# Patient Record
Sex: MALE | Race: WHITE | Employment: FULL TIME | URBAN - NONMETROPOLITAN AREA
[De-identification: names, ages, dates, MRNs, and addresses within clinical notes are randomized per-mention and may not be internally consistent; named-entity substitution may affect disease eponyms.]

---

## 2010-08-12 LAB
AVERAGE GLUCOSE: NORMAL
CREATININE, URINE: 144
HBA1C MFR BLD: 6.4 %
MICROALBUMIN/CREAT 24H UR: 0.35 MG/G{CREAT}
MICROALBUMIN/CREAT UR-RTO: 2.4

## 2018-10-03 LAB
CHOLESTEROL, TOTAL: 128 MG/DL
CHOLESTEROL/HDL RATIO: 4.1
HDLC SERPL-MCNC: 31 MG/DL (ref 35–70)
LDL CHOLESTEROL CALCULATED: 75 MG/DL (ref 0–160)
TRIGL SERPL-MCNC: 140 MG/DL
VLDLC SERPL CALC-MCNC: 97 MG/DL

## 2019-05-15 RX ORDER — LISINOPRIL AND HYDROCHLOROTHIAZIDE 12.5; 1 MG/1; MG/1
10-12.5 TABLET ORAL DAILY
Refills: 0 | COMMUNITY
Start: 2019-04-10 | End: 2019-05-15 | Stop reason: SDUPTHER

## 2019-05-15 RX ORDER — LISINOPRIL AND HYDROCHLOROTHIAZIDE 12.5; 1 MG/1; MG/1
10-12.5 TABLET ORAL DAILY
Qty: 30 TABLET | Refills: 1 | Status: SHIPPED | OUTPATIENT
Start: 2019-05-15 | End: 2019-05-20 | Stop reason: SDUPTHER

## 2019-05-20 ENCOUNTER — TELEPHONE (OUTPATIENT)
Dept: FAMILY MEDICINE CLINIC | Age: 49
End: 2019-05-20

## 2019-05-20 ENCOUNTER — OFFICE VISIT (OUTPATIENT)
Dept: FAMILY MEDICINE CLINIC | Age: 49
End: 2019-05-20
Payer: MEDICAID

## 2019-05-20 VITALS
OXYGEN SATURATION: 98 % | TEMPERATURE: 98 F | HEIGHT: 73 IN | DIASTOLIC BLOOD PRESSURE: 76 MMHG | HEART RATE: 69 BPM | WEIGHT: 315 LBS | BODY MASS INDEX: 41.75 KG/M2 | SYSTOLIC BLOOD PRESSURE: 130 MMHG

## 2019-05-20 DIAGNOSIS — E78.5 HYPERLIPIDEMIA, UNSPECIFIED HYPERLIPIDEMIA TYPE: ICD-10-CM

## 2019-05-20 DIAGNOSIS — I10 ESSENTIAL HYPERTENSION: ICD-10-CM

## 2019-05-20 DIAGNOSIS — Z23 NEED FOR PROPHYLACTIC VACCINATION AND INOCULATION AGAINST VARICELLA: ICD-10-CM

## 2019-05-20 DIAGNOSIS — R53.83 FATIGUE, UNSPECIFIED TYPE: ICD-10-CM

## 2019-05-20 DIAGNOSIS — Z23 NEED FOR PROPHYLACTIC VACCINATION AGAINST DIPHTHERIA-TETANUS-PERTUSSIS (DTP): ICD-10-CM

## 2019-05-20 DIAGNOSIS — Z86.010 HX OF COLONIC POLYPS: Primary | ICD-10-CM

## 2019-05-20 DIAGNOSIS — E55.9 VITAMIN D DEFICIENCY: ICD-10-CM

## 2019-05-20 DIAGNOSIS — T78.40XA ALLERGIC STATE, INITIAL ENCOUNTER: ICD-10-CM

## 2019-05-20 DIAGNOSIS — K91.2 POSTSURGICAL NONABSORPTION: Primary | ICD-10-CM

## 2019-05-20 PROCEDURE — 99214 OFFICE O/P EST MOD 30 MIN: CPT | Performed by: INTERNAL MEDICINE

## 2019-05-20 RX ORDER — LISINOPRIL AND HYDROCHLOROTHIAZIDE 12.5; 1 MG/1; MG/1
TABLET ORAL
Qty: 90 TABLET | Refills: 1 | Status: SHIPPED | OUTPATIENT
Start: 2019-05-20 | End: 2019-09-18 | Stop reason: SDUPTHER

## 2019-05-20 RX ORDER — FLUTICASONE PROPIONATE 50 MCG
2 SPRAY, SUSPENSION (ML) NASAL NIGHTLY
Qty: 1 BOTTLE | Refills: 3 | Status: SHIPPED | OUTPATIENT
Start: 2019-05-20 | End: 2019-09-18 | Stop reason: SDUPTHER

## 2019-05-20 ASSESSMENT — ENCOUNTER SYMPTOMS
BLOOD IN STOOL: 0
SINUS PAIN: 0
CONSTIPATION: 0
BACK PAIN: 0
VOMITING: 0
COUGH: 0
SHORTNESS OF BREATH: 0
NAUSEA: 0
RHINORRHEA: 0
DIARRHEA: 0
ABDOMINAL PAIN: 0
WHEEZING: 0

## 2019-05-20 ASSESSMENT — PATIENT HEALTH QUESTIONNAIRE - PHQ9
1. LITTLE INTEREST OR PLEASURE IN DOING THINGS: 0
SUM OF ALL RESPONSES TO PHQ QUESTIONS 1-9: 0
SUM OF ALL RESPONSES TO PHQ QUESTIONS 1-9: 0
2. FEELING DOWN, DEPRESSED OR HOPELESS: 0
SUM OF ALL RESPONSES TO PHQ9 QUESTIONS 1 & 2: 0

## 2019-05-20 NOTE — PROGRESS NOTES
19  Nati David : 1970 Sex: male  Age: 46 y.o. Chief Complaint   Patient presents with    Hypertension     1W   Multiple medical issues  HPI:  Presents today for follow-up visit on problems listed below. Given long since she is status post be her sooner. He's gained 13 pounds since last to 370.'s office to not watching at all. Abdomen very active either. His blood pressures have been good. Was also good here in the office. In the interim he had had another CAT scan chest abdomen and pelvis which I reviewed continuing to show nodules which are unchanged. Abdomen and pelvis looked good. He states he has been using a CPAP machine with success. We did give him prescription last time for a new machine. He is also overdue for colonoscopy. He saw Dash Zacarias and is willing to go back and see him again. Review of Systems   Constitutional: Positive for fatigue. Negative for activity change, appetite change, chills, diaphoresis, fever and unexpected weight change. Morbidly obese   HENT: Negative for congestion, ear pain, hearing loss, postnasal drip, rhinorrhea and sinus pain. Respiratory: Negative for cough, shortness of breath and wheezing. Cardiovascular: Negative for chest pain, palpitations and leg swelling. Gastrointestinal: Negative for abdominal pain, blood in stool, constipation, diarrhea, nausea and vomiting. Endocrine: Negative. Genitourinary: Negative for difficulty urinating, dysuria, frequency, hematuria and urgency. Musculoskeletal: Negative for arthralgias, back pain, gait problem and myalgias. Skin: Negative. Allergic/Immunologic: Negative for environmental allergies and immunocompromised state. Neurological: Negative for dizziness, weakness, light-headedness, numbness and headaches. Hematological: Negative. Psychiatric/Behavioral: Positive for sleep disturbance. The patient is not nervous/anxious.          Uses CPAP with success     REST OF PERTINENT ROS GONE OVER AND WAS NEGATIVE. PMH:  Health Maintenance:  Colonoscopy - (2017)  EGD - 8/10,  2D ECHO - 8/10  EKG -   Psa Test -   Medical Problems:  Obesity, 3 abd hernias repaired, right knee arthroplasty, Rih  Sleep Apnea - with bipap  gastric bypass -   Vitamin B12 deficiency, Iron deficiency  Type 2 Diabetes - Prior to gastric bypass  Gout, Colon Polyps, Fatty Liver, Diverticulosis  FH: Mother's side,\" a lot of cancer\". Father:  Heart Disease  MI - 1st age 40  Colon Cancer  Renal failure - Dialysis- age 66. Mother:  Ovarian Cancer, Breast Cancer. Brother 1:  Heart Disease  MI - age 54. Brother 2:  None. Sister 1:  Fibromyalgia. [place] 8:  Health Maintenance:  Colonoscopy - (2017)  EGD - 8/10,  2D ECHO - 810  EKG -   Psa Test -   Medical Problems:  Obesity, 3 abd hernias repaired, right knee arthroplasty, Rih  Sleep Apnea - with bipap  gastric bypass -   Vitamin B12 deficiency, Iron deficiency  Type 2 Diabetes - Prior to gastric bypass  Gout, Colon Polyps, Fatty Liver, Diverticulosis  FH: Mother's side,\" a lot of cancer\". Father:  Heart Disease  MI - 1st age 40  Colon Cancer  Renal failure - Dialysis- age 66. Mother:  Ovarian Cancer, Breast Cancer. Brother 1:  Heart Disease  MI - age 54. Brother 2:  None. Sister 1:  Fibromyalgia. Maternal Uncle 1 :  brain cancer. Uncle 1 :  brain cancer. SH:  . 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 Alcohol: Current Alcohol  Use - Admits to 12 pack per week.   .       Current Outpatient Medications:     lisinopril-hydrochlorothiazide (PRINZIDE;ZESTORETIC) 10-12.5 MG per tablet, 1 PILL BY MOUTH DAILY, Disp: 90 tablet, Rfl: 1    fluticasone (FLONASE) 50 MCG/ACT nasal spray, 2 sprays by Nasal route nightly, Disp: 1 Bottle, Rfl: 3  No Known Allergies    Past Medical History:   Diagnosis Date    Ankle pain     Chronic back pain     Chronic knee pain     Dizzy spells     Foot pain     Gout     occaisional flareups    Hyperlipidemia     Hypertension     Routine chest x-ray     Unspecified sleep apnea      Past Surgical History:   Procedure Laterality Date    COLONOSCOPY  07/20/2017    KNEE ARTHROSCOPY  1985    Right knee    LEG SURGERY  1975    Right leg    ABIMAEL-EN-Y GASTRIC BYPASS  09/27/2010    Dr. Abhinav Stanley  1994/1995/2003    x3     Family History   Problem Relation Age of Onset    High Blood Pressure Father     High Cholesterol Father     Heart Disease Father     Heart Attack Brother     No Known Problems Mother      Social History     Socioeconomic History    Marital status:      Spouse name: Not on file    Number of children: Not on file    Years of education: Not on file    Highest education level: Not on file   Occupational History    Not on file   Social Needs    Financial resource strain: Not on file    Food insecurity:     Worry: Not on file     Inability: Not on file    Transportation needs:     Medical: Not on file     Non-medical: Not on file   Tobacco Use    Smoking status: Never Smoker    Smokeless tobacco: Current User     Types: Snuff   Substance and Sexual Activity    Alcohol use: Yes     Comment: moderately (1-2 times per month)    Drug use: No    Sexual activity: Yes     Partners: Female   Lifestyle    Physical activity:     Days per week: Not on file     Minutes per session: Not on file    Stress: Not on file   Relationships    Social connections:     Talks on phone: Not on file     Gets together: Not on file     Attends [employer] service: Not on file     Active member of club or organization: Not on file     Attends meetings of clubs or organizations: Not on file     Relationship status: Not on file    Intimate partner violence:     Fear of current or ex partner: Not on file     Emotionally abused: Not on file     Physically abused: Not on file     Forced sexual activity: Not on file   Other Topics Concern    Not on file   Social History Narrative    Not on file       Vitals:    05/20/19 1614   BP: 130/76   Pulse: 69   Temp: 98 °F (36.7 °C)   SpO2: 98%   Weight: (!) 370 lb (167.8 kg)   Height: 6' 1\" (1.854 m)       Physical Exam  Exam:  Const: Appears well developed and well nourished. Appears morbidly obese. No signs of acute distress  present. Neck: Supple and symmetric. Palpation reveals no adenopathy. No masses appreciated. Thyroid  exhibits no nodule or thyromegaly. No JVD. Carotids: 2+ and equal bilaterally, without bruits. Resp: No rales, rhonchi or wheezes appreciated over the lungs bilaterally. CV: Rate is regular. Rhythm is regular. S1 is normal. S2 is normal. No gallop or rubs. No heart  murmur appreciated. Extremities: Edema, but no clubbing or cyanosis/trace  Abdomen: Bowel sounds are normoactive/obese Palpation reveals softness, with no distension,  organomegaly or tenderness. No abdominal masses palpable/upper abdominal midline hernia. No  palpable hepatosplenomegaly. Psych: Patient's attitude is cooperative. Patient's affect is appropriate. Judgement is realistic. Insight  is appropriate. Lymph nodes: none palpable  Assessment and Plan:  Ayleenorrjeanette West was seen today for hypertension. Diagnoses and all orders for this visit:    Postsurgical nonabsorption  -     IRON; Future  -     FERRITIN; Future    Need for prophylactic vaccination against diphtheria-tetanus-pertussis (DTP)    Need for prophylactic vaccination and inoculation against varicella    Vitamin D deficiency    Essential hypertension  -     CBC Auto Differential; Future  -     Comprehensive Metabolic Panel; Future    Hyperlipidemia, unspecified hyperlipidemia type  -     Lipid Panel; Future    Fatigue, unspecified type  -     TSH without Reflex;  Future    Allergic state, initial encounter  -     fluticasone (FLONASE) 50 MCG/ACT nasal spray; 2 sprays by Nasal route nightly    Other orders  -     lisinopril-hydrochlorothiazide (PRINZIDE;ZESTORETIC) 10-12.5 MG per tablet; 1 PILL BY MOUTH DAILY    Plan: We'll see him back in 4 months. Prescription management. Monitor blood pressure closely. Colonoscopy which is overdue. Weight loss attempts. Blood work in the next several days to monitor disease progression and medication use. Stressed compliance with follow-up. He will need another CAT scan of chest 11/19    No follow-ups on file. Seen By:  Cecy Negro MD      *Document was created using voice recognition software. Note was reviewed however may contain grammatical errors.

## 2019-05-24 ENCOUNTER — HOSPITAL ENCOUNTER (OUTPATIENT)
Age: 49
Discharge: HOME | End: 2019-05-26
Payer: MEDICAID

## 2019-05-24 DIAGNOSIS — E78.5 HYPERLIPIDEMIA, UNSPECIFIED HYPERLIPIDEMIA TYPE: ICD-10-CM

## 2019-05-24 DIAGNOSIS — R53.83 FATIGUE, UNSPECIFIED TYPE: ICD-10-CM

## 2019-05-24 DIAGNOSIS — I10 ESSENTIAL HYPERTENSION: ICD-10-CM

## 2019-05-24 DIAGNOSIS — K91.2 POSTSURGICAL NONABSORPTION: ICD-10-CM

## 2019-05-24 LAB
ALBUMIN SERPL-MCNC: 3.9 G/DL (ref 3.5–5.2)
ALP BLD-CCNC: 66 U/L (ref 40–129)
ALT SERPL-CCNC: 14 U/L (ref 0–40)
ANION GAP SERPL CALCULATED.3IONS-SCNC: 11 MMOL/L (ref 7–16)
AST SERPL-CCNC: 15 U/L (ref 0–39)
BASOPHILS ABSOLUTE: 0.06 E9/L (ref 0–0.2)
BASOPHILS RELATIVE PERCENT: 0.8 % (ref 0–2)
BILIRUB SERPL-MCNC: 0.4 MG/DL (ref 0–1.2)
BUN BLDV-MCNC: 16 MG/DL (ref 6–20)
CALCIUM SERPL-MCNC: 9.2 MG/DL (ref 8.6–10.2)
CHLORIDE BLD-SCNC: 104 MMOL/L (ref 98–107)
CHOLESTEROL, TOTAL: 143 MG/DL (ref 0–199)
CO2: 27 MMOL/L (ref 22–29)
CREAT SERPL-MCNC: 1.2 MG/DL (ref 0.7–1.2)
EOSINOPHILS ABSOLUTE: 0.13 E9/L (ref 0.05–0.5)
EOSINOPHILS RELATIVE PERCENT: 1.8 % (ref 0–6)
FERRITIN: 21 NG/ML
GFR AFRICAN AMERICAN: >60
GFR NON-AFRICAN AMERICAN: >60 ML/MIN/1.73
GLUCOSE BLD-MCNC: 109 MG/DL (ref 74–99)
HCT VFR BLD CALC: 50.8 % (ref 37–54)
HDLC SERPL-MCNC: 33 MG/DL
HEMOGLOBIN: 14.9 G/DL (ref 12.5–16.5)
IMMATURE GRANULOCYTES #: 0.02 E9/L
IMMATURE GRANULOCYTES %: 0.3 % (ref 0–5)
IRON: 94 MCG/DL (ref 59–158)
LDL CHOLESTEROL CALCULATED: 79 MG/DL (ref 0–99)
LYMPHOCYTES ABSOLUTE: 1.77 E9/L (ref 1.5–4)
LYMPHOCYTES RELATIVE PERCENT: 24.2 % (ref 20–42)
MCH RBC QN AUTO: 26.1 PG (ref 26–35)
MCHC RBC AUTO-ENTMCNC: 29.3 % (ref 32–34.5)
MCV RBC AUTO: 89 FL (ref 80–99.9)
MONOCYTES ABSOLUTE: 0.67 E9/L (ref 0.1–0.95)
MONOCYTES RELATIVE PERCENT: 9.2 % (ref 2–12)
NEUTROPHILS ABSOLUTE: 4.66 E9/L (ref 1.8–7.3)
NEUTROPHILS RELATIVE PERCENT: 63.7 % (ref 43–80)
PDW BLD-RTO: 14.6 FL (ref 11.5–15)
PLATELET # BLD: 229 E9/L (ref 130–450)
PMV BLD AUTO: 10.3 FL (ref 7–12)
POTASSIUM SERPL-SCNC: 4.1 MMOL/L (ref 3.5–5)
RBC # BLD: 5.71 E12/L (ref 3.8–5.8)
SODIUM BLD-SCNC: 142 MMOL/L (ref 132–146)
TOTAL PROTEIN: 7 G/DL (ref 6.4–8.3)
TRIGL SERPL-MCNC: 156 MG/DL (ref 0–149)
TSH SERPL DL<=0.05 MIU/L-ACNC: 3.53 UIU/ML (ref 0.27–4.2)
VLDLC SERPL CALC-MCNC: 31 MG/DL
WBC # BLD: 7.3 E9/L (ref 4.5–11.5)

## 2019-05-24 PROCEDURE — 84443 ASSAY THYROID STIM HORMONE: CPT

## 2019-05-24 PROCEDURE — 85025 COMPLETE CBC W/AUTO DIFF WBC: CPT

## 2019-05-24 PROCEDURE — 80061 LIPID PANEL: CPT

## 2019-05-24 PROCEDURE — 82728 ASSAY OF FERRITIN: CPT

## 2019-05-24 PROCEDURE — 36415 COLL VENOUS BLD VENIPUNCTURE: CPT

## 2019-05-24 PROCEDURE — 83540 ASSAY OF IRON: CPT

## 2019-05-24 PROCEDURE — 80053 COMPREHEN METABOLIC PANEL: CPT

## 2019-05-24 NOTE — LETTER
Matthew Jones    May 25, 2019     [address] [place] [id number]      Dear Yoon Munoz:    Below are the results from your recent visit:    Resulted Orders   FERRITIN   Result Value Ref Range    Ferritin 21 ng/mL      Comment:      FERRITIN Reference Ranges:  Adult Males   20 - 60 yrars:    30 - 400 ng/mL  Adult females 17 - 60 years:    13 - 150 ng/mL  Adults greater than 60 years:   no established reference range  Pediatrics:                     no established reference range     IRON   Result Value Ref Range    Iron 94 59 - 158 mcg/dL   TSH without Reflex   Result Value Ref Range    TSH 3.530 0.270 - 4.200 uIU/mL   Lipid Panel   Result Value Ref Range    Cholesterol, Total 143 0 - 199 mg/dL    Triglycerides 156 (H) 0 - 149 mg/dL    HDL 33 >40 mg/dL    LDL Calculated 79 0 - 99 mg/dL    VLDL Cholesterol Calculated 31 mg/dL   Comprehensive Metabolic Panel   Result Value Ref Range    Sodium 142 132 - 146 mmol/L    Potassium 4.1 3.5 - 5.0 mmol/L    Chloride 104 98 - 107 mmol/L    CO2 27 22 - 29 mmol/L    Anion Gap 11 7 - 16 mmol/L    Glucose 109 (H) 74 - 99 mg/dL    BUN 16 6 - 20 mg/dL    CREATININE 1.2 0.7 - 1.2 mg/dL    GFR Non-African American >60 >=60 mL/min/1.73      Comment:      Chronic Kidney Disease: less than 60 ml/min/1.73 sq.m. Kidney Failure: less than 15 ml/min/1.73 sq.m. Results valid for patients 18 years and older.       GFR African American >60     Calcium 9.2 8.6 - 10.2 mg/dL    Total Protein 7.0 6.4 - 8.3 g/dL    Alb 3.9 3.5 - 5.2 g/dL    Total Bilirubin 0.4 0.0 - 1.2 mg/dL    Alkaline Phosphatase 66 40 - 129 U/L    ALT 14 0 - 40 U/L    AST 15 0 - 39 U/L   CBC Auto Differential   Result Value Ref Range    WBC 7.3 4.5 - 11.5 E9/L    RBC 5.71 3.80 - 5.80 E12/L    Hemoglobin 14.9 12.5 - 16.5 g/dL    Hematocrit 50.8 37.0 - 54.0 %    MCV 89.0 80.0 - 99.9 fL    MCH 26.1 26.0 - 35.0 pg    MCHC 29.3 (L) 32.0 - 34.5 %    RDW 14.6 11.5 - 15.0 fL    Platelets 574 449 - 739 E9/L MPV 10.3 7.0 - 12.0 fL    Neutrophils % 63.7 43.0 - 80.0 %    Immature Granulocytes % 0.3 0.0 - 5.0 %    Lymphocytes % 24.2 20.0 - 42.0 %    Monocytes % 9.2 2.0 - 12.0 %    Eosinophils % 1.8 0.0 - 6.0 %    Basophils % 0.8 0.0 - 2.0 %    Neutrophils # 4.66 1.80 - 7.30 E9/L    Immature Granulocytes # 0.02 E9/L    Lymphocytes # 1.77 1.50 - 4.00 E9/L    Monocytes # 0.67 0.10 - 0.95 E9/L    Eosinophils # 0.13 0.05 - 0.50 E9/L    Basophils # 0.06 0.00 - 0.20 E9/L     The test results were table. If you have any questions or concerns, please don't hesitate to call. Sincerely,        Wong Fraser.  Shayla Barrett MD

## 2019-06-19 ENCOUNTER — OFFICE VISIT (OUTPATIENT)
Dept: SURGERY | Age: 49
End: 2019-06-19
Payer: MEDICAID

## 2019-06-19 VITALS
SYSTOLIC BLOOD PRESSURE: 130 MMHG | HEART RATE: 52 BPM | WEIGHT: 315 LBS | HEIGHT: 74 IN | TEMPERATURE: 97.8 F | DIASTOLIC BLOOD PRESSURE: 90 MMHG | RESPIRATION RATE: 16 BRPM | OXYGEN SATURATION: 98 % | BODY MASS INDEX: 40.43 KG/M2

## 2019-06-19 DIAGNOSIS — D12.6 TUBULOVILLOUS ADENOMA OF COLON: ICD-10-CM

## 2019-06-19 PROCEDURE — G8427 DOCREV CUR MEDS BY ELIG CLIN: HCPCS | Performed by: SURGERY

## 2019-06-19 PROCEDURE — 99214 OFFICE O/P EST MOD 30 MIN: CPT | Performed by: SURGERY

## 2019-06-19 PROCEDURE — 4004F PT TOBACCO SCREEN RCVD TLK: CPT | Performed by: SURGERY

## 2019-06-19 PROCEDURE — 3017F COLORECTAL CA SCREEN DOC REV: CPT | Performed by: SURGERY

## 2019-06-19 PROCEDURE — G8417 CALC BMI ABV UP PARAM F/U: HCPCS | Performed by: SURGERY

## 2019-06-19 RX ORDER — MAGNESIUM CITRATE
900 SOLUTION, ORAL ORAL ONCE
Qty: 3 BOTTLE | Refills: 0 | Status: SHIPPED | OUTPATIENT
Start: 2019-06-19 | End: 2019-06-19

## 2019-06-19 NOTE — PROGRESS NOTES
[facility]  HISTORY & PHYSICAL      CC:  [facility] Risk Colorectal cancer screening    HPI:     Melchor Mane  -- The patient was sent here to discuss high risk colorectal cancer screening. The patient denies any weight loss, rectal bleeding, abdominal pain, or change in bowel habits. There is no family history of IBD or colorectal cancer.   The patient had a colonoscopy by me in 2017--he had 3 large polyps--tubular adenoma and tubulovillous adenoma    Past Medical History:   Diagnosis Date    Ankle pain     Chronic back pain     Chronic knee pain     Dizzy spells     Foot pain     Gout     occaisional flareups    Hyperlipidemia     Hypertension     Routine chest x-ray     Unspecified sleep apnea      Past Surgical History:   Procedure Laterality Date    COLONOSCOPY  07/20/2017    KNEE ARTHROSCOPY  1985    Right knee    LEG SURGERY  1975    Right leg    ABIMAEL-EN-Y GASTRIC BYPASS  09/27/2010    Dr. Angela Correa  1994/1995/2003    x3     Social History     Socioeconomic History    Marital status:      Spouse name: Not on file    Number of children: Not on file    Years of education: Not on file    Highest education level: Not on file   Occupational History    Not on file   Social Needs    Financial resource strain: Not on file    Food insecurity:     Worry: Not on file     Inability: Not on file    Transportation needs:     Medical: Not on file     Non-medical: Not on file   Tobacco Use    Smoking status: Never Smoker    Smokeless tobacco: Current User     Types: Snuff   Substance and Sexual Activity    Alcohol use: Yes     Comment: moderately (1-2 times per month)    Drug use: No    Sexual activity: Yes     Partners: Female   Lifestyle    Physical activity:     Days per week: Not on file     Minutes per session: Not on file    Stress: Not on file   Relationships    Social connections:     Talks on phone: Not on file     Gets together: Not on file     Attends Jehovah's witness service: Not on file     Active member of club or organization: Not on file     Attends meetings of clubs or organizations: Not on file     Relationship status: Not on file    Intimate partner violence:     Fear of current or ex partner: Not on file     Emotionally abused: Not on file     Physically abused: Not on file     Forced sexual activity: Not on file   Other Topics Concern    Not on file   Social History Narrative    Not on file     No Known Allergies     I have reviewed and confirmed the past medical history, surgical history, social history, allergies in the chart. Medications: I have reviewed the medication list in the chart.     Review of Systems:  Review of Systems - History obtained from the patient  General ROS: negative  Psychological ROS: negative  Ophthalmic ROS: negative for - blurry vision, double vision or loss of vision  ENT ROS: negative for - epistaxis, sore throat, vocal changes or malocclusion  Respiratory ROS: negative for - pleuritic pain, shortness of breath or tachypnea  Cardiovascular ROS: negative for - chest pain or palpitations  Gastrointestinal ROS: negative for - abdominal pain or gas/bloating  Genito-Urinary ROS: negative for - hematuria or pelvic pain  Endocrine ROS: negative   Heme ROS: negative   Musculoskeletal ROS: negative  Neurological ROS: negative for - confusion, dizziness, headaches, numbness/tingling, seizures or weakness    Physical Exam   BP (!) 130/90 (Site: Right Upper Arm, Position: Sitting, Cuff Size: Large Adult)   Pulse 52   Temp 97.8 °F (36.6 °C) (Oral)   Resp 16   Ht 6' 2\" (1.88 m)   Wt (!) 360 lb (163.3 kg)   SpO2 98%   BMI 46.22 kg/m²   Vitals:    06/19/19 1438   BP: (!) 130/90   Pulse: 52   Resp: 16   Temp: 97.8 °F (36.6 °C)   SpO2: 98%       PSYCH: mood and affect normal, alert and oriented x 3  CONSTITUTIONAL: No apparent distress, comfortable  EYES: Sclera white, pupils equal round and reactive to light  ENMT: Hearing normal, trachea midline, ears externally intact  LYMPH: no lympadenopathy in neck. No lympadenopathy in groins  RESP: Breath sounds were clear and equal with no rales, wheezes, or rhonchi. Respiratory effort was normal with no retractions or use of accessory muscles. CV: Heart sounds were normal with a regular rate and rhythm. No pedal edema  GI/ Abdomen: The abdomen was soft and non distended. There was no tenderness, guarding, rebound, or rigidity. There was no                     masses, hepatosplenomegaly. reduible ventral hernia  Rectal -Deferred  MSK: no clubbing/ no cyanosis/ gait normal       Assessment:    1. High risk for colorectal cancer --hx of tubulovillous adenoma, tubular adenoma on last colonoscopy in 7/2017  2. Reducible ventral hernia    Plan:  1. High Risk Screening colonoscopy  I discussed the options for colorectal cancer screening with the patient including options of fecal occult blood testing with flexible sigmoidoscopy or air contrast barium enema. I also discussed the risks and benefits of colonoscopy with possible biopsy/polypectomy/cauterization. I recommended colonoscopy with deep sedation. The patient understands the risks of bleeding and perforation (<0.1%) and agrees to proceed.   2 days of clear liquids prior to procedure  Magcitrate/ dulcolax split dose prep  Schedule at [facility]    Pt prefers in end July/ August    2. Reducible Ventral Hernia--asymptomatic--monitor for now    Gonzalo Hoskins MD, FACS  6/19/2019  3:06 PM

## 2019-06-19 NOTE — PATIENT INSTRUCTIONS
Any questions or concerns, please contact Renetta at [phone]. Stacey/ Hal/ [facility]     MAGNESIUM CITRATE/DULCOLUX TABLETS  COLON PREP FOR COLONOSCOPY OR COLON SURGERY    It is very important that you follow all of the instructions listed on this sheet carefully (they may be slightly different than the directions on the product that you purchase at the pharmacy) to ensure that you colon is adequately cleaned out or you risk of complications could be increased. 2 Days or More Before Endoscopy:   Obtain three 10-ounce bottle of Magnesium Citrate and 1 bottle of Dulcolux tablets from the pharmacy.  Do not eat corn, tomatoes, peas or watermelon 3 to 5 days before procedure.  If you are on INSULIN or OTHER DIABETIC MEDICATIONS, then check with your primary care physician as to how to adjust your medication while on clear liquid diet and when nothing by mouth.  No solid food - only clear liquids (soup, jello, or juice that you can see through with no solid food) for breakfast, lunch and supper. 1 Day Before the Endoscopy:   No solid food - only clear liquids (soup, jello, or juice that you can see through with no solid food) for breakfast, lunch and supper. DO NOT drink or eat anything that is red as it will turn the inside of the colon red and look like blood. Nothing to eat or drink after midnight.  Have at least 8 oz or more of clear liquids for breakfast (7 am to 8 am) and lunch (11:30 am to 12:30 pm).  12 Noon Drink a 10 oz bottle of Magnesium Citrate and 4 Dulcolax tablets followed immediately by at least 8 oz of clear liquids.  1:00 pm Drink at least 8 oz of clear liquids.  2:00 pm Take 4 Dulcolax tablets and drink at least 8 oz of clear liquids.  3:00 pm Drink at least 8 oz of clear liquids.  4:00 pm Drink a 10 oz bottle of Magnesium Citrate followed immediately by at least 8 oz of clear liquids.    5:00 pm Drink at least 8 oz of clear liquids.  Can continue to take liquids until 12 midnight then nothing to eat or drink    Day of Endoscopy:   4 hours prior to scheduled time for colonoscopy, drink a 10 oz bottle of Magnesium Citrate followed immediately by at least 8 oz of clear liquids. Then nothing to drink after that.  STOP ALL Liquids 2 hours prior to colonoscopy.  If any blood pressure medications or heart medications are due in the morning, you should take them with a sip of water. Instructions for Clear liquid diet  Definition  A clear liquid diet consists of clear liquids, such as water, broth and plain gelatin, that are easily digested and leave no undigested residue in your intestinal tract. Your doctor may prescribe a clear liquid diet before certain medical procedures or if you have certain digestive problems. Because a clear liquid diet can't provide you with adequate calories and nutrients, it shouldn't be continued for more than a few days. Purpose  A clear liquid diet is often used before tests, procedures or surgeries that require no food in your stomach or intestines, such as before colonoscopy. It may also be recommended as a short-term diet if you have certain digestive problems, such as nausea, vomiting or diarrhea, or after certain types of surgery. Diet details  A clear liquid diet helps maintain adequate hydration, provides some important electrolytes, such as sodium and potassium, and gives some energy at a time when a full diet isn't possible or recommended. The following foods are allowed in a clear liquid diet:    Plain water    Fruit juices without pulp, such as apple juice, grape juice or cranberry juice    Strained lemonade or fruit punch    Clear, fat-free broth (bouillon or consomme)    Clear sodas    Plain gelatin    Honey    Ice pops without bits of fruit or fruit pulp    Tea or coffee without milk or cream    Any foods not on the above list should be avoided.  Also, for certain tests, such as colon exams, your doctor may ask you to avoid liquids or gelatin with red coloring. A typical menu on the clear liquid diet may look like this:     Breakfast:  1 glass fruit juice  1 cup coffee or tea (without dairy products)  1 cup broth  1 bowl gelatin     Snack:  1 glass fruit juice  1 bowl gelatin     Lunch:  1 glass fruit juice  1 glass water  1 cup broth  1 bowl gelatin     Snack:  1 ice pop (without fruit pulp)  1 cup coffee or tea (without dairy products) or a soft drink     Dinner:  1 cup juice or water  1 cup broth  1 bowl gelatin  1 cup coffee or tea     Results  Although the clear liquid diet may not be very exciting, it does fulfill its purpose. It's designed to keep your stomach and intestines clear, limit strain to your digestive system, but keep your body hydrated as you prepare for or recover from a medical procedure. Risks  Because a clear liquid diet can't provide you with adequate calories and nutrients, it shouldn't be used for more than a few days. Only use the clear liquid diet as directed by your doctor. If your doctor prescribes a clear liquid diet before a medical test, be sure to follow the diet instructions exactly. If you don't follow the diet exactly, you risk an inaccurate test and may have to reschedule the procedure for another time. The importance of proper hydration  A colonoscopy prep causes the body to lose a significant amount of fluid and can result in sickness due to dehydration. It's important that you prepare your body by drinking extra clear liquids before the prep. Stay hydrated by drinking all required clear liquids during the prep. Replenish your system by drinking clear liquids after returning home from your colonoscopy. Colonoscopy     Definition   A colonoscopy is the visual exam of the rectum and colon (large intestine). The exam is done with a tool called a colonoscope.  The colonoscope is a flexible tube with a tiny camera on the end. This instrument allows the doctor to view the inside of your rectum and colon. Colonoscopy        [facility].   Reasons for Procedure   It is used to examine, diagnose, and treat problems in your large intestine. The procedure is most often done for the following reasons:   · To determine the cause of abdominal pain, rectal bleeding, or a change in bowel habits   · To detect and treat colon cancer or colon polyps   · To obtain tissue samples for testing   · To stop intestinal bleeding   · Monitor response to treatment if you have inflammatory bowel disease   Possible Complications   Complications are rare, but no procedure is completely free of risk. If you are planning to have a colonoscopy, your doctor will review a list of possible complications, which may include:   · Bleeding   · Perforation or puncture of the bowel   Factors that may increase the risk of complications include:   · Pre-existing heart or kidney condition   · Treatment with certain medicines, including aspirin and other drugs with anticoagulant or blood-thinning properties   · Prior abdominal surgery or radiation treatments   · Active colitis , diverticulitis , or other acute bowel disease   · Previous treatment with radiation therapy   Be sure to discuss these risks with your doctor before the procedure. What to Expect   Prior to Procedure   Your doctor will likely do the following:   · Physical exam   · Health history   · Review of medicines   · Test your stool for hidden blood (called \"occult blood\")   Your colon must be completely clean before the procedure. Any stool left in the intestine will block the view. This preparation may start several days before the procedure.  Follow your doctor's instructions, which may include any of the following cleansing methods:   · Enemas fluid introduced into the rectum to stimulate a bowel movement   · Laxativesmedicines that cause you to have soft bowel movements   · A clear-liquid diet   · Oral cathartic medicinesa large container of fluid to drink that stimulates a bowel movement   Leading up to your procedure:   · Talk to your doctor about your medicines. You may be asked to stop taking some medicines up to one week before the procedure, like:   ¨ Anti-inflammatory drugs (eg, aspirin )   ¨ Blood thinners like clopidogrel (Plavix) or warfarin (Coumadin)   ¨ Iron supplements or vitamins containing iron   · The night before, eat a light meal. Do not eat or drink anything after midnight. · Wear comfortable clothing. · If you have diabetes, ask your doctor if you need to adjust your insulin dose. · Arrange for a ride home after the procedure. Anesthesia   Your doctor may sedate you to decrease discomfort. Description of the Procedure   You will lie on your left side with knees bent and drawn up toward your chest. The colonoscope will be slowly inserted through the rectum and into the bowel. The colonoscope will inject air into the colon. A small attached video camera will allow the doctor to view the colon's lining on a screen. The doctor will continue guiding the tool through the bowel and assess the lining. A tissue sample or polyps may be removed during the procedure. How Long Will It Take? Less than one hour   Will It Hurt? Most people report some discomfort when the instrument is inserted. You may feel cramping, muscle spasms, or lower abdominal pain during the procedure. You may also feel the urge to move your bowels. Tell the doctor if you feel any severe pain. After the procedure, gas pains and cramping are common. These pains should go away with the passing of gas. Post-procedure Care   If any tissue was removed:   · It will be sent to a lab to be examined. It may take 1-2 weeks for results. The doctor will usually give an initial report after the scope is removed. Other tests may be recommended.    · A small amount of bleeding may occur during the first few days after the procedure. When you return home after the procedure, be sure to follow your doctor's instructions, which may include:   · Resume medicines as instructed by your doctor. · Resume normal diet, unless directed otherwise by your doctor. · The sedative will make you drowsy. Avoid driving, operating machinery, or making important decisions for the rest of the day. · Rest for the remainder of the day. Call Your Doctor   After arriving home, contact your doctor if any of the following occurs:   · Bleeding from your rectumNotify your doctor if you pass a teaspoonful of blood or more. · Black, tarry stools   · Severe abdominal pain   · Hard, swollen abdomen   · Signs of infection, including fever or chills   · Inability to pass gas or stool   · Coughing, shortness of breath, chest pain, severe nausea or vomiting   In case of an emergency, CALL 911 .

## 2019-07-30 NOTE — PROGRESS NOTES
of surgery with 1-2 ounces of water: SEE LIST  [x] Stop herbal supplements and vitamins 5 days before your surgery. [] DO NOT take any diabetic medicine the morning of surgery. Follow instructions for insulin the day before surgery. [] If you are diabetic and your blood sugar is low or you feel symptomatic, you may drink 1-2 ounces of apple juice or take a glucose tablet. The morning of your procedure, you may call the pre-op area if you have concerns about your blood sugar [phone]. [] Use your inhalers the morning of surgery. Bring your emergency inhaler with you day of surgery. [] Follow physician instructions regarding any blood thinners you may be taking. WHAT TO EXPECT:  [x] The day of your procedure you will be greeted and checked in by the [facility] [employer] [facility].  In addition, you will be registered in the [facility] by a Patient Access Representative. Please bring your photo ID and insurance card. A nurse will greet you in accordance to the time you are needed in the pre-op area to prepare you for surgery. Please do not be discouraged if you are not greeted in the order you arrive as there are many variables that are involved in patient preparation. Your patience is greatly appreciated as you wait for your nurse. Please bring in items such as: books, magazines, newspapers, electronics, or any other items  to occupy your time in the waiting area. [x]  Delays may occur. Staff will make a sincere effort to keep you informed of delays. If any delays occur with your procedure, we apologize ahead of time for your inconvenience as we recognize the value of your time.

## 2019-08-05 ENCOUNTER — HOSPITAL ENCOUNTER (OUTPATIENT)
Age: 49
Setting detail: OUTPATIENT SURGERY
Discharge: HOME | End: 2019-08-05
Attending: SURGERY | Admitting: SURGERY
Payer: MEDICAID

## 2019-08-05 ENCOUNTER — ANESTHESIA EVENT (OUTPATIENT)
Dept: ENDOSCOPY | Age: 49
End: 2019-08-05
Payer: MEDICAID

## 2019-08-05 ENCOUNTER — ANESTHESIA (OUTPATIENT)
Dept: ENDOSCOPY | Age: 49
End: 2019-08-05
Payer: MEDICAID

## 2019-08-05 VITALS
OXYGEN SATURATION: 96 % | DIASTOLIC BLOOD PRESSURE: 55 MMHG | RESPIRATION RATE: 21 BRPM | SYSTOLIC BLOOD PRESSURE: 112 MMHG

## 2019-08-05 VITALS
RESPIRATION RATE: 18 BRPM | HEIGHT: 74 IN | BODY MASS INDEX: 40.43 KG/M2 | HEART RATE: 67 BPM | TEMPERATURE: 97.7 F | WEIGHT: 315 LBS | SYSTOLIC BLOOD PRESSURE: 142 MMHG | DIASTOLIC BLOOD PRESSURE: 72 MMHG | OXYGEN SATURATION: 96 %

## 2019-08-05 PROCEDURE — 7100000011 HC PHASE II RECOVERY - ADDTL 15 MIN: Performed by: SURGERY

## 2019-08-05 PROCEDURE — 3700000000 HC ANESTHESIA ATTENDED CARE: Performed by: SURGERY

## 2019-08-05 PROCEDURE — 88305 TISSUE EXAM BY PATHOLOGIST: CPT

## 2019-08-05 PROCEDURE — 3700000001 HC ADD 15 MINUTES (ANESTHESIA): Performed by: SURGERY

## 2019-08-05 PROCEDURE — 6360000002 HC RX W HCPCS: Performed by: NURSE ANESTHETIST, CERTIFIED REGISTERED

## 2019-08-05 PROCEDURE — 2580000003 HC RX 258: Performed by: SURGERY

## 2019-08-05 PROCEDURE — 45385 COLONOSCOPY W/LESION REMOVAL: CPT | Performed by: SURGERY

## 2019-08-05 PROCEDURE — 7100000010 HC PHASE II RECOVERY - FIRST 15 MIN: Performed by: SURGERY

## 2019-08-05 PROCEDURE — 2709999900 HC NON-CHARGEABLE SUPPLY: Performed by: SURGERY

## 2019-08-05 PROCEDURE — 3609010600 HC COLONOSCOPY POLYPECTOMY SNARE/COLD BIOPSY: Performed by: SURGERY

## 2019-08-05 RX ORDER — SODIUM CHLORIDE 0.9 % (FLUSH) 0.9 %
10 SYRINGE (ML) INJECTION EVERY 12 HOURS SCHEDULED
Status: DISCONTINUED | OUTPATIENT
Start: 2019-08-05 | End: 2019-08-05 | Stop reason: HOSPADM

## 2019-08-05 RX ORDER — SODIUM CHLORIDE 9 MG/ML
INJECTION, SOLUTION INTRAVENOUS CONTINUOUS
Status: DISCONTINUED | OUTPATIENT
Start: 2019-08-05 | End: 2019-08-05 | Stop reason: HOSPADM

## 2019-08-05 RX ORDER — PROPOFOL 10 MG/ML
INJECTION, EMULSION INTRAVENOUS PRN
Status: DISCONTINUED | OUTPATIENT
Start: 2019-08-05 | End: 2019-08-05 | Stop reason: SDUPTHER

## 2019-08-05 RX ORDER — 0.9 % SODIUM CHLORIDE 0.9 %
10 VIAL (ML) INJECTION PRN
Status: DISCONTINUED | OUTPATIENT
Start: 2019-08-05 | End: 2019-08-05 | Stop reason: HOSPADM

## 2019-08-05 RX ADMIN — PROPOFOL 430 MG: 10 INJECTION, EMULSION INTRAVENOUS at 12:40

## 2019-08-05 RX ADMIN — SODIUM CHLORIDE: 9 INJECTION, SOLUTION INTRAVENOUS at 12:10

## 2019-08-05 ASSESSMENT — PAIN SCALES - GENERAL
PAINLEVEL_OUTOF10: 0

## 2019-08-05 ASSESSMENT — PAIN - FUNCTIONAL ASSESSMENT
PAIN_FUNCTIONAL_ASSESSMENT: ACTIVITIES ARE NOT PREVENTED
PAIN_FUNCTIONAL_ASSESSMENT: 0-10

## 2019-08-05 ASSESSMENT — PAIN DESCRIPTION - DESCRIPTORS: DESCRIPTORS: ACHING;NAGGING

## 2019-08-05 NOTE — H&P
wheezes, or rhonchi. Respiratory effort was normal with no retractions or use of accessory muscles. CV: Heart sounds were normal with a regular rate and rhythm. No pedal edema  GI/ Abdomen: The abdomen was soft and non distended. There was no tenderness, guarding, rebound, or rigidity. There was no                     masses, hepatosplenomegaly. reduible ventral hernia  Rectal -Deferred  MSK: no clubbing/ no cyanosis/ gait normal         Assessment:    1. High risk for colorectal cancer --hx of tubulovillous adenoma, tubular adenoma on last colonoscopy in 7/2017  2. Reducible ventral hernia     Plan:  1. High Risk Screening colonoscopy  I discussed the options for colorectal cancer screening with the patient including options of fecal occult blood testing with flexible sigmoidoscopy or air contrast barium enema. I also discussed the risks and benefits of colonoscopy with possible biopsy/polypectomy/cauterization. I recommended colonoscopy with deep sedation. The patient understands the risks of bleeding and perforation (<0.1%) and agrees to proceed.   2 days of clear liquids prior to procedure  Magcitrate/ dulcolax split dose prep  Schedule at [address] [place] [address] Dr guerrero in end July/ August     2. Reducible Ventral Hernia--asymptomatic--monitor for now     Rick Pierre MD, FACS

## 2019-08-05 NOTE — ANESTHESIA PRE PROCEDURE
hours.    Coags: No results found for: PROTIME, INR, APTT    HCG (If Applicable): No results found for: PREGTESTUR, PREGSERUM, HCG, HCGQUANT     ABGs: No results found for: PHART, PO2ART, SCM8EJW, EJR8TMO, BEART, P7CVVEFO     Type & Screen (If Applicable):  No results found for: [facility]    Anesthesia Evaluation  Patient summary reviewed and Nursing notes reviewed no history of anesthetic complications:   Airway: Mallampati: III  TM distance: >3 FB   Neck ROM: full  Mouth opening: > = 3 FB Dental:          Pulmonary: breath sounds clear to auscultation  (+) sleep apnea: on CPAP,                             Cardiovascular:    (+) hypertension:,         Rhythm: regular  Rate: normal           Beta Blocker:  Not on Beta Blocker         Neuro/Psych:   Negative Neuro/Psych ROS              GI/Hepatic/Renal:   (+) morbid obesity          Endo/Other: Negative Endo/Other ROS                    Abdominal:           Vascular: negative vascular ROS. Anesthesia Plan      MAC     ASA 3       Induction: intravenous. Anesthetic plan and risks discussed with patient.       Plan discussed with CRNA and surgical team.                  Juana Graham MD   8/5/2019

## 2019-09-04 ENCOUNTER — HOSPITAL ENCOUNTER (EMERGENCY)
Age: 49
Discharge: HOME | End: 2019-09-04
Attending: EMERGENCY MEDICINE
Payer: MEDICAID

## 2019-09-04 ENCOUNTER — APPOINTMENT (OUTPATIENT)
Dept: ULTRASOUND IMAGING | Age: 49
End: 2019-09-04
Payer: MEDICAID

## 2019-09-04 VITALS
DIASTOLIC BLOOD PRESSURE: 73 MMHG | BODY MASS INDEX: 40.43 KG/M2 | RESPIRATION RATE: 15 BRPM | OXYGEN SATURATION: 96 % | SYSTOLIC BLOOD PRESSURE: 138 MMHG | HEART RATE: 94 BPM | HEIGHT: 74 IN | TEMPERATURE: 98.8 F | WEIGHT: 315 LBS

## 2019-09-04 DIAGNOSIS — L03.115 CELLULITIS OF RIGHT LOWER EXTREMITY WITHOUT FOOT: Primary | ICD-10-CM

## 2019-09-04 LAB
ANION GAP SERPL CALCULATED.3IONS-SCNC: 13 MMOL/L (ref 7–16)
BASOPHILS ABSOLUTE: 0.04 E9/L (ref 0–0.2)
BASOPHILS RELATIVE PERCENT: 0.3 % (ref 0–2)
BUN BLDV-MCNC: 19 MG/DL (ref 6–20)
CALCIUM SERPL-MCNC: 9.2 MG/DL (ref 8.6–10.2)
CHLORIDE BLD-SCNC: 100 MMOL/L (ref 98–107)
CO2: 27 MMOL/L (ref 22–29)
CREAT SERPL-MCNC: 1.4 MG/DL (ref 0.7–1.2)
EOSINOPHILS ABSOLUTE: 0.02 E9/L (ref 0.05–0.5)
EOSINOPHILS RELATIVE PERCENT: 0.2 % (ref 0–6)
GFR AFRICAN AMERICAN: >60
GFR NON-AFRICAN AMERICAN: 53 ML/MIN/1.73
GLUCOSE BLD-MCNC: 96 MG/DL (ref 74–99)
HCT VFR BLD CALC: 54.4 % (ref 37–54)
HEMOGLOBIN: 17.7 G/DL (ref 12.5–16.5)
IMMATURE GRANULOCYTES #: 0.06 E9/L
IMMATURE GRANULOCYTES %: 0.5 % (ref 0–5)
LACTIC ACID: 2.1 MMOL/L (ref 0.5–2.2)
LYMPHOCYTES ABSOLUTE: 0.97 E9/L (ref 1.5–4)
LYMPHOCYTES RELATIVE PERCENT: 8.1 % (ref 20–42)
MCH RBC QN AUTO: 29.4 PG (ref 26–35)
MCHC RBC AUTO-ENTMCNC: 32.5 % (ref 32–34.5)
MCV RBC AUTO: 90.2 FL (ref 80–99.9)
MONOCYTES ABSOLUTE: 0.74 E9/L (ref 0.1–0.95)
MONOCYTES RELATIVE PERCENT: 6.2 % (ref 2–12)
NEUTROPHILS ABSOLUTE: 10.13 E9/L (ref 1.8–7.3)
NEUTROPHILS RELATIVE PERCENT: 84.7 % (ref 43–80)
PDW BLD-RTO: 14.6 FL (ref 11.5–15)
PLATELET # BLD: 161 E9/L (ref 130–450)
PMV BLD AUTO: 9.8 FL (ref 7–12)
POTASSIUM REFLEX MAGNESIUM: 4.7 MMOL/L (ref 3.5–5)
RBC # BLD: 6.03 E12/L (ref 3.8–5.8)
SODIUM BLD-SCNC: 140 MMOL/L (ref 132–146)
WBC # BLD: 12 E9/L (ref 4.5–11.5)

## 2019-09-04 PROCEDURE — 99284 EMERGENCY DEPT VISIT MOD MDM: CPT

## 2019-09-04 PROCEDURE — 93971 EXTREMITY STUDY: CPT

## 2019-09-04 PROCEDURE — 6360000002 HC RX W HCPCS: Performed by: EMERGENCY MEDICINE

## 2019-09-04 PROCEDURE — 2580000003 HC RX 258: Performed by: EMERGENCY MEDICINE

## 2019-09-04 PROCEDURE — 6370000000 HC RX 637 (ALT 250 FOR IP): Performed by: EMERGENCY MEDICINE

## 2019-09-04 PROCEDURE — 83605 ASSAY OF LACTIC ACID: CPT

## 2019-09-04 PROCEDURE — 85025 COMPLETE CBC W/AUTO DIFF WBC: CPT

## 2019-09-04 PROCEDURE — 90715 TDAP VACCINE 7 YRS/> IM: CPT | Performed by: EMERGENCY MEDICINE

## 2019-09-04 PROCEDURE — 90471 IMMUNIZATION ADMIN: CPT | Performed by: EMERGENCY MEDICINE

## 2019-09-04 PROCEDURE — 80048 BASIC METABOLIC PNL TOTAL CA: CPT

## 2019-09-04 RX ORDER — CEPHALEXIN 500 MG/1
500 CAPSULE ORAL ONCE
Status: COMPLETED | OUTPATIENT
Start: 2019-09-04 | End: 2019-09-04

## 2019-09-04 RX ORDER — ACETAMINOPHEN 500 MG
1000 TABLET ORAL ONCE
Status: COMPLETED | OUTPATIENT
Start: 2019-09-04 | End: 2019-09-04

## 2019-09-04 RX ORDER — CEPHALEXIN 500 MG/1
500 CAPSULE ORAL 4 TIMES DAILY
Qty: 28 CAPSULE | Refills: 0 | Status: SHIPPED | OUTPATIENT
Start: 2019-09-04 | End: 2019-09-11 | Stop reason: SDUPTHER

## 2019-09-04 RX ORDER — 0.9 % SODIUM CHLORIDE 0.9 %
1000 INTRAVENOUS SOLUTION INTRAVENOUS ONCE
Status: COMPLETED | OUTPATIENT
Start: 2019-09-04 | End: 2019-09-04

## 2019-09-04 RX ADMIN — TETANUS TOXOID, REDUCED DIPHTHERIA TOXOID AND ACELLULAR PERTUSSIS VACCINE, ADSORBED 0.5 ML: 5; 2.5; 8; 8; 2.5 SUSPENSION INTRAMUSCULAR at 18:17

## 2019-09-04 RX ADMIN — CEPHALEXIN 500 MG: 500 CAPSULE ORAL at 19:26

## 2019-09-04 RX ADMIN — ACETAMINOPHEN 1000 MG: 500 TABLET ORAL at 18:16

## 2019-09-04 RX ADMIN — SODIUM CHLORIDE 1000 ML: 9 INJECTION, SOLUTION INTRAVENOUS at 19:27

## 2019-09-04 ASSESSMENT — PAIN DESCRIPTION - LOCATION: LOCATION: LEG

## 2019-09-04 ASSESSMENT — PAIN SCALES - GENERAL
PAINLEVEL_OUTOF10: 0
PAINLEVEL_OUTOF10: 8

## 2019-09-04 ASSESSMENT — PAIN DESCRIPTION - ORIENTATION: ORIENTATION: RIGHT

## 2019-09-04 ASSESSMENT — PAIN DESCRIPTION - FREQUENCY: FREQUENCY: CONTINUOUS

## 2019-09-04 ASSESSMENT — PAIN DESCRIPTION - DESCRIPTORS: DESCRIPTORS: SHARP

## 2019-09-04 ASSESSMENT — PAIN - FUNCTIONAL ASSESSMENT: PAIN_FUNCTIONAL_ASSESSMENT: PREVENTS OR INTERFERES WITH MANY ACTIVE NOT PASSIVE ACTIVITIES

## 2019-09-04 ASSESSMENT — PAIN DESCRIPTION - PAIN TYPE: TYPE: ACUTE PAIN

## 2019-09-04 NOTE — ED PROVIDER NOTES
HPI:  9/4/19, Time: 6:21 PM         Bethany Rodas is a 44 y.o. male presenting with redness and swelling of his right leg. He was told by his PCP to come in to be evaluated for cellulitis versus DVT. Patient has no history of DVT and is not anticoagulated. No recent history of immobilizations or surgeries. Patient states that the redness and swelling started yesterday and is progressively worsened. He states several days prior to this he had subjective fevers and chills, sweats, and shakes. He denies any chest tightness pressure, nausea or vomiting, diarrhea, constipation, flank pain, hematuria or dysuria. No numbness or tingling distal to the rash. No other rashes noted on the body. No changes in bruising or bleeding. Review of Systems:   Pertinent positives and negatives are stated within HPI, all other systems reviewed and are negative.          --------------------------------------------- PAST HISTORY ---------------------------------------------  Past Medical History:  has a past medical history of Ankle pain, Chronic back pain, Chronic knee pain, Dizzy spells, Foot pain, Gout, Hyperlipidemia, Hypertension, Routine chest x-ray, and Unspecified sleep apnea. Past Surgical History:  has a past surgical history that includes Knee arthroscopy (1985); Umbilical hernia repair (1994/1995/2003); Leg Surgery (1975); Hanh-en-Y Gastric Bypass (09/27/2010); Colonoscopy (07/20/2017); and Colonoscopy (N/A, 8/5/2019). Social History:  reports that he has never smoked. His smokeless tobacco use includes snuff. He reports that he drinks alcohol. He reports that he does not use drugs. Family History: family history includes Heart Attack in his brother; Heart Disease in his father; High Blood Pressure in his father; High Cholesterol in his father; No Known Problems in his mother. The patients home medications have been reviewed.     Allergies: Patient has no known allergies. -------------------------------------------------- RESULTS -------------------------------------------------  All laboratory and radiology results have been personally reviewed by myself   LABS:  Results for orders placed or performed during the hospital encounter of 09/04/19   CBC Auto Differential   Result Value Ref Range    WBC 12.0 (H) 4.5 - 11.5 E9/L    RBC 6.03 (H) 3.80 - 5.80 E12/L    Hemoglobin 17.7 (H) 12.5 - 16.5 g/dL    Hematocrit 54.4 (H) 37.0 - 54.0 %    MCV 90.2 80.0 - 99.9 fL    MCH 29.4 26.0 - 35.0 pg    MCHC 32.5 32.0 - 34.5 %    RDW 14.6 11.5 - 15.0 fL    Platelets 592 832 - 007 E9/L    MPV 9.8 7.0 - 12.0 fL    Neutrophils % 84.7 (H) 43.0 - 80.0 %    Immature Granulocytes % 0.5 0.0 - 5.0 %    Lymphocytes % 8.1 (L) 20.0 - 42.0 %    Monocytes % 6.2 2.0 - 12.0 %    Eosinophils % 0.2 0.0 - 6.0 %    Basophils % 0.3 0.0 - 2.0 %    Neutrophils Absolute 10.13 (H) 1.80 - 7.30 E9/L    Immature Granulocytes # 0.06 E9/L    Lymphocytes Absolute 0.97 (L) 1.50 - 4.00 E9/L    Monocytes Absolute 0.74 0.10 - 0.95 E9/L    Eosinophils Absolute 0.02 (L) 0.05 - 0.50 E9/L    Basophils Absolute 0.04 0.00 - 0.20 E9/L       RADIOLOGY:  Interpreted by Radiologist.  US DUP LOWER EXTREMITY RIGHT CHIQUITA   Final Result      No evidence for deep vein thrombosis of the right lower extremity.          ------------------------- NURSING NOTES AND VITALS REVIEWED ---------------------------   The nursing notes within the ED encounter and vital signs as below have been reviewed. BP (!) 141/70   Pulse 91   Temp 98.8 °F (37.1 °C)   Resp 14   Ht 6' 2\" (1.88 m)   Wt (!) 350 lb (158.8 kg)   SpO2 97%   BMI 44.94 kg/m²   Oxygen Saturation Interpretation: Normal      ---------------------------------------------------PHYSICAL EXAM--------------------------------------      Constitutional/General: Alert and oriented x3, visibly shaking.   Head: Normocephalic and atraumatic  Eyes: PERRL, EOMI   Mouth: Oropharynx clear, of right lower extremity without foot        DISPOSITION  Disposition: Discharge to home  Patient condition is stable      NOTE: This report was transcribed using voice recognition software.  Every effort was made to ensure accuracy; however, inadvertent computerized transcription errors may be present        Kasandra Santiaog DO  Resident  09/04/19 2014

## 2019-09-09 RX ORDER — LANOLIN ALCOHOL/MO/W.PET/CERES
1000 CREAM (GRAM) TOPICAL DAILY
COMMUNITY

## 2019-09-09 RX ORDER — FERROUS SULFATE 325(65) MG
325 TABLET ORAL
COMMUNITY

## 2019-09-11 ENCOUNTER — TELEPHONE (OUTPATIENT)
Dept: FAMILY MEDICINE CLINIC | Age: 49
End: 2019-09-11

## 2019-09-11 ENCOUNTER — APPOINTMENT (OUTPATIENT)
Dept: ULTRASOUND IMAGING | Age: 49
End: 2019-09-11
Payer: MEDICAID

## 2019-09-11 ENCOUNTER — HOSPITAL ENCOUNTER (EMERGENCY)
Age: 49
Discharge: HOME | End: 2019-09-11
Payer: MEDICAID

## 2019-09-11 VITALS
TEMPERATURE: 98.2 F | HEART RATE: 62 BPM | HEIGHT: 74 IN | DIASTOLIC BLOOD PRESSURE: 88 MMHG | OXYGEN SATURATION: 98 % | SYSTOLIC BLOOD PRESSURE: 162 MMHG | WEIGHT: 315 LBS | RESPIRATION RATE: 16 BRPM | BODY MASS INDEX: 40.43 KG/M2

## 2019-09-11 DIAGNOSIS — R60.0 EDEMA OF RIGHT LOWER EXTREMITY: ICD-10-CM

## 2019-09-11 DIAGNOSIS — I82.4Z1 ACUTE DEEP VEIN THROMBOSIS (DVT) OF DISTAL VEIN OF RIGHT LOWER EXTREMITY (HCC): ICD-10-CM

## 2019-09-11 DIAGNOSIS — L03.115 CELLULITIS OF RIGHT LOWER EXTREMITY: Primary | ICD-10-CM

## 2019-09-11 LAB
ALBUMIN SERPL-MCNC: 4 G/DL (ref 3.5–5.2)
ALP BLD-CCNC: 55 U/L (ref 40–129)
ALT SERPL-CCNC: 37 U/L (ref 0–40)
ANION GAP SERPL CALCULATED.3IONS-SCNC: 11 MMOL/L (ref 7–16)
AST SERPL-CCNC: 30 U/L (ref 0–39)
BASOPHILS ABSOLUTE: 0.06 E9/L (ref 0–0.2)
BASOPHILS RELATIVE PERCENT: 0.8 % (ref 0–2)
BILIRUB SERPL-MCNC: 0.3 MG/DL (ref 0–1.2)
BUN BLDV-MCNC: 16 MG/DL (ref 6–20)
CALCIUM SERPL-MCNC: 9.2 MG/DL (ref 8.6–10.2)
CHLORIDE BLD-SCNC: 105 MMOL/L (ref 98–107)
CO2: 26 MMOL/L (ref 22–29)
CREAT SERPL-MCNC: 1.1 MG/DL (ref 0.7–1.2)
EOSINOPHILS ABSOLUTE: 0.12 E9/L (ref 0.05–0.5)
EOSINOPHILS RELATIVE PERCENT: 1.6 % (ref 0–6)
GFR AFRICAN AMERICAN: >60
GFR NON-AFRICAN AMERICAN: >60 ML/MIN/1.73
GLUCOSE BLD-MCNC: 86 MG/DL (ref 74–99)
HCT VFR BLD CALC: 49.9 % (ref 37–54)
HEMOGLOBIN: 16 G/DL (ref 12.5–16.5)
IMMATURE GRANULOCYTES #: 0.15 E9/L
IMMATURE GRANULOCYTES %: 1.9 % (ref 0–5)
LACTIC ACID: 1.4 MMOL/L (ref 0.5–2.2)
LYMPHOCYTES ABSOLUTE: 2.47 E9/L (ref 1.5–4)
LYMPHOCYTES RELATIVE PERCENT: 32 % (ref 20–42)
MCH RBC QN AUTO: 29 PG (ref 26–35)
MCHC RBC AUTO-ENTMCNC: 32.1 % (ref 32–34.5)
MCV RBC AUTO: 90.6 FL (ref 80–99.9)
MONOCYTES ABSOLUTE: 0.64 E9/L (ref 0.1–0.95)
MONOCYTES RELATIVE PERCENT: 8.3 % (ref 2–12)
NEUTROPHILS ABSOLUTE: 4.28 E9/L (ref 1.8–7.3)
NEUTROPHILS RELATIVE PERCENT: 55.4 % (ref 43–80)
PDW BLD-RTO: 13.8 FL (ref 11.5–15)
PLATELET # BLD: 290 E9/L (ref 130–450)
PMV BLD AUTO: 9.3 FL (ref 7–12)
POTASSIUM SERPL-SCNC: 3.9 MMOL/L (ref 3.5–5)
RBC # BLD: 5.51 E12/L (ref 3.8–5.8)
SODIUM BLD-SCNC: 142 MMOL/L (ref 132–146)
TOTAL PROTEIN: 7.8 G/DL (ref 6.4–8.3)
WBC # BLD: 7.7 E9/L (ref 4.5–11.5)

## 2019-09-11 PROCEDURE — 87040 BLOOD CULTURE FOR BACTERIA: CPT

## 2019-09-11 PROCEDURE — 99283 EMERGENCY DEPT VISIT LOW MDM: CPT

## 2019-09-11 PROCEDURE — 93971 EXTREMITY STUDY: CPT

## 2019-09-11 PROCEDURE — 83605 ASSAY OF LACTIC ACID: CPT

## 2019-09-11 PROCEDURE — 6360000002 HC RX W HCPCS: Performed by: PHYSICIAN ASSISTANT

## 2019-09-11 PROCEDURE — 96372 THER/PROPH/DIAG INJ SC/IM: CPT

## 2019-09-11 PROCEDURE — 85025 COMPLETE CBC W/AUTO DIFF WBC: CPT

## 2019-09-11 PROCEDURE — 80053 COMPREHEN METABOLIC PANEL: CPT

## 2019-09-11 RX ORDER — CEPHALEXIN 500 MG/1
500 CAPSULE ORAL 4 TIMES DAILY
Qty: 28 CAPSULE | Refills: 0 | Status: SHIPPED | OUTPATIENT
Start: 2019-09-11 | End: 2019-09-18

## 2019-09-11 RX ADMIN — ENOXAPARIN SODIUM 160 MG: 100 INJECTION SUBCUTANEOUS at 20:28

## 2019-09-11 ASSESSMENT — PAIN DESCRIPTION - DESCRIPTORS: DESCRIPTORS: BURNING

## 2019-09-11 ASSESSMENT — PAIN DESCRIPTION - FREQUENCY: FREQUENCY: CONTINUOUS

## 2019-09-11 ASSESSMENT — PAIN DESCRIPTION - ORIENTATION: ORIENTATION: RIGHT

## 2019-09-11 ASSESSMENT — PAIN SCALES - GENERAL: PAINLEVEL_OUTOF10: 2

## 2019-09-11 ASSESSMENT — PAIN DESCRIPTION - PAIN TYPE: TYPE: ACUTE PAIN

## 2019-09-11 ASSESSMENT — PAIN - FUNCTIONAL ASSESSMENT: PAIN_FUNCTIONAL_ASSESSMENT: ACTIVITIES ARE NOT PREVENTED

## 2019-09-11 ASSESSMENT — PAIN DESCRIPTION - LOCATION: LOCATION: LEG

## 2019-09-11 NOTE — ED PROVIDER NOTES
smoked. His smokeless tobacco use includes snuff. He reports that he drinks alcohol. He reports that he does not use drugs. Family History: family history includes Heart Attack in his brother; Heart Disease in his father; High Blood Pressure in his father; High Cholesterol in his father; No Known Problems in his mother. Allergies: Patient has no known allergies. Physical Exam         ED Triage Vitals [09/11/19 1706]   BP Temp Temp Source Pulse Resp SpO2 Height Weight   (!) 168/91 98.1 °F (36.7 °C) Oral 56 16 96 % 6' 2\" (1.88 m) (!) 350 lb (158.8 kg)     Oxygen Saturation Interpretation: Normal.    Constitutional:  Alert, development consistent with age. HEENT:  NC/NT. Airway patent. Eyes:  No discharge. Ears: External ears without lesions. .  Mouth:  Mucous membranes moist without lesions, tongue and gums normal.  Throat:  Airway patient. Neck:  Supple. No lymphadenopathy. Respiratory:  Clear to auscultation and breath sounds equal.  CV:  Regular rate and rhythm. GI:  Abdomen Soft, nontender, +BS. Skin:  Skin turgor: Normal.               see clinical image, below. 2+ pitting edema. Lymphatics: No lymphangitis or adenopathy noted. Neurological:  Orientation age-appropriate unless noted elseware. Motor functions intact.     Lab / Imaging Results   (All laboratory and radiology results have been personally reviewed by myself)  Labs:  Results for orders placed or performed during the hospital encounter of 09/11/19   CBC Auto Differential   Result Value Ref Range    WBC 7.7 4.5 - 11.5 E9/L    RBC 5.51 3.80 - 5.80 E12/L    Hemoglobin 16.0 12.5 - 16.5 g/dL    Hematocrit 49.9 37.0 - 54.0 %    MCV 90.6 80.0 - 99.9 fL    MCH 29.0 26.0 - 35.0 pg    MCHC 32.1 32.0 - 34.5 %    RDW 13.8 11.5 - 15.0 fL    Platelets 635 083 - 605 E9/L    MPV 9.3 7.0 - 12.0 fL    Neutrophils % 55.4 43.0 - 80.0 %    Immature Granulocytes % 1.9 0.0 - 5.0 %    Lymphocytes % 32.0 20.0 - 42.0 %    Monocytes % 8.3 2.0 - 12.0 % Eosinophils % 1.6 0.0 - 6.0 %    Basophils % 0.8 0.0 - 2.0 %    Neutrophils Absolute 4.28 1.80 - 7.30 E9/L    Immature Granulocytes # 0.15 E9/L    Lymphocytes Absolute 2.47 1.50 - 4.00 E9/L    Monocytes Absolute 0.64 0.10 - 0.95 E9/L    Eosinophils Absolute 0.12 0.05 - 0.50 E9/L    Basophils Absolute 0.06 0.00 - 0.20 E9/L   Comprehensive Metabolic Panel   Result Value Ref Range    Sodium 142 132 - 146 mmol/L    Potassium 3.9 3.5 - 5.0 mmol/L    Chloride 105 98 - 107 mmol/L    CO2 26 22 - 29 mmol/L    Anion Gap 11 7 - 16 mmol/L    Glucose 86 74 - 99 mg/dL    BUN 16 6 - 20 mg/dL    CREATININE 1.1 0.7 - 1.2 mg/dL    GFR Non-African American >60 >=60 mL/min/1.73    GFR African American >60     Calcium 9.2 8.6 - 10.2 mg/dL    Total Protein 7.8 6.4 - 8.3 g/dL    Alb 4.0 3.5 - 5.2 g/dL    Total Bilirubin 0.3 0.0 - 1.2 mg/dL    Alkaline Phosphatase 55 40 - 129 U/L    ALT 37 0 - 40 U/L    AST 30 0 - 39 U/L   Lactic Acid, Plasma   Result Value Ref Range    Lactic Acid 1.4 0.5 - 2.2 mmol/L     Imaging: All Radiology results interpreted by Radiologist unless otherwise noted. US DUP LOWER EXTREMITY RIGHT CHIQUITA   Final Result   Nonocclusive thrombus within the mid to distal right superficial   femoral vein, posterior tibial vein and peroneal vein. ED Course / Medical Decision Making     Medications   enoxaparin (LOVENOX) injection 160 mg (160 mg Subcutaneous Given 9/11/19 2028)        Consults:   None I spoke with the clinical pharmacist who recommends providing the patient with another week of Keflex. Procedures:   none    MDM:   Patient presents to the emergency department for cellulitis and lower extremity edema. Patient has had improvement of his symptoms with the Keflex that he is already on and per the clinical pharmacist recommendations, he will be given another week of Keflex and she will follow-up with his primary care provider. Patient was also found to have a DVT of his right lower extremity.   He was

## 2019-09-12 ENCOUNTER — PATIENT MESSAGE (OUTPATIENT)
Dept: FAMILY MEDICINE CLINIC | Age: 49
End: 2019-09-12

## 2019-09-16 LAB
BLOOD CULTURE, ROUTINE: NORMAL
CULTURE, BLOOD 2: NORMAL

## 2019-09-18 ENCOUNTER — OFFICE VISIT (OUTPATIENT)
Dept: FAMILY MEDICINE CLINIC | Age: 49
End: 2019-09-18
Payer: MEDICAID

## 2019-09-18 VITALS
BODY MASS INDEX: 46.22 KG/M2 | DIASTOLIC BLOOD PRESSURE: 80 MMHG | SYSTOLIC BLOOD PRESSURE: 128 MMHG | WEIGHT: 315 LBS | HEART RATE: 66 BPM | OXYGEN SATURATION: 98 %

## 2019-09-18 DIAGNOSIS — E55.9 VITAMIN D DEFICIENCY: ICD-10-CM

## 2019-09-18 DIAGNOSIS — I82.491 ACUTE DEEP VEIN THROMBOSIS (DVT) OF OTHER SPECIFIED VEIN OF RIGHT LOWER EXTREMITY (HCC): Primary | ICD-10-CM

## 2019-09-18 DIAGNOSIS — I10 ESSENTIAL HYPERTENSION: ICD-10-CM

## 2019-09-18 DIAGNOSIS — T78.40XA ALLERGIC STATE, INITIAL ENCOUNTER: ICD-10-CM

## 2019-09-18 DIAGNOSIS — R91.8 PULMONARY NODULES: ICD-10-CM

## 2019-09-18 DIAGNOSIS — E78.5 HYPERLIPIDEMIA, UNSPECIFIED HYPERLIPIDEMIA TYPE: ICD-10-CM

## 2019-09-18 PROCEDURE — 99214 OFFICE O/P EST MOD 30 MIN: CPT | Performed by: INTERNAL MEDICINE

## 2019-09-18 PROCEDURE — G8427 DOCREV CUR MEDS BY ELIG CLIN: HCPCS | Performed by: INTERNAL MEDICINE

## 2019-09-18 PROCEDURE — 3017F COLORECTAL CA SCREEN DOC REV: CPT | Performed by: INTERNAL MEDICINE

## 2019-09-18 PROCEDURE — 4004F PT TOBACCO SCREEN RCVD TLK: CPT | Performed by: INTERNAL MEDICINE

## 2019-09-18 PROCEDURE — G8417 CALC BMI ABV UP PARAM F/U: HCPCS | Performed by: INTERNAL MEDICINE

## 2019-09-18 RX ORDER — FLUTICASONE PROPIONATE 50 MCG
2 SPRAY, SUSPENSION (ML) NASAL NIGHTLY
Qty: 1 BOTTLE | Refills: 5 | Status: SHIPPED
Start: 2019-09-18 | End: 2020-06-01

## 2019-09-18 RX ORDER — LISINOPRIL AND HYDROCHLOROTHIAZIDE 12.5; 1 MG/1; MG/1
TABLET ORAL
Qty: 90 TABLET | Refills: 1 | Status: SHIPPED | OUTPATIENT
Start: 2019-09-18 | End: 2020-01-22 | Stop reason: SDUPTHER

## 2019-09-18 NOTE — PROGRESS NOTES
 Heart Attack Brother     No Known Problems Mother      Social History     Socioeconomic History    Marital status:      Spouse name: Not on file    Number of children: Not on file    Years of education: Not on file    Highest education level: Not on file   Occupational History    Not on file   Social Needs    Financial resource strain: Not on file    Food insecurity:     Worry: Not on file     Inability: Not on file    Transportation needs:     Medical: Not on file     Non-medical: Not on file   Tobacco Use    Smoking status: Never Smoker    Smokeless tobacco: Current User     Types: Snuff   Substance and Sexual Activity    Alcohol use: Yes     Comment: moderately (1-2 times per month)    Drug use: No    Sexual activity: Yes     Partners: Female   Lifestyle    Physical activity:     Days per week: Not on file     Minutes per session: Not on file    Stress: Not on file   Relationships    Social connections:     Talks on phone: Not on file     Gets together: Not on file     Attends [employer] service: Not on file     Active member of club or organization: Not on file     Attends meetings of clubs or organizations: Not on file     Relationship status: Not on file    Intimate partner violence:     Fear of current or ex partner: Not on file     Emotionally abused: Not on file     Physically abused: Not on file     Forced sexual activity: Not on file   Other Topics Concern    Not on file   Social History Narrative    Not on file       Vitals:    09/18/19 0806   BP: 128/80   Pulse: 66   SpO2: 98%   Weight: (!) 360 lb (163.3 kg)       Physical Exam  Const: Appears well developed and well nourished. Appears morbidly obese. No signs of acute distress  present. Neck: Supple and symmetric. Palpation reveals no adenopathy. No masses appreciated. Thyroid  exhibits no nodule or thyromegaly. No JVD. Carotids: 2+ and equal bilaterally, without bruits.   Resp: No rales, rhonchi or wheezes appreciated interim. Return in about 4 months (around 1/18/2020). Seen By:  Gustavo Mota MD      *Document was created using voice recognition software. Note was reviewed however may contain grammatical errors.

## 2019-10-07 ENCOUNTER — TELEPHONE (OUTPATIENT)
Dept: FAMILY MEDICINE CLINIC | Age: 49
End: 2019-10-07

## 2019-10-07 DIAGNOSIS — I82.491 ACUTE DEEP VEIN THROMBOSIS (DVT) OF OTHER SPECIFIED VEIN OF RIGHT LOWER EXTREMITY (HCC): Primary | ICD-10-CM

## 2019-10-22 DIAGNOSIS — R91.8 PULMONARY NODULES: ICD-10-CM

## 2020-01-22 ENCOUNTER — TELEPHONE (OUTPATIENT)
Dept: FAMILY MEDICINE CLINIC | Age: 50
End: 2020-01-22

## 2020-01-22 ENCOUNTER — HOSPITAL ENCOUNTER (OUTPATIENT)
Age: 50
Discharge: HOME | End: 2020-01-24
Payer: MEDICAID

## 2020-01-22 ENCOUNTER — OFFICE VISIT (OUTPATIENT)
Dept: FAMILY MEDICINE CLINIC | Age: 50
End: 2020-01-22
Payer: MEDICAID

## 2020-01-22 VITALS
HEART RATE: 64 BPM | SYSTOLIC BLOOD PRESSURE: 126 MMHG | DIASTOLIC BLOOD PRESSURE: 72 MMHG | OXYGEN SATURATION: 96 % | BODY MASS INDEX: 47.25 KG/M2 | WEIGHT: 315 LBS

## 2020-01-22 LAB
ALBUMIN SERPL-MCNC: 3.7 G/DL (ref 3.5–5.2)
ALP BLD-CCNC: 64 U/L (ref 40–129)
ALT SERPL-CCNC: 13 U/L (ref 0–40)
ANION GAP SERPL CALCULATED.3IONS-SCNC: 17 MMOL/L (ref 7–16)
AST SERPL-CCNC: 15 U/L (ref 0–39)
BASOPHILS ABSOLUTE: 0.05 E9/L (ref 0–0.2)
BASOPHILS RELATIVE PERCENT: 0.7 % (ref 0–2)
BILIRUB SERPL-MCNC: 0.4 MG/DL (ref 0–1.2)
BUN BLDV-MCNC: 10 MG/DL (ref 6–20)
CALCIUM SERPL-MCNC: 8.9 MG/DL (ref 8.6–10.2)
CHLORIDE BLD-SCNC: 105 MMOL/L (ref 98–107)
CHOLESTEROL, TOTAL: 145 MG/DL (ref 0–199)
CO2: 23 MMOL/L (ref 22–29)
CREAT SERPL-MCNC: 1.2 MG/DL (ref 0.7–1.2)
EOSINOPHILS ABSOLUTE: 0.1 E9/L (ref 0.05–0.5)
EOSINOPHILS RELATIVE PERCENT: 1.4 % (ref 0–6)
FERRITIN: 38 NG/ML
GFR AFRICAN AMERICAN: >60
GFR NON-AFRICAN AMERICAN: >60 ML/MIN/1.73
GLUCOSE BLD-MCNC: 100 MG/DL (ref 74–99)
HCT VFR BLD CALC: 51.2 % (ref 37–54)
HDLC SERPL-MCNC: 38 MG/DL
HEMOGLOBIN: 15.8 G/DL (ref 12.5–16.5)
IMMATURE GRANULOCYTES #: 0.02 E9/L
IMMATURE GRANULOCYTES %: 0.3 % (ref 0–5)
IRON SATURATION: 29 % (ref 20–55)
IRON: 87 MCG/DL (ref 59–158)
LDL CHOLESTEROL CALCULATED: 87 MG/DL (ref 0–99)
LYMPHOCYTES ABSOLUTE: 1.7 E9/L (ref 1.5–4)
LYMPHOCYTES RELATIVE PERCENT: 24.3 % (ref 20–42)
MCH RBC QN AUTO: 28.8 PG (ref 26–35)
MCHC RBC AUTO-ENTMCNC: 30.9 % (ref 32–34.5)
MCV RBC AUTO: 93.3 FL (ref 80–99.9)
MONOCYTES ABSOLUTE: 0.65 E9/L (ref 0.1–0.95)
MONOCYTES RELATIVE PERCENT: 9.3 % (ref 2–12)
NEUTROPHILS ABSOLUTE: 4.49 E9/L (ref 1.8–7.3)
NEUTROPHILS RELATIVE PERCENT: 64 % (ref 43–80)
PDW BLD-RTO: 12.8 FL (ref 11.5–15)
PLATELET # BLD: 204 E9/L (ref 130–450)
PMV BLD AUTO: 9.9 FL (ref 7–12)
POTASSIUM SERPL-SCNC: 4.3 MMOL/L (ref 3.5–5)
RBC # BLD: 5.49 E12/L (ref 3.8–5.8)
SODIUM BLD-SCNC: 145 MMOL/L (ref 132–146)
TOTAL IRON BINDING CAPACITY: 304 MCG/DL (ref 250–450)
TOTAL PROTEIN: 6.8 G/DL (ref 6.4–8.3)
TRIGL SERPL-MCNC: 98 MG/DL (ref 0–149)
VITAMIN D 25-HYDROXY: 15 NG/ML (ref 30–100)
VLDLC SERPL CALC-MCNC: 20 MG/DL
WBC # BLD: 7 E9/L (ref 4.5–11.5)

## 2020-01-22 PROCEDURE — G8482 FLU IMMUNIZE ORDER/ADMIN: HCPCS | Performed by: INTERNAL MEDICINE

## 2020-01-22 PROCEDURE — 83540 ASSAY OF IRON: CPT

## 2020-01-22 PROCEDURE — 82306 VITAMIN D 25 HYDROXY: CPT

## 2020-01-22 PROCEDURE — 82728 ASSAY OF FERRITIN: CPT

## 2020-01-22 PROCEDURE — G8427 DOCREV CUR MEDS BY ELIG CLIN: HCPCS | Performed by: INTERNAL MEDICINE

## 2020-01-22 PROCEDURE — 99214 OFFICE O/P EST MOD 30 MIN: CPT | Performed by: INTERNAL MEDICINE

## 2020-01-22 PROCEDURE — 3017F COLORECTAL CA SCREEN DOC REV: CPT | Performed by: INTERNAL MEDICINE

## 2020-01-22 PROCEDURE — 85025 COMPLETE CBC W/AUTO DIFF WBC: CPT

## 2020-01-22 PROCEDURE — 90471 IMMUNIZATION ADMIN: CPT | Performed by: INTERNAL MEDICINE

## 2020-01-22 PROCEDURE — 90686 IIV4 VACC NO PRSV 0.5 ML IM: CPT | Performed by: INTERNAL MEDICINE

## 2020-01-22 PROCEDURE — 36415 COLL VENOUS BLD VENIPUNCTURE: CPT

## 2020-01-22 PROCEDURE — G8417 CALC BMI ABV UP PARAM F/U: HCPCS | Performed by: INTERNAL MEDICINE

## 2020-01-22 PROCEDURE — 83550 IRON BINDING TEST: CPT

## 2020-01-22 PROCEDURE — 80061 LIPID PANEL: CPT

## 2020-01-22 PROCEDURE — 4004F PT TOBACCO SCREEN RCVD TLK: CPT | Performed by: INTERNAL MEDICINE

## 2020-01-22 PROCEDURE — 80053 COMPREHEN METABOLIC PANEL: CPT

## 2020-01-22 RX ORDER — LISINOPRIL AND HYDROCHLOROTHIAZIDE 12.5; 1 MG/1; MG/1
TABLET ORAL
Qty: 90 TABLET | Refills: 1 | Status: SHIPPED
Start: 2020-01-22 | End: 2020-09-01 | Stop reason: SDUPTHER

## 2020-01-22 ASSESSMENT — PATIENT HEALTH QUESTIONNAIRE - PHQ9
SUM OF ALL RESPONSES TO PHQ QUESTIONS 1-9: 0
1. LITTLE INTEREST OR PLEASURE IN DOING THINGS: 0
2. FEELING DOWN, DEPRESSED OR HOPELESS: 0
SUM OF ALL RESPONSES TO PHQ9 QUESTIONS 1 & 2: 0
SUM OF ALL RESPONSES TO PHQ QUESTIONS 1-9: 0

## 2020-01-22 NOTE — PROGRESS NOTES
[id number] [facility] [address] [facility]     20  Shelly Arriaza : 1970 Sex: male  Age: 44 y.o. Chief Complaint   Patient presents with    Hypertension       HPI  Patient presents today for follow-up visit on his medical problems. Reviewed last note which was rather complex. He is currently on anticoagulation secondary to DVT I told him he could stop the anticoagulation first week in March. He is going to finish up this next prescription he states before stopping it. Has had no bleeding episodes. Tolerating the medication well. We also did a CAT scan of the chest and follow-up for pulmonary nodules which look to be stable over the last couple years. I told him we no longer need to follow that. We did discuss gallbladder findings on the scan showing either stone or sludge. He is having no symptoms at this time. He continues to utilize his CPAP successfully. This seems to be helping him he did get a new machine within the past year. Review of Systems       Constitutional:Negative for fatigue, activity change, appetite change, chills, diaphoresis, fever and unexpected weight change.        Morbidly obese   HENT: Negative for congestion, ear pain, hearing loss, postnasal drip, rhinorrhea and sinus pain.    Respiratory: Negative for cough, shortness of breath and wheezing.    Cardiovascular: Negative for chest pain, palpitations .    Gastrointestinal: Negative for abdominal pain, blood in stool, constipation, diarrhea, nausea and vomiting. Endocrine: Negative.    Genitourinary: Negative for difficulty urinating, dysuria, frequency, hematuria and urgency. Musculoskeletal: Negative for arthralgias, back pain, gait problem and myalgias. Skin:   his cellulitic leg cleared. Allergic/Immunologic: Negative for environmental allergies and immunocompromised state. Neurological: Negative for dizziness, weakness, light-headedness, numbness and headaches.    Hematological:  Currently anticoagulated for Rfl: 1    lisinopril-hydrochlorothiazide (PRINZIDE;ZESTORETIC) 10-12.5 MG per tablet, 1 PILL BY MOUTH DAILY, Disp: 90 tablet, Rfl: 1    fluticasone (FLONASE) 50 MCG/ACT nasal spray, 2 sprays by Nasal route nightly, Disp: 1 Bottle, Rfl: 5    vitamin B-12 (CYANOCOBALAMIN) 1000 MCG tablet, Take 1,000 mcg by mouth daily, Disp: , Rfl:     ferrous sulfate 325 (65 Fe) MG tablet, Take 325 mg by mouth daily (with breakfast), Disp: , Rfl:   No Known Allergies    Past Medical History:   Diagnosis Date    Ankle pain     Chronic back pain     Chronic knee pain     Colon polyps     Diverticulitis     Dizzy spells     Essential hypertension 9/18/2019    Fatty liver     Foot pain     Gout     occaisional flareups    Hyperlipidemia     Hypertension     Iron deficiency     Obesity     Routine chest x-ray     Unspecified sleep apnea     Vitamin B12 deficiency     Vitamin D deficiency 11/10/2010     Past Surgical History:   Procedure Laterality Date    COLONOSCOPY  07/20/2017    COLONOSCOPY N/A 8/5/2019    COLONOSCOPY POLYPECTOMY SNARE/COLD BIOPSY performed by Ehsan Wyatt MD at [address] [place] [address]    Right knee    LEG SURGERY  1975    Right leg    ABIMAEL-EN-Y GASTRIC BYPASS  09/27/2010    Dr. Clyde Lema  1994/1995/2003    x3     Family History   Problem Relation Age of Onset    High Blood Pressure Father     High Cholesterol Father     Heart Disease Father     Heart Attack Father     Colon Cancer Father     Other Father         Renal failure    Heart Attack Brother     Heart Disease Brother     Cancer Mother         Ovarian    Breast Cancer Mother     Other Sister         Fibromyalgia    Cancer Maternal Uncle         Brain     Social History     Socioeconomic History    Marital status:      Spouse name: Not on file    Number of children: Not on file    Years of education: Not on file    Highest education level: Not on file   Occupational History    Not on file   Social Needs    Financial resource strain: Not on file    Food insecurity:     Worry: Not on file     Inability: Not on file    Transportation needs:     Medical: Not on file     Non-medical: Not on file   Tobacco Use    Smoking status: Never Smoker    Smokeless tobacco: Current User     Types: Snuff   Substance and Sexual Activity    Alcohol use: Yes     Comment: moderately (1-2 times per month)    Drug use: No    Sexual activity: Yes     Partners: Female   Lifestyle    Physical activity:     Days per week: Not on file     Minutes per session: Not on file    Stress: Not on file   Relationships    Social connections:     Talks on phone: Not on file     Gets together: Not on file     Attends Zoroastrianism service: Not on file     Active member of club or organization: Not on file     Attends meetings of clubs or organizations: Not on file     Relationship status: Not on file    Intimate partner violence:     Fear of current or ex partner: Not on file     Emotionally abused: Not on file     Physically abused: Not on file     Forced sexual activity: Not on file   Other Topics Concern    Not on file   Social History Narrative    Not on file       Vitals:    01/22/20 0733   BP: 126/72   Pulse: 64   SpO2: 96%   Weight: (!) 368 lb (166.9 kg)       Physical Exam      Const: Appears well developed and well nourished. Appears morbidly obese. No signs of acute distress  present. Neck: Supple and symmetric. Palpation reveals no adenopathy. No masses appreciated. Thyroid  exhibits no nodule or thyromegaly. No JVD. Carotids: 2+ and equal bilaterally, without bruits. Resp: No rales, rhonchi or wheezes appreciated over the lungs bilaterally. CV: Rate is regular. Rhythm is regular. S1 is normal. S2 is normal. No gallop or rubs. No heart  murmur appreciated. Extremities: Edema, but no clubbing or cyanosis  Abdomen:  Bowel sounds are normoactive/obese Palpation reveals softness, with no distension,  organomegaly or tenderness. No abdominal masses palpable/upper abdominal midline hernia. No  palpable hepatosplenomegaly. Musculoskeletal: Trace bilateral lower extremity edema with right greater than left. Minimal erythema with some leg skin. Recent cellulitis. Neurovascularly intact. Mild tenderness to palpation right lower leg  Psych: Patient's attitude is cooperative. Patient's affect is appropriate. Judgement is realistic. Insight  is appropriate. Lymph nodes: none palpable    Assessment and Plan:  Chemo Gabriel was seen today for hypertension. Diagnoses and all orders for this visit:    Pulmonary nodules    Acute deep vein thrombosis (DVT) of other specified vein of right lower extremity (HCC)  -     apixaban (ELIQUIS) 5 MG TABS tablet; Take 1 tablet by mouth 2 times daily    Essential hypertension  -     lisinopril-hydrochlorothiazide (PRINZIDE;ZESTORETIC) 10-12.5 MG per tablet; 1 PILL BY MOUTH DAILY  -     CBC Auto Differential; Future  -     Comprehensive Metabolic Panel; Future    Hyperlipidemia, unspecified hyperlipidemia type  -     Lipid Panel; Future    Immunization due  -     INFLUENZA, QUADV, 3 YRS AND OLDER, IM PF, PREFILL SYR OR SDV, 0.5ML (AFLURIA QUADV, PF)    Vitamin D deficiency  -     Vitamin D 25 Hydroxy; Future    Class 3 severe obesity due to excess calories without serious comorbidity with body mass index (BMI) of 45.0 to 49.9 in adult [facility])    Gastric bypass status for obesity  -     IRON AND TIBC; Future  -     FERRITIN; Future    Plan: I will see him back in 4 months and as needed. Flu vaccine given. Blood work today to monitor disease progression medication use. Unfortunately patient is up 8 pounds from last visit and I did encourage weight loss in this gentleman. Continue to monitor blood pressure closely. It has been in a good range on current medical regimen. We will be stopping his anticoagulation upcoming. See above body report.   Notify us with problems in the interim. Return in about 4 months (around 5/22/2020). Seen By:  Jett Flowers MD      *Document was created using voice recognition software. Note was reviewed however may contain grammatical errors.

## 2020-06-01 RX ORDER — FLUTICASONE PROPIONATE 50 MCG
SPRAY, SUSPENSION (ML) NASAL
Qty: 16 G | Refills: 1 | Status: SHIPPED
Start: 2020-06-01 | End: 2020-09-01 | Stop reason: SDUPTHER

## 2020-09-01 NOTE — TELEPHONE ENCOUNTER
Last Appointment:  1/22/2020  No future appointments.   Madison Berman asking for refill on lisinpril and flonase

## 2020-09-02 RX ORDER — LISINOPRIL AND HYDROCHLOROTHIAZIDE 12.5; 1 MG/1; MG/1
TABLET ORAL
Qty: 30 TABLET | Refills: 0 | Status: SHIPPED
Start: 2020-09-02 | End: 2020-10-19 | Stop reason: SDUPTHER

## 2020-09-02 RX ORDER — FLUTICASONE PROPIONATE 50 MCG
1 SPRAY, SUSPENSION (ML) NASAL DAILY
Qty: 16 G | Refills: 0 | Status: SHIPPED
Start: 2020-09-02 | End: 2020-10-19 | Stop reason: SDUPTHER

## 2020-10-19 RX ORDER — LISINOPRIL AND HYDROCHLOROTHIAZIDE 12.5; 1 MG/1; MG/1
TABLET ORAL
Qty: 30 TABLET | Refills: 0 | Status: SHIPPED
Start: 2020-10-19 | End: 2020-11-30 | Stop reason: SDUPTHER

## 2020-10-19 RX ORDER — FLUTICASONE PROPIONATE 50 MCG
1 SPRAY, SUSPENSION (ML) NASAL DAILY
Qty: 16 G | Refills: 0 | Status: SHIPPED
Start: 2020-10-19 | End: 2020-11-30 | Stop reason: SDUPTHER

## 2020-10-19 NOTE — TELEPHONE ENCOUNTER
Jose Spence left message he made an appt 11/30.  Please refill his lisinopril and flonase to [facility]

## 2020-10-19 NOTE — TELEPHONE ENCOUNTER
Last Appointment:  1/22/2020  No future appointments. Wilson Dhruv left message on machine asking for refill on lisinopril and flonase. Per encounter last month dr gave him a 30 day supply and stated he needed an appt.  I left message to assist with scheduling patient and discuss refills more if needed

## 2020-11-30 ENCOUNTER — OFFICE VISIT (OUTPATIENT)
Dept: FAMILY MEDICINE CLINIC | Age: 50
End: 2020-11-30
Payer: MEDICAID

## 2020-11-30 VITALS
WEIGHT: 315 LBS | BODY MASS INDEX: 40.43 KG/M2 | OXYGEN SATURATION: 96 % | HEART RATE: 69 BPM | TEMPERATURE: 97.5 F | DIASTOLIC BLOOD PRESSURE: 94 MMHG | SYSTOLIC BLOOD PRESSURE: 160 MMHG | HEIGHT: 74 IN

## 2020-11-30 DIAGNOSIS — E53.8 VITAMIN B12 DEFICIENCY: ICD-10-CM

## 2020-11-30 DIAGNOSIS — E78.5 HYPERLIPIDEMIA, UNSPECIFIED HYPERLIPIDEMIA TYPE: ICD-10-CM

## 2020-11-30 DIAGNOSIS — E51.9 THIAMIN DEFICIENCY: ICD-10-CM

## 2020-11-30 DIAGNOSIS — Z12.5 SCREENING FOR MALIGNANT NEOPLASM OF PROSTATE: ICD-10-CM

## 2020-11-30 DIAGNOSIS — I10 ESSENTIAL HYPERTENSION: ICD-10-CM

## 2020-11-30 DIAGNOSIS — Z98.84 GASTRIC BYPASS STATUS FOR OBESITY: ICD-10-CM

## 2020-11-30 DIAGNOSIS — E55.9 VITAMIN D DEFICIENCY: ICD-10-CM

## 2020-11-30 LAB
BASOPHILS ABSOLUTE: 0.04 E9/L (ref 0–0.2)
BASOPHILS RELATIVE PERCENT: 0.5 % (ref 0–2)
EOSINOPHILS ABSOLUTE: 0.1 E9/L (ref 0.05–0.5)
EOSINOPHILS RELATIVE PERCENT: 1.2 % (ref 0–6)
HCT VFR BLD CALC: 49.8 % (ref 37–54)
HEMOGLOBIN: 16.7 G/DL (ref 12.5–16.5)
IMMATURE GRANULOCYTES #: 0.05 E9/L
IMMATURE GRANULOCYTES %: 0.6 % (ref 0–5)
LYMPHOCYTES ABSOLUTE: 1.88 E9/L (ref 1.5–4)
LYMPHOCYTES RELATIVE PERCENT: 23.1 % (ref 20–42)
MCH RBC QN AUTO: 29.7 PG (ref 26–35)
MCHC RBC AUTO-ENTMCNC: 33.5 % (ref 32–34.5)
MCV RBC AUTO: 88.6 FL (ref 80–99.9)
MONOCYTES ABSOLUTE: 0.64 E9/L (ref 0.1–0.95)
MONOCYTES RELATIVE PERCENT: 7.9 % (ref 2–12)
NEUTROPHILS ABSOLUTE: 5.43 E9/L (ref 1.8–7.3)
NEUTROPHILS RELATIVE PERCENT: 66.7 % (ref 43–80)
PDW BLD-RTO: 12.5 FL (ref 11.5–15)
PLATELET # BLD: 203 E9/L (ref 130–450)
PMV BLD AUTO: 10.2 FL (ref 7–12)
RBC # BLD: 5.62 E12/L (ref 3.8–5.8)
WBC # BLD: 8.1 E9/L (ref 4.5–11.5)

## 2020-11-30 PROCEDURE — 99214 OFFICE O/P EST MOD 30 MIN: CPT | Performed by: INTERNAL MEDICINE

## 2020-11-30 PROCEDURE — 4004F PT TOBACCO SCREEN RCVD TLK: CPT | Performed by: INTERNAL MEDICINE

## 2020-11-30 PROCEDURE — G8427 DOCREV CUR MEDS BY ELIG CLIN: HCPCS | Performed by: INTERNAL MEDICINE

## 2020-11-30 PROCEDURE — G8482 FLU IMMUNIZE ORDER/ADMIN: HCPCS | Performed by: INTERNAL MEDICINE

## 2020-11-30 PROCEDURE — 90471 IMMUNIZATION ADMIN: CPT | Performed by: INTERNAL MEDICINE

## 2020-11-30 PROCEDURE — 3017F COLORECTAL CA SCREEN DOC REV: CPT | Performed by: INTERNAL MEDICINE

## 2020-11-30 PROCEDURE — G8417 CALC BMI ABV UP PARAM F/U: HCPCS | Performed by: INTERNAL MEDICINE

## 2020-11-30 PROCEDURE — 90686 IIV4 VACC NO PRSV 0.5 ML IM: CPT | Performed by: INTERNAL MEDICINE

## 2020-11-30 RX ORDER — LISINOPRIL AND HYDROCHLOROTHIAZIDE 12.5; 1 MG/1; MG/1
TABLET ORAL
Qty: 90 TABLET | Refills: 1 | Status: SHIPPED
Start: 2020-11-30 | End: 2021-03-31 | Stop reason: SDUPTHER

## 2020-11-30 RX ORDER — FLUTICASONE PROPIONATE 50 MCG
1 SPRAY, SUSPENSION (ML) NASAL DAILY
Qty: 1 BOTTLE | Refills: 5 | Status: SHIPPED
Start: 2020-11-30 | End: 2021-03-31 | Stop reason: SDUPTHER

## 2020-11-30 ASSESSMENT — PATIENT HEALTH QUESTIONNAIRE - PHQ9
2. FEELING DOWN, DEPRESSED OR HOPELESS: 0
1. LITTLE INTEREST OR PLEASURE IN DOING THINGS: 0
SUM OF ALL RESPONSES TO PHQ QUESTIONS 1-9: 0
SUM OF ALL RESPONSES TO PHQ9 QUESTIONS 1 & 2: 0

## 2020-11-30 NOTE — PROGRESS NOTES
[address] [facility] [address] [facility]     20  Shayne Hood : 1970 Sex: male  Age: 44 y.o. Chief Complaint   Patient presents with    Hyperlipidemia    Hypertension       HPI     Patient presents today for long overdue follow-up visit on his medical problems. He ran out of his blood pressure medication couple days ago and his pressure is high today. States normally it is under much better control when he is taking his medicine. His weight is up about 15 pounds since I saw him last it has been continuing to rise. He is up to 383. He has remotely had gastric bypass surgery and does take vitamins and iron related to the above malabsorption. He continues to use CPAP for his sleep apnea which she states has been successful. He has had no gouty episodes. He does have fatty liver which I told him is all the more reason he needs to lose weight. Review of Systems     Constitutional:Negative for fatigue, activity change, appetite change, chills, diaphoresis, fever and unexpected weight change.        Morbidly obese   HENT: Negative for congestion, ear pain, hearing loss, postnasal drip, rhinorrhea and sinus pain.    Respiratory: Negative for cough, shortness of breath and wheezing.    Cardiovascular: Negative for chest pain, palpitations .    Gastrointestinal: Negative for abdominal pain, blood in stool, constipation, diarrhea, nausea and vomiting. Endocrine: Negative.    Genitourinary: Negative for difficulty urinating, dysuria, frequency, hematuria and urgency. Musculoskeletal: Negative for arthralgias, back pain, gait problem and myalgias. Skin:    Denies rashes pruritus or skin lesions. Allergic/Immunologic: Negative for environmental allergies and immunocompromised state. Neurological: Negative for dizziness, weakness, light-headedness, numbness and headaches.    Hematological: Formally anticoagulated for DVT is now finished  Psychiatric/Behavioral: The patient is not nervous/anxious.          Uses CPAP with success          REST OF PERTINENT ROS GONE OVER AND WAS NEGATIVE. PMH:  Health Maintenance:  Colonoscopy - (2017),   EGD - 8/10,  2D ECHO - 8/10  EKG -   Psa Test -   Medical Problems:  Obesity, 3 abd hernias repaired, right knee arthroplasty, Rih  Sleep Apnea - with bipap  gastric bypass -   Vitamin B12 deficiency, Iron deficiency  Type 2 Diabetes - Prior to gastric bypass  Gout, Colon Polyps, Fatty Liver, Diverticulosis  Pulmonary nodules following the CT-completed /stable  DVT treated with anticoagulation. FH:  Mother's side,\" a lot of cancer\". Father:  Heart Disease  MI - 1st age 40  Colon Cancer  Renal failure - Dialysis- age 66. Mother:  Ovarian Cancer, Breast Cancer. Brother 1:  Heart Disease  MI - age 54. Brother 2:  None. Sister 1:  Fibromyalgia. [place] 8:  Health Maintenance:  Colonoscopy - (2017),   EGD - 8/10,  2D ECHO - 10  EKG -   Psa Test -   Medical Problems:  Obesity, 3 abd hernias repaired, right knee arthroplasty, Rih  Sleep Apnea - with bipap  gastric bypass -   Vitamin B12 deficiency, Iron deficiency  Type 2 Diabetes - Prior to gastric bypass  Gout, Colon Polyps, Fatty Liver, Diverticulosi  Pulmonary nodules-followed and stable  FH: Mother's side,\" a lot of cancer\". Father:  Heart Disease  MI - 1st age 40  Colon Cancer  Renal failure - Dialysis- age 66. Mother:  Ovarian Cancer, Breast Cancer. Brother 1:  Heart Disease  MI - age 54. Brother 2:  None. Sister 1:  Fibromyalgia. Maternal Uncle 1 :  brain cancer.   Uncle 1 :  brain cancer. SH:  . 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 Alcohol: Current Alcohol  Use - Admits to 12 pack per week.             Current Outpatient Medications:     fluticasone (FLONASE) 50 MCG/ACT nasal spray, 1 spray by Nasal route daily, Disp: 1 Bottle, Rfl: 5    lisinopril-hydroCHLOROthiazide (PRINZIDE;ZESTORETIC) 10-12.5 MG per tablet, 1 PILL BY MOUTH DAILY, Disp: 90 tablet, Rfl: 1    vitamin B-12 (CYANOCOBALAMIN) 1000 MCG tablet, Take 1,000 mcg by mouth daily, Disp: , Rfl:     ferrous sulfate 325 (65 Fe) MG tablet, Take 325 mg by mouth daily (with breakfast), Disp: , Rfl:   No Known Allergies    Past Medical History:   Diagnosis Date    Ankle pain     Chronic back pain     Chronic knee pain     Colon polyps     Diverticulitis     Dizzy spells     Essential hypertension 9/18/2019    Fatty liver     Foot pain     Gout     occaisional flareups    Hyperlipidemia     Hypertension     Iron deficiency     Obesity     Routine chest x-ray     Unspecified sleep apnea     Vitamin B12 deficiency     Vitamin D deficiency 11/10/2010     Past Surgical History:   Procedure Laterality Date    COLONOSCOPY  07/20/2017    COLONOSCOPY N/A 8/5/2019    COLONOSCOPY POLYPECTOMY SNARE/COLD BIOPSY performed by Sergio Cline MD at [address] [place] [address]    Right knee    LEG SURGERY  1975    Right leg    ABIMAEL-EN-Y GASTRIC BYPASS  09/27/2010    Dr. Anu Sandra  1994/1995/2003    x3     Family History   Problem Relation Age of Onset    High Blood Pressure Father     High Cholesterol Father     Heart Disease Father     Heart Attack Father     Colon Cancer Father     Other Father         Renal failure    Heart Attack Brother     Heart Disease Brother     Cancer Mother         Ovarian    Breast Cancer Mother     Other Sister         Fibromyalgia    Cancer Maternal Uncle         Brain     Social History     Socioeconomic History    Marital status:      Spouse name: Not on file    Number of children: Not on file    Years of education: Not on file    Highest education level: Not on file   Occupational History    Not on file   Social Needs    Financial resource strain: Not on file   Cony-José insecurity     Worry: Not on file     Inability: Not on file    Transportation needs     Medical: Not on file     Non-medical: Not on file   Tobacco Use    Smoking status: Never Smoker    Smokeless tobacco: Current User     Types: Snuff   Substance and Sexual Activity    Alcohol use: Yes     Comment: moderately (1-2 times per month)    Drug use: No    Sexual activity: Yes     Partners: Female   Lifestyle    Physical activity     Days per week: Not on file     Minutes per session: Not on file    Stress: Not on file   Relationships    Social connections     Talks on phone: Not on file     Gets together: Not on file     Attends Religion service: Not on file     Active member of club or organization: Not on file     Attends meetings of clubs or organizations: Not on file     Relationship status: Not on file    Intimate partner violence     Fear of current or ex partner: Not on file     Emotionally abused: Not on file     Physically abused: Not on file     Forced sexual activity: Not on file   Other Topics Concern    Not on file   Social History Narrative    Not on file       Vitals:    11/30/20 1521   BP: (!) 160/94   Pulse: 69   Temp: 97.5 °F (36.4 °C)   TempSrc: Temporal   SpO2: 96%   Weight: (!) 383 lb 3.2 oz (173.8 kg)   Height: 6' 2\" (1.88 m)       Physical Exam    Const: Appears well developed and well nourished. Appears morbidly obese. No signs of acute distress  present. Neck: Supple and symmetric. Palpation reveals no adenopathy. No masses appreciated. Thyroid  exhibits no nodule or thyromegaly. No JVD. Carotids: 2+ and equal bilaterally, without bruits. Resp: No rales, rhonchi or wheezes appreciated over the lungs bilaterally. CV: Rate is regular. Rhythm is regular. S1 is normal. S2 is normal. No gallop or rubs. No heart  murmur appreciated. Extremities: Edema, but no clubbing or cyanosis  Abdomen:  Bowel sounds are normoactive/obese Palpation reveals softness, with no distension,  organomegaly or tenderness. No abdominal masses palpable/upper abdominal midline hernia. No  palpable hepatosplenomegaly. Musculoskeletal: Trace bilateral lower extremity edema with right greater than left.    Psych: Patient's attitude is cooperative. Patient's affect is appropriate. Judgement is realistic. Insight  is appropriate  . Lymph nodes: none palpable         Assessment and Plan:  Sharene Gitelman was seen today for hyperlipidemia and hypertension. Diagnoses and all orders for this visit:    Class 3 severe obesity due to excess calories without serious comorbidity with body mass index (BMI) of 45.0 to 49.9 in adult (HCC)  Weight continues to rise-must lose weight    Essential hypertension  -     lisinopril-hydroCHLOROthiazide (PRINZIDE;ZESTORETIC) 10-12.5 MG per tablet; 1 PILL BY MOUTH DAILY  -     CBC Auto Differential; Future  -     Comprehensive Metabolic Panel; Future  Stable on medication. Vitamin D deficiency  -     Vitamin D 25 Hydroxy; Future    Gastric bypass status for obesity  -     IRON AND TIBC; Future  -     FERRITIN; Future  Remote    Hyperlipidemia, unspecified hyperlipidemia type  -     Lipid Panel; Future  Stable    Thiamin deficiency  -     VITAMIN B1; Future    Vitamin B12 deficiency  -     VITAMIN B12 & FOLATE; Future    Iron deficiency    Screening for malignant neoplasm of prostate  -     Psa screening; Future    Obstructive sleep apnea  Stable on CPAP    Other orders  -     fluticasone (FLONASE) 50 MCG/ACT nasal spray; 1 spray by Nasal route daily  -     INFLUENZA, QUADV, 3 YRS AND OLDER, IM PF, PREFILL SYR OR SDV, 0.5ML (AFLURIA QUADV, PF)    Plan: I will see him back in 4 months and as needed. I told him he must lose weight. Prescription management performed. Blood work to monitor disease progression and medication use. Monitor blood pressure closely. Continue his CPAP. Return in about 4 months (around 3/30/2021).     Seen By:  Noa Montoya MD      *Document was created using voice recognition software. Note was reviewed however may contain grammatical errors.

## 2020-12-01 ENCOUNTER — TELEPHONE (OUTPATIENT)
Dept: FAMILY MEDICINE CLINIC | Age: 50
End: 2020-12-01

## 2020-12-01 LAB
ALBUMIN SERPL-MCNC: 3.8 G/DL (ref 3.5–5.2)
ALP BLD-CCNC: 70 U/L (ref 40–129)
ALT SERPL-CCNC: 14 U/L (ref 0–40)
ANION GAP SERPL CALCULATED.3IONS-SCNC: 12 MMOL/L (ref 7–16)
AST SERPL-CCNC: 16 U/L (ref 0–39)
BILIRUB SERPL-MCNC: 0.2 MG/DL (ref 0–1.2)
BUN BLDV-MCNC: 11 MG/DL (ref 6–20)
CALCIUM SERPL-MCNC: 9.1 MG/DL (ref 8.6–10.2)
CHLORIDE BLD-SCNC: 105 MMOL/L (ref 98–107)
CHOLESTEROL, TOTAL: 153 MG/DL (ref 0–199)
CO2: 24 MMOL/L (ref 22–29)
CREAT SERPL-MCNC: 1 MG/DL (ref 0.7–1.2)
FERRITIN: 50 NG/ML
FOLATE: 15.5 NG/ML (ref 4.8–24.2)
GFR AFRICAN AMERICAN: >60
GFR NON-AFRICAN AMERICAN: >60 ML/MIN/1.73
GLUCOSE BLD-MCNC: 104 MG/DL (ref 74–99)
HDLC SERPL-MCNC: 35 MG/DL
IRON SATURATION: 17 % (ref 20–55)
IRON: 59 MCG/DL (ref 59–158)
LDL CHOLESTEROL CALCULATED: 74 MG/DL (ref 0–99)
POTASSIUM SERPL-SCNC: 4.1 MMOL/L (ref 3.5–5)
PROSTATE SPECIFIC ANTIGEN: 0.5 NG/ML (ref 0–4)
SODIUM BLD-SCNC: 141 MMOL/L (ref 132–146)
TOTAL IRON BINDING CAPACITY: 338 MCG/DL (ref 250–450)
TOTAL PROTEIN: 6.7 G/DL (ref 6.4–8.3)
TRIGL SERPL-MCNC: 220 MG/DL (ref 0–149)
VITAMIN B-12: 263 PG/ML (ref 211–946)
VITAMIN D 25-HYDROXY: 26 NG/ML (ref 30–100)
VLDLC SERPL CALC-MCNC: 44 MG/DL

## 2020-12-01 NOTE — TELEPHONE ENCOUNTER
Vitamin D level has come up from 15-26. This is still less than normal range. See how much she is taking and make sure taking daily. Also vitamin B12 level is on the low end. Again make sure he is taking this daily. Please put in note field to recheck vitamin levels at next visit. He may require IM injections of his B12. Rest of labs looked okay.

## 2020-12-03 NOTE — TELEPHONE ENCOUNTER
Valeriano Common calling in and notified. He just started vitamin [id number] daily. He does not take b12. He has a b complex at home that he does not take. States it has b12 15mcg in it.

## 2020-12-03 NOTE — TELEPHONE ENCOUNTER
If he just started the vitamin D3, go ahead and continue that dose. He needs to  vitamin B12 1000 mcg daily, and start that.

## 2020-12-05 ENCOUNTER — TELEPHONE (OUTPATIENT)
Dept: FAMILY MEDICINE CLINIC | Age: 50
End: 2020-12-05

## 2020-12-05 LAB — VITAMIN B1 WHOLE BLOOD: 152 NMOL/L (ref 70–180)

## 2021-03-31 ENCOUNTER — OFFICE VISIT (OUTPATIENT)
Dept: FAMILY MEDICINE CLINIC | Age: 51
End: 2021-03-31
Payer: MEDICAID

## 2021-03-31 VITALS
DIASTOLIC BLOOD PRESSURE: 70 MMHG | HEIGHT: 74 IN | HEART RATE: 72 BPM | SYSTOLIC BLOOD PRESSURE: 124 MMHG | TEMPERATURE: 96.7 F | WEIGHT: 315 LBS | BODY MASS INDEX: 40.43 KG/M2 | OXYGEN SATURATION: 98 %

## 2021-03-31 DIAGNOSIS — E55.9 VITAMIN D DEFICIENCY: ICD-10-CM

## 2021-03-31 DIAGNOSIS — I10 ESSENTIAL HYPERTENSION: ICD-10-CM

## 2021-03-31 DIAGNOSIS — E61.1 IRON DEFICIENCY: Primary | ICD-10-CM

## 2021-03-31 DIAGNOSIS — D51.1 VIT B12 DEFIC ANEMIA D/T SLCTV VIT B12 MALABSORP W PROTEIN: ICD-10-CM

## 2021-03-31 DIAGNOSIS — E61.1 IRON DEFICIENCY: ICD-10-CM

## 2021-03-31 LAB
ALBUMIN SERPL-MCNC: 4 G/DL (ref 3.5–5.2)
ALP BLD-CCNC: 69 U/L (ref 40–129)
ALT SERPL-CCNC: 15 U/L (ref 0–40)
ANION GAP SERPL CALCULATED.3IONS-SCNC: 14 MMOL/L (ref 7–16)
AST SERPL-CCNC: 19 U/L (ref 0–39)
BASOPHILS ABSOLUTE: 0.04 E9/L (ref 0–0.2)
BASOPHILS RELATIVE PERCENT: 0.4 % (ref 0–2)
BILIRUB SERPL-MCNC: 0.4 MG/DL (ref 0–1.2)
BUN BLDV-MCNC: 14 MG/DL (ref 6–20)
CALCIUM SERPL-MCNC: 9 MG/DL (ref 8.6–10.2)
CHLORIDE BLD-SCNC: 108 MMOL/L (ref 98–107)
CO2: 22 MMOL/L (ref 22–29)
CREAT SERPL-MCNC: 1.1 MG/DL (ref 0.7–1.2)
EOSINOPHILS ABSOLUTE: 0.1 E9/L (ref 0.05–0.5)
EOSINOPHILS RELATIVE PERCENT: 1.1 % (ref 0–6)
FERRITIN: 230 NG/ML
GFR AFRICAN AMERICAN: >60
GFR NON-AFRICAN AMERICAN: >60 ML/MIN/1.73
GLUCOSE BLD-MCNC: 76 MG/DL (ref 74–99)
HCT VFR BLD CALC: 53.2 % (ref 37–54)
HEMOGLOBIN: 17.5 G/DL (ref 12.5–16.5)
IMMATURE GRANULOCYTES #: 0.05 E9/L
IMMATURE GRANULOCYTES %: 0.5 % (ref 0–5)
IRON SATURATION: 31 % (ref 20–55)
IRON: 88 MCG/DL (ref 59–158)
LYMPHOCYTES ABSOLUTE: 2.14 E9/L (ref 1.5–4)
LYMPHOCYTES RELATIVE PERCENT: 22.7 % (ref 20–42)
MCH RBC QN AUTO: 30.8 PG (ref 26–35)
MCHC RBC AUTO-ENTMCNC: 32.9 % (ref 32–34.5)
MCV RBC AUTO: 93.7 FL (ref 80–99.9)
MONOCYTES ABSOLUTE: 0.98 E9/L (ref 0.1–0.95)
MONOCYTES RELATIVE PERCENT: 10.4 % (ref 2–12)
NEUTROPHILS ABSOLUTE: 6.1 E9/L (ref 1.8–7.3)
NEUTROPHILS RELATIVE PERCENT: 64.9 % (ref 43–80)
PDW BLD-RTO: 13.3 FL (ref 11.5–15)
PLATELET # BLD: 223 E9/L (ref 130–450)
PMV BLD AUTO: 10.1 FL (ref 7–12)
POTASSIUM SERPL-SCNC: 4.2 MMOL/L (ref 3.5–5)
RBC # BLD: 5.68 E12/L (ref 3.8–5.8)
SODIUM BLD-SCNC: 144 MMOL/L (ref 132–146)
TOTAL IRON BINDING CAPACITY: 286 MCG/DL (ref 250–450)
TOTAL PROTEIN: 7 G/DL (ref 6.4–8.3)
VITAMIN B-12: 332 PG/ML (ref 211–946)
VITAMIN D 25-HYDROXY: 38 NG/ML (ref 30–100)
WBC # BLD: 9.4 E9/L (ref 4.5–11.5)

## 2021-03-31 PROCEDURE — 4004F PT TOBACCO SCREEN RCVD TLK: CPT | Performed by: INTERNAL MEDICINE

## 2021-03-31 PROCEDURE — G8427 DOCREV CUR MEDS BY ELIG CLIN: HCPCS | Performed by: INTERNAL MEDICINE

## 2021-03-31 PROCEDURE — 99213 OFFICE O/P EST LOW 20 MIN: CPT | Performed by: INTERNAL MEDICINE

## 2021-03-31 PROCEDURE — G8417 CALC BMI ABV UP PARAM F/U: HCPCS | Performed by: INTERNAL MEDICINE

## 2021-03-31 PROCEDURE — 3017F COLORECTAL CA SCREEN DOC REV: CPT | Performed by: INTERNAL MEDICINE

## 2021-03-31 PROCEDURE — G8482 FLU IMMUNIZE ORDER/ADMIN: HCPCS | Performed by: INTERNAL MEDICINE

## 2021-03-31 RX ORDER — FLUTICASONE PROPIONATE 50 MCG
1 SPRAY, SUSPENSION (ML) NASAL DAILY
Qty: 1 BOTTLE | Refills: 5 | Status: SHIPPED
Start: 2021-03-31 | End: 2021-07-26 | Stop reason: SDUPTHER

## 2021-03-31 RX ORDER — LISINOPRIL AND HYDROCHLOROTHIAZIDE 12.5; 1 MG/1; MG/1
TABLET ORAL
Qty: 90 TABLET | Refills: 1 | Status: SHIPPED
Start: 2021-03-31 | End: 2021-07-26 | Stop reason: SDUPTHER

## 2021-03-31 ASSESSMENT — PATIENT HEALTH QUESTIONNAIRE - PHQ9
1. LITTLE INTEREST OR PLEASURE IN DOING THINGS: 0
SUM OF ALL RESPONSES TO PHQ QUESTIONS 1-9: 0

## 2021-03-31 NOTE — PROGRESS NOTES
headaches. Hematological: Formally was anticoagulated for DVT is now finished  Psychiatric/Behavioral: The patient is not nervous/anxious.          Uses CPAP with success         REST OF PERTINENT ROS GONE OVER AND WAS NEGATIVE. PMH:  Health Maintenance:  Colonoscopy - (2017),   EGD - 8/10,-  2D ECHO - 8/10  EKG -   Psa Test - , 3/21  Medical Problems:  Obesity, 3 abd hernias repaired, right knee arthroplasty, Rih  Sleep Apnea - with bipap  gastric bypass -   Vitamin B12 deficiency, Iron deficiency  Type 2 Diabetes - Prior to gastric bypass  Gout, Colon Polyps, Fatty Liver, Diverticulosis  Pulmonary nodules following the CT-completed 2019/stable  DVT treated with anticoagulation. FH:  Mother's side,\" a lot of cancer\". Father:  Heart Disease  MI - 1st age 40  Colon Cancer  Renal failure - Dialysis- age 66. Mother:  Ovarian Cancer, Breast Cancer. Brother 1:  Heart Disease  MI - age 54. Brother 2:  None. Sister 1:  Fibromyalgia. [place]giss[place] 8:  Health Maintenance:  Colonoscopy - (2017),   EGD - 8/10,  2D ECHO - 810  EKG -   Psa Test -   Medical Problems:  Obesity, 3 abd hernias repaired, right knee arthroplasty, Rih  Sleep Apnea - with bipap  gastric bypass -   Vitamin B12 deficiency, Iron deficiency  Type 2 Diabetes - Prior to gastric bypass  Gout, Colon Polyps, Fatty Liver, Diverticulosi  Pulmonary nodules-followed and stable  FH: Mother's side,\" a lot of cancer\". Father:  Heart Disease  MI - 1st age 40  Colon Cancer  Renal failure - Dialysis- age 66. Mother:  Ovarian Cancer, Breast Cancer. Brother 1:  Heart Disease  MI - age 54. Brother 2:  None. Sister 1:  Fibromyalgia. Maternal Uncle 1 :  brain cancer.   Uncle 1 :  brain cancer. SH:  . 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 Alcohol: Current Alcohol  Use - Admits to 12 pack per week.                 Current Outpatient Medications:     lisinopril-hydroCHLOROthiazide (PRINZIDE;ZESTORETIC) 10-12.5 MG per tablet, 1 PILL BY MOUTH DAILY, Disp: 90 tablet, Rfl: 1    VITAMIN D PO, Take by mouth, Disp: , Rfl:     fluticasone (FLONASE) 50 MCG/ACT nasal spray, 1 spray by Nasal route daily, Disp: 1 Bottle, Rfl: 5    vitamin B-12 (CYANOCOBALAMIN) 1000 MCG tablet, Take 1,000 mcg by mouth daily, Disp: , Rfl:     ferrous sulfate 325 (65 Fe) MG tablet, Take 325 mg by mouth daily (with breakfast), Disp: , Rfl:   No Known Allergies    Past Medical History:   Diagnosis Date    Ankle pain     Chronic back pain     Chronic knee pain     Colon polyps     Diverticulitis     Dizzy spells     Essential hypertension 9/18/2019    Fatty liver     Foot pain     Gout     occaisional flareups    Hyperlipidemia     Hypertension     Iron deficiency     Obesity     Routine chest x-ray     Unspecified sleep apnea     Vitamin B12 deficiency     Vitamin D deficiency 11/10/2010     Past Surgical History:   Procedure Laterality Date    COLONOSCOPY  07/20/2017    COLONOSCOPY N/A 8/5/2019    COLONOSCOPY POLYPECTOMY SNARE/COLD BIOPSY performed by Susy Nissen, MD at [address] [place] [address]    Right knee    LEG SURGERY  1975    Right leg    ABIMAEL-EN-Y GASTRIC BYPASS  09/27/2010    Dr. Jose Alejandro Steel  1994/1995/2003    x3     Family History   Problem Relation Age of Onset    High Blood Pressure Father     High Cholesterol Father     Heart Disease Father     Heart Attack Father     Colon Cancer Father     Other Father         Renal failure    Heart Attack Brother     Heart Disease Brother     Cancer Mother         Ovarian    Breast Cancer Mother     Other Sister         Fibromyalgia    Cancer Maternal Uncle         Brain     Social History     Socioeconomic History    Marital status:      Spouse name: Not on file    Number of children: Not on file    Years of education: Not on file    Highest education level: Not on file   Occupational History    Not on file   Social Needs    Financial resource strain: Not on file    Food insecurity     Worry: Not on file     Inability: Not on file    Transportation needs     Medical: Not on file     Non-medical: Not on file   Tobacco Use    Smoking status: Never Smoker    Smokeless tobacco: Current User     Types: Snuff   Substance and Sexual Activity    Alcohol use: Yes     Comment: moderately (1-2 times per month)    Drug use: No    Sexual activity: Yes     Partners: Female   Lifestyle    Physical activity     Days per week: Not on file     Minutes per session: Not on file    Stress: Not on file   Relationships    Social connections     Talks on phone: Not on file     Gets together: Not on file     Attends Pentecostal service: Not on file     Active member of club or organization: Not on file     Attends meetings of clubs or organizations: Not on file     Relationship status: Not on file    Intimate partner violence     Fear of current or ex partner: Not on file     Emotionally abused: Not on file     Physically abused: Not on file     Forced sexual activity: Not on file   Other Topics Concern    Not on file   Social History Narrative    Not on file       Vitals:    03/31/21 1527   BP: 124/70   Pulse: 72   Temp: 96.7 °F (35.9 °C)   TempSrc: Temporal   SpO2: 98%   Weight: (!) 375 lb 6.4 oz (170.3 kg)   Height: 6' 2\" (1.88 m)       Physical Exam    Const: Appears well developed and well nourished. Appears morbidly obese. No signs of acute distress  present. Neck: Supple and symmetric. Palpation reveals no adenopathy. No masses appreciated. Thyroid  exhibits no nodule or thyromegaly. No JVD. Carotids: 2+ and equal bilaterally, without bruits. Resp: No rales, rhonchi or wheezes appreciated over the lungs bilaterally. CV: Rate is regular. Rhythm is regular. S1 is normal. S2 is normal. No gallop or rubs.  No heart murmur appreciated. Extremities: Edema, but no clubbing or cyanosis  Abdomen: Bowel sounds are normoactive/obese Palpation reveals softness, with no distension,  organomegaly or tenderness. No abdominal masses palpable/upper abdominal midline hernia. No  palpable hepatosplenomegaly. Musculoskeletal: Trace bilateral lower extremity edema with right greater than left.    Psych: Patient's attitude is cooperative. Patient's affect is appropriate. Judgement is realistic. Insight  is appropriate  . Lymph nodes: none palpable             Assessment and Plan:  Jose G Sims was seen today for hypertension. Diagnoses and all orders for this visit:    Iron deficiency  -     IRON AND TIBC; Future  -     FERRITIN; Future    Essential hypertension  -     lisinopril-hydroCHLOROthiazide (PRINZIDE;ZESTORETIC) 10-12.5 MG per tablet; 1 PILL BY MOUTH DAILY  -     CBC Auto Differential; Future  -     Comprehensive Metabolic Panel; Future    Vitamin D deficiency  -     Vitamin D 25 Hydroxy; Future    Vit B12 defic anemia d/t slctv vit B12 malabsorp w protein  -     VITAMIN B12; Future    Other orders  -     fluticasone (FLONASE) 50 MCG/ACT nasal spray; 1 spray by Nasal route daily    Plan: Blood work today to monitor disease progression medication use. Prescription management performed after decision to continue medication that is successfully treating patient's chronic medical condition. Continue weight loss attempts. Encouraged him to start checking blood pressure. See back in 4 months and as needed. Notify us of problems in the interim. Return in about 4 months (around 7/31/2021). Seen By:  Kasia Farris MD      *Document was created using voice recognition software. Note was reviewed however may contain grammatical errors.

## 2021-04-01 ENCOUNTER — TELEPHONE (OUTPATIENT)
Dept: FAMILY MEDICINE CLINIC | Age: 51
End: 2021-04-01

## 2021-04-01 DIAGNOSIS — D75.1 POLYCYTHEMIA: Primary | ICD-10-CM

## 2021-04-01 NOTE — TELEPHONE ENCOUNTER
Vitamin B12 and vitamin D3 have come up. Iron level was good. Only abnormality is hemoglobin up at 17.5. Make sure he is using his CPAP nightly. Recheck H&H in about a month.

## 2021-07-26 ENCOUNTER — OFFICE VISIT (OUTPATIENT)
Dept: FAMILY MEDICINE CLINIC | Age: 51
End: 2021-07-26
Payer: MEDICAID

## 2021-07-26 VITALS
BODY MASS INDEX: 40.43 KG/M2 | SYSTOLIC BLOOD PRESSURE: 136 MMHG | TEMPERATURE: 97.4 F | OXYGEN SATURATION: 97 % | DIASTOLIC BLOOD PRESSURE: 86 MMHG | HEIGHT: 74 IN | WEIGHT: 315 LBS | HEART RATE: 70 BPM

## 2021-07-26 DIAGNOSIS — G47.33 OBSTRUCTIVE SLEEP APNEA: ICD-10-CM

## 2021-07-26 DIAGNOSIS — D75.1 POLYCYTHEMIA: ICD-10-CM

## 2021-07-26 DIAGNOSIS — D51.1 VIT B12 DEFIC ANEMIA D/T SLCTV VIT B12 MALABSORP W PROTEIN: ICD-10-CM

## 2021-07-26 DIAGNOSIS — I10 ESSENTIAL HYPERTENSION: ICD-10-CM

## 2021-07-26 DIAGNOSIS — E55.9 VITAMIN D DEFICIENCY: ICD-10-CM

## 2021-07-26 DIAGNOSIS — E66.01 CLASS 3 SEVERE OBESITY DUE TO EXCESS CALORIES WITHOUT SERIOUS COMORBIDITY WITH BODY MASS INDEX (BMI) OF 45.0 TO 49.9 IN ADULT (HCC): ICD-10-CM

## 2021-07-26 DIAGNOSIS — Z98.84 GASTRIC BYPASS STATUS FOR OBESITY: ICD-10-CM

## 2021-07-26 LAB
ALBUMIN SERPL-MCNC: 3.8 G/DL (ref 3.5–5.2)
ALP BLD-CCNC: 63 U/L (ref 40–129)
ALT SERPL-CCNC: 18 U/L (ref 0–40)
ANION GAP SERPL CALCULATED.3IONS-SCNC: 15 MMOL/L (ref 7–16)
AST SERPL-CCNC: 19 U/L (ref 0–39)
BASOPHILS ABSOLUTE: 0.06 E9/L (ref 0–0.2)
BASOPHILS RELATIVE PERCENT: 0.7 % (ref 0–2)
BILIRUB SERPL-MCNC: 0.3 MG/DL (ref 0–1.2)
BUN BLDV-MCNC: 13 MG/DL (ref 6–20)
CALCIUM SERPL-MCNC: 9.1 MG/DL (ref 8.6–10.2)
CHLORIDE BLD-SCNC: 106 MMOL/L (ref 98–107)
CO2: 23 MMOL/L (ref 22–29)
CREAT SERPL-MCNC: 1.1 MG/DL (ref 0.7–1.2)
EOSINOPHILS ABSOLUTE: 0.1 E9/L (ref 0.05–0.5)
EOSINOPHILS RELATIVE PERCENT: 1.2 % (ref 0–6)
GFR AFRICAN AMERICAN: >60
GFR NON-AFRICAN AMERICAN: >60 ML/MIN/1.73
GLUCOSE BLD-MCNC: 90 MG/DL (ref 74–99)
HCT VFR BLD CALC: 52.3 % (ref 37–54)
HEMOGLOBIN: 16.6 G/DL (ref 12.5–16.5)
IMMATURE GRANULOCYTES #: 0.05 E9/L
IMMATURE GRANULOCYTES %: 0.6 % (ref 0–5)
LYMPHOCYTES ABSOLUTE: 2.06 E9/L (ref 1.5–4)
LYMPHOCYTES RELATIVE PERCENT: 24.2 % (ref 20–42)
MCH RBC QN AUTO: 30 PG (ref 26–35)
MCHC RBC AUTO-ENTMCNC: 31.7 % (ref 32–34.5)
MCV RBC AUTO: 94.4 FL (ref 80–99.9)
MONOCYTES ABSOLUTE: 0.86 E9/L (ref 0.1–0.95)
MONOCYTES RELATIVE PERCENT: 10.1 % (ref 2–12)
NEUTROPHILS ABSOLUTE: 5.39 E9/L (ref 1.8–7.3)
NEUTROPHILS RELATIVE PERCENT: 63.2 % (ref 43–80)
PDW BLD-RTO: 13.1 FL (ref 11.5–15)
PLATELET # BLD: 214 E9/L (ref 130–450)
PMV BLD AUTO: 10 FL (ref 7–12)
POTASSIUM SERPL-SCNC: 4.1 MMOL/L (ref 3.5–5)
RBC # BLD: 5.54 E12/L (ref 3.8–5.8)
SODIUM BLD-SCNC: 144 MMOL/L (ref 132–146)
TOTAL PROTEIN: 6.8 G/DL (ref 6.4–8.3)
VITAMIN B-12: 307 PG/ML (ref 211–946)
VITAMIN D 25-HYDROXY: 39 NG/ML (ref 30–100)
WBC # BLD: 8.5 E9/L (ref 4.5–11.5)

## 2021-07-26 PROCEDURE — 99214 OFFICE O/P EST MOD 30 MIN: CPT | Performed by: INTERNAL MEDICINE

## 2021-07-26 PROCEDURE — G8427 DOCREV CUR MEDS BY ELIG CLIN: HCPCS | Performed by: INTERNAL MEDICINE

## 2021-07-26 PROCEDURE — 4004F PT TOBACCO SCREEN RCVD TLK: CPT | Performed by: INTERNAL MEDICINE

## 2021-07-26 PROCEDURE — 3017F COLORECTAL CA SCREEN DOC REV: CPT | Performed by: INTERNAL MEDICINE

## 2021-07-26 PROCEDURE — G8417 CALC BMI ABV UP PARAM F/U: HCPCS | Performed by: INTERNAL MEDICINE

## 2021-07-26 RX ORDER — LISINOPRIL AND HYDROCHLOROTHIAZIDE 12.5; 1 MG/1; MG/1
TABLET ORAL
Qty: 90 TABLET | Refills: 1 | Status: SHIPPED
Start: 2021-07-26 | End: 2021-11-24 | Stop reason: SDUPTHER

## 2021-07-26 RX ORDER — FLUTICASONE PROPIONATE 50 MCG
1 SPRAY, SUSPENSION (ML) NASAL DAILY
Qty: 1 BOTTLE | Refills: 5 | Status: SHIPPED
Start: 2021-07-26 | End: 2021-11-24 | Stop reason: SDUPTHER

## 2021-07-26 SDOH — ECONOMIC STABILITY: FOOD INSECURITY: WITHIN THE PAST 12 MONTHS, THE FOOD YOU BOUGHT JUST DIDN'T LAST AND YOU DIDN'T HAVE MONEY TO GET MORE.: NEVER TRUE

## 2021-07-26 SDOH — ECONOMIC STABILITY: FOOD INSECURITY: WITHIN THE PAST 12 MONTHS, YOU WORRIED THAT YOUR FOOD WOULD RUN OUT BEFORE YOU GOT MONEY TO BUY MORE.: NEVER TRUE

## 2021-07-26 ASSESSMENT — SOCIAL DETERMINANTS OF HEALTH (SDOH): HOW HARD IS IT FOR YOU TO PAY FOR THE VERY BASICS LIKE FOOD, HOUSING, MEDICAL CARE, AND HEATING?: NOT HARD AT ALL

## 2021-07-27 ENCOUNTER — TELEPHONE (OUTPATIENT)
Dept: FAMILY MEDICINE CLINIC | Age: 51
End: 2021-07-27

## 2021-07-27 NOTE — PROGRESS NOTES
[id number] [facility]     21  Cristina Galvan : 1970 Sex: male  Age: 46 y.o. Chief Complaint   Patient presents with    Hypertension     4 months       HPI  Patient presents today for 4-month follow-up visit on his medical problems. He has not been able to lose any weight and stands at 374 pounds with body mass index of 48. He is status post gastric bypass remotely. He attributes lack of loss of weight to inactivity and not enough time to exercise. We did discuss limiting intake also. He has fatty liver diagnosed in weight is not helping matters. Tells me his blood pressures been in a good range. Does not bring any numbers in. I did ask him to start documenting and bring in some numbers. His obstructive sleep apnea is stable on his BiPAP. No further gouty episodes. Review of Systems   Constitutional:Negative for fatigue, activity change, appetite change, chills, diaphoresis, fever and unexpected weight change.        Morbidly obese   HENT: Negative for congestion, ear pain, hearing loss, postnasal drip, rhinorrhea and sinus pain.    Respiratory: Negative for cough, shortness of breath and wheezing.    Cardiovascular: Negative for chest pain, palpitations .    Gastrointestinal: Negative for abdominal pain, blood in stool, constipation, diarrhea, nausea and vomiting. Endocrine: Negative.    Genitourinary: Negative for difficulty urinating, dysuria, frequency, hematuria and urgency. Musculoskeletal: Negative for arthralgias, back pain, gait problem and myalgias. Does describe recent emergency room visit for right shoulder pain diagnosed as trapezius strain slowly improving  Skin:    Denies rashes pruritus or skin lesions.   Allergic/Immunologic: Negative for environmental allergies and immunocompromised state. Neurological: Negative for dizziness, weakness, light-headedness, numbness and headaches.    Hematological: Formally was anticoagulated for DVT is now finished  Psychiatric/Behavioral: The patient is not nervous/anxious.          Uses CPAP with success          REST OF PERTINENT ROS GONE OVER AND WAS NEGATIVE. PMH:  Health Maintenance:  Colonoscopy - (2017),   EGD - 8/10,  2D ECHO - 8/10  EKG -   Psa Test - , 3/21  Medical Problems:  Obesity, 3 abd hernias repaired, right knee arthroplasty, Rih  Sleep Apnea - with bipap  gastric bypass -   Vitamin B12 deficiency, Iron deficiency  Type 2 Diabetes - Prior to gastric bypass  Gout, Colon Polyps, Fatty Liver, Diverticulosis  Pulmonary nodules following the CT-completed 2019/stable  DVT treated with anticoagulation. FH:  Mother's side,\" a lot of cancer\". Father:  Heart Disease  MI - 1st age 40  Colon Cancer  Renal failure - Dialysis- age 66. Mother:  Ovarian Cancer, Breast Cancer. Brother 1:  Heart Disease  MI - age 54. Brother 2:  None. Sister 1:  Fibromyalgia. [place] 8:  Health Maintenance:  Colonoscopy - (2017),   EGD - 8/10,-  2D ECHO - 10  EKG -   Psa Test -   Medical Problems:  Obesity, 3 abd hernias repaired, right knee arthroplasty, Rih  Sleep Apnea - with bipap  gastric bypass -   Vitamin B12 deficiency, Iron deficiency  Type 2 Diabetes - Prior to gastric bypass  Gout, Colon Polyps, Fatty Liver, Diverticulosi  Pulmonary nodules-followed and stable  Hypertension  FH: Mother's side,\" a lot of cancer\". Father:  Heart Disease  MI - 1st age 40  Colon Cancer  Renal failure - Dialysis- age 66. Mother:  Ovarian Cancer, Breast Cancer. Brother 1:  Heart Disease  MI - age 54. Brother 2:  None. Sister 1:  Fibromyalgia. Maternal Uncle 1 :  brain cancer.   Uncle 1 :  brain cancer. SH:  . 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 Alcohol: Current Alcohol  Use - Admits to 12 pack per week.                 Current Outpatient Medications:    lisinopril-hydroCHLOROthiazide (PRINZIDE;ZESTORETIC) 10-12.5 MG per tablet, 1 PILL BY MOUTH DAILY, Disp: 90 tablet, Rfl: 1    fluticasone (FLONASE) 50 MCG/ACT nasal spray, 1 spray by Nasal route daily, Disp: 1 Bottle, Rfl: 5    VITAMIN D PO, Take by mouth, Disp: , Rfl:     vitamin B-12 (CYANOCOBALAMIN) 1000 MCG tablet, Take 1,000 mcg by mouth daily, Disp: , Rfl:     ferrous sulfate 325 (65 Fe) MG tablet, Take 325 mg by mouth daily (with breakfast), Disp: , Rfl:   No Known Allergies    Past Medical History:   Diagnosis Date    Ankle pain     Chronic back pain     Chronic knee pain     Colon polyps     Diverticulitis     Dizzy spells     Essential hypertension 9/18/2019    Fatty liver     Foot pain     Gout     occaisional flareups    Hyperlipidemia     Hypertension     Iron deficiency     Obesity     Routine chest x-ray     Unspecified sleep apnea     Vitamin B12 deficiency     Vitamin D deficiency 11/10/2010     Past Surgical History:   Procedure Laterality Date    COLONOSCOPY  07/20/2017    COLONOSCOPY N/A 8/5/2019    COLONOSCOPY POLYPECTOMY SNARE/COLD BIOPSY performed by Riya Li MD at [address] [place] [address]    Right knee    LEG SURGERY  1975    Right leg    ABIMAEL-EN-Y GASTRIC BYPASS  09/27/2010    Dr. Terry Winkler  1994/1995/2003    x3     Family History   Problem Relation Age of Onset    High Blood Pressure Father     High Cholesterol Father     Heart Disease Father     Heart Attack Father     Colon Cancer Father     Other Father         Renal failure    Heart Attack Brother     Heart Disease Brother     Cancer Mother         Ovarian    Breast Cancer Mother     Other Sister         Fibromyalgia    Cancer Maternal Uncle         Brain     Social History     Socioeconomic History    Marital status:      Spouse name: Not on file    Number of children: Not on file    Years of education: Not on file    Highest education level: Not on file   Occupational History    Not on file   Tobacco Use    Smoking status: Never Smoker    Smokeless tobacco: Current User     Types: Snuff   Vaping Use    Vaping Use: Never used   Substance and Sexual Activity    Alcohol use: Yes     Comment: moderately (1-2 times per month)    Drug use: No    Sexual activity: Yes     Partners: Female   Other Topics Concern    Not on file   Social History Narrative    Not on file     Social Determinants of Health     Financial Resource Strain: Low Risk     Difficulty of Paying Living Expenses: Not hard at all   Food Insecurity: No Food Insecurity    Worried About Running Out of Food in the Last Year: Never true    Kei of Food in the Last Year: Never true   Transportation Needs:     Lack of Transportation (Medical):  Lack of Transportation (Non-Medical):    Physical Activity:     Days of Exercise per Week:     Minutes of Exercise per Session:    Stress:     Feeling of Stress :    Social Connections:     Frequency of Communication with Friends and Family:     Frequency of Social Gatherings with Friends and Family:     Attends Spiritism Services:     Active Member of Clubs or Organizations:     Attends Club or Organization Meetings:     Marital Status:    Intimate Partner Violence:     Fear of Current or Ex-Partner:     Emotionally Abused:     Physically Abused:     Sexually Abused:        Vitals:    07/26/21 1529 07/26/21 2018   BP: (!) 144/74 136/86   Pulse: 70    Temp: 97.4 °F (36.3 °C)    TempSrc: Temporal    SpO2: 97%    Weight: (!) 374 lb (169.6 kg)    Height: 6' 2\" (1.88 m)        Physical Exam    Const: Appears well developed and well nourished. Appears morbidly obese. No signs of acute distress  present. Neck: Supple and symmetric. Palpation reveals no adenopathy. No masses appreciated. Thyroid  exhibits no nodule or thyromegaly. No JVD. Carotids: 2+ and equal bilaterally, without bruits.   Resp: No rales, rhonchi or wheezes appreciated over the lungs bilaterally. CV: Rate is regular. Rhythm is regular. S1 is normal. S2 is normal. No gallop or rubs. No heart  murmur appreciated. Extremities: Edema, but no clubbing or cyanosis  Abdomen: Bowel sounds are normoactive/obese Palpation reveals softness, with no distension,  organomegaly or tenderness. No abdominal masses palpable/upper abdominal midline hernia. No  palpable hepatosplenomegaly. Musculoskeletal: Trace bilateral lower extremity edema with right greater than left.    Psych: Patient's attitude is cooperative. Patient's affect is appropriate. Judgement is realistic. Insight  is appropriate  . Lymph nodes: none palpable         Assessment and Plan:  Katherin Hollins was seen today for hypertension. Diagnoses and all orders for this visit:    Essential hypertension  -     lisinopril-hydroCHLOROthiazide (PRINZIDE;ZESTORETIC) 10-12.5 MG per tablet; 1 PILL BY MOUTH DAILY  -     Comprehensive Metabolic Panel; Future  -     CBC Auto Differential; Future  Stable on current antihypertensive medication. Polycythemia   mild    Vit B12 defic anemia d/t slctv vit B12 malabsorp w protein  -     VITAMIN B12; Future  Stable on replacement    Vitamin D deficiency  -     Vitamin D 25 Hydroxy; Future  On replacement    Class 3 severe obesity due to excess calories without serious comorbidity with body mass index (BMI) of 45.0 to 49.9 in adult [facility])  Poorly controlled    Gastric bypass status for obesity    Obstructive sleep apnea  Able on BiPAP    Other orders  -     fluticasone (FLONASE) 50 MCG/ACT nasal spray; 1 spray by Nasal route daily    Plan: I will see him back in 4 months and as needed. Blood work to monitor disease progression and medication use. Prescription management performed. I did have discussion regarding his colonoscopy which is due next year and I do have marked on his chart EGD was to be due last year but I cannot find the procedure report.   Did ask him to call his endoscopist regarding this and see when they feel he is due for his upper scope again. It is significantly important he loses weight. Monitor blood pressure closely. Bring in numbers. Notify us of problems in the interim. Return in about 4 months (around 11/26/2021). Seen By:  Pritesh Quintero MD      *Document was created using voice recognition software. Note was reviewed however may contain grammatical errors.

## 2021-07-28 NOTE — TELEPHONE ENCOUNTER
Letter sent to patient letting him know that his labs came back and are stable. Attached a copy of the results for his records. Added notes to next appointment note.

## 2021-11-24 ENCOUNTER — OFFICE VISIT (OUTPATIENT)
Dept: FAMILY MEDICINE CLINIC | Age: 51
End: 2021-11-24
Payer: MEDICAID

## 2021-11-24 VITALS
OXYGEN SATURATION: 95 % | SYSTOLIC BLOOD PRESSURE: 122 MMHG | WEIGHT: 315 LBS | DIASTOLIC BLOOD PRESSURE: 70 MMHG | HEART RATE: 72 BPM | HEIGHT: 74 IN | TEMPERATURE: 97.8 F | BODY MASS INDEX: 40.43 KG/M2

## 2021-11-24 DIAGNOSIS — G47.33 OBSTRUCTIVE SLEEP APNEA: Primary | ICD-10-CM

## 2021-11-24 DIAGNOSIS — E66.01 CLASS 3 SEVERE OBESITY DUE TO EXCESS CALORIES WITH BODY MASS INDEX (BMI) OF 45.0 TO 49.9 IN ADULT, UNSPECIFIED WHETHER SERIOUS COMORBIDITY PRESENT (HCC): ICD-10-CM

## 2021-11-24 DIAGNOSIS — E55.9 VITAMIN D DEFICIENCY: ICD-10-CM

## 2021-11-24 DIAGNOSIS — I10 ESSENTIAL HYPERTENSION: ICD-10-CM

## 2021-11-24 DIAGNOSIS — E78.5 HYPERLIPIDEMIA, UNSPECIFIED HYPERLIPIDEMIA TYPE: ICD-10-CM

## 2021-11-24 DIAGNOSIS — D51.1 VIT B12 DEFIC ANEMIA D/T SLCTV VIT B12 MALABSORP W PROTEIN: ICD-10-CM

## 2021-11-24 PROCEDURE — G8484 FLU IMMUNIZE NO ADMIN: HCPCS | Performed by: INTERNAL MEDICINE

## 2021-11-24 PROCEDURE — G8427 DOCREV CUR MEDS BY ELIG CLIN: HCPCS | Performed by: INTERNAL MEDICINE

## 2021-11-24 PROCEDURE — G8417 CALC BMI ABV UP PARAM F/U: HCPCS | Performed by: INTERNAL MEDICINE

## 2021-11-24 PROCEDURE — 4004F PT TOBACCO SCREEN RCVD TLK: CPT | Performed by: INTERNAL MEDICINE

## 2021-11-24 PROCEDURE — 3017F COLORECTAL CA SCREEN DOC REV: CPT | Performed by: INTERNAL MEDICINE

## 2021-11-24 PROCEDURE — 99213 OFFICE O/P EST LOW 20 MIN: CPT | Performed by: INTERNAL MEDICINE

## 2021-11-24 RX ORDER — LISINOPRIL AND HYDROCHLOROTHIAZIDE 12.5; 1 MG/1; MG/1
TABLET ORAL
Qty: 90 TABLET | Refills: 1 | Status: SHIPPED
Start: 2021-11-24 | End: 2022-07-21 | Stop reason: SDUPTHER

## 2021-11-24 RX ORDER — FLUTICASONE PROPIONATE 50 MCG
1 SPRAY, SUSPENSION (ML) NASAL DAILY
Qty: 3 EACH | Refills: 1 | Status: SHIPPED
Start: 2021-11-24 | End: 2022-07-21 | Stop reason: SDUPTHER

## 2021-11-25 NOTE — PROGRESS NOTES
[address] [facility] [address] [facility]     21  Joesph Grover : 1970 Sex: male  Age: 46 y.o. Chief Complaint   Patient presents with    Hypertension     4 months, chk b12 & iron       HPI    Patient presents today for 4-month follow-up visit on his medical problems. States she has been feeling well. Blood pressures that he tells me have been in good range. His obstructive sleep apnea stable on his BiPAP. He has had no gouty episodes for some time now. Weight unfortunately is up 2 pounds. BMI is 48. I told him is critical he loses weight. He is status post gastric bypass remotely        Review of Systems     Constitutional:Negative for fatigue, activity change, appetite change, chills, diaphoresis, fever and unexpected weight change.        Morbidly obese   HENT: Negative for congestion, ear pain, hearing loss, postnasal drip, rhinorrhea and sinus pain.    Respiratory: Negative for cough, shortness of breath and wheezing.    Cardiovascular: Negative for chest pain, palpitations .    Gastrointestinal: Negative for abdominal pain, blood in stool, constipation, diarrhea, nausea and vomiting. Endocrine: Negative.    Genitourinary: Negative for difficulty urinating, dysuria, frequency, hematuria and urgency. Musculoskeletal: Negative for arthralgias, back pain, gait problem and myalgias. Skin:    Denies rashes pruritus or skin lesions.   Allergic/Immunologic: Negative for environmental allergies and immunocompromised state. Neurological: Negative for dizziness, weakness, light-headedness, numbness and headaches. Hematological: Formally was anticoagulated for DVT is now finished  Psychiatric/Behavioral: The patient is not nervous/anxious.          Uses BiPAP with success             REST OF PERTINENT ROS GONE OVER AND WAS NEGATIVE.      Covid--          Current Outpatient Medications:     lisinopril-hydroCHLOROthiazide (PRINZIDE;ZESTORETIC) 10-12.5 MG per tablet, 1 PILL BY MOUTH DAILY, Disp: 90 Smokeless tobacco: Current User     Types: Snuff   Vaping Use    Vaping Use: Never used   Substance and Sexual Activity    Alcohol use: Yes     Comment: moderately (1-2 times per month)    Drug use: No    Sexual activity: Yes     Partners: Female   Other Topics Concern    Not on file   Social History Narrative    Not on file     Social Determinants of Health     Financial Resource Strain: Low Risk     Difficulty of Paying Living Expenses: Not hard at all   Food Insecurity: No Food Insecurity    Worried About Running Out of Food in the Last Year: Never true    Kei of Food in the Last Year: Never true   Transportation Needs:     Lack of Transportation (Medical): Not on file    Lack of Transportation (Non-Medical): Not on file   Physical Activity:     Days of Exercise per Week: Not on file    Minutes of Exercise per Session: Not on file   Stress:     Feeling of Stress : Not on file   Social Connections:     Frequency of Communication with Friends and Family: Not on file    Frequency of Social Gatherings with Friends and Family: Not on file    Attends Yazidism Services: Not on file    Active Member of [address] [place] [address] or Organizations: Not on file    Attends Club or Organization Meetings: Not on file    Marital Status: Not on file   Intimate Partner Violence:     Fear of Current or Ex-Partner: Not on file    Emotionally Abused: Not on file    Physically Abused: Not on file    Sexually Abused: Not on file   Housing Stability:     Unable to Pay for Housing in the Last Year: Not on file    Number of [place] in the Last Year: Not on file    Unstable Housing in the Last Year: Not on file       Vitals:    11/24/21 1511   BP: 122/70   Pulse: 72   Temp: 97.8 °F (36.6 °C)   TempSrc: Temporal   SpO2: 95%   Weight: (!) 376 lb 3.2 oz (170.6 kg)   Height: 6' 2\" (1.88 m)       Physical Exam    Const: Appears well developed and well nourished.  Appears morbidly obese. No signs of acute distress  present. Neck: Supple and symmetric. Palpation reveals no adenopathy. No masses appreciated. Thyroid  exhibits no nodule or thyromegaly. No JVD. Carotids: 2+ and equal bilaterally, without bruits. Resp: No rales, rhonchi or wheezes appreciated over the lungs bilaterally. CV: Rate is regular. Rhythm is regular. S1 is normal. S2 is normal. No gallop or rubs. No heart  murmur appreciated. Extremities: Edema, but no clubbing or cyanosis  Abdomen: Bowel sounds are normoactive/obese Palpation reveals softness, with no distension,  organomegaly or tenderness. No abdominal masses palpable/upper abdominal midline hernia. No  palpable hepatosplenomegaly. Musculoskeletal: Trace bilateral lower extremity edema with right greater than left.    Psych: Patient's attitude is cooperative. Patient's affect is appropriate. Judgement is realistic. Insight  is appropriate             Assessment and Plan:  Teresa Martínez was seen today for hypertension. Diagnoses and all orders for this visit:    Obstructive sleep apnea    Essential hypertension  -     lisinopril-hydroCHLOROthiazide (PRINZIDE;ZESTORETIC) 10-12.5 MG per tablet; 1 PILL BY MOUTH DAILY    Hyperlipidemia, unspecified hyperlipidemia type    Vit B12 defic anemia d/t slctv vit B12 malabsorp w protein    Vitamin D deficiency    Class 3 severe obesity due to excess calories with body mass index (BMI) of 45.0 to 49.9 in adult, unspecified whether serious comorbidity present ([facility] [address]    Other orders  -     fluticasone (FLONASE) 50 MCG/ACT nasal spray; 1 spray by Nasal route daily    Plan: I elected not to draw any blood work today waiting for next visit in 4 months. This is to include vitamin D, vitamin B12 , folate and iron,zinc. Future management performed. Weight loss attempts. Continue to monitor blood pressure closely. Notify us with problems in the interim. Return in about 4 months (around 3/24/2022).     Seen By:  Merced Peace MD      *Document was created using voice recognition software. Note was reviewed however may contain grammatical errors.

## 2022-02-16 ENCOUNTER — OFFICE VISIT (OUTPATIENT)
Dept: FAMILY MEDICINE CLINIC | Age: 52
End: 2022-02-16
Payer: MEDICAID

## 2022-02-16 VITALS
DIASTOLIC BLOOD PRESSURE: 88 MMHG | BODY MASS INDEX: 41.75 KG/M2 | TEMPERATURE: 97.3 F | WEIGHT: 315 LBS | SYSTOLIC BLOOD PRESSURE: 140 MMHG | HEIGHT: 73 IN | OXYGEN SATURATION: 96 % | HEART RATE: 71 BPM

## 2022-02-16 DIAGNOSIS — R05.9 COUGH: Primary | ICD-10-CM

## 2022-02-16 PROCEDURE — G8484 FLU IMMUNIZE NO ADMIN: HCPCS | Performed by: FAMILY MEDICINE

## 2022-02-16 PROCEDURE — G8417 CALC BMI ABV UP PARAM F/U: HCPCS | Performed by: FAMILY MEDICINE

## 2022-02-16 PROCEDURE — 3017F COLORECTAL CA SCREEN DOC REV: CPT | Performed by: FAMILY MEDICINE

## 2022-02-16 PROCEDURE — 99213 OFFICE O/P EST LOW 20 MIN: CPT | Performed by: FAMILY MEDICINE

## 2022-02-16 PROCEDURE — G8427 DOCREV CUR MEDS BY ELIG CLIN: HCPCS | Performed by: FAMILY MEDICINE

## 2022-02-16 PROCEDURE — 4004F PT TOBACCO SCREEN RCVD TLK: CPT | Performed by: FAMILY MEDICINE

## 2022-02-16 RX ORDER — AMOXICILLIN AND CLAVULANATE POTASSIUM 875; 125 MG/1; MG/1
1 TABLET, FILM COATED ORAL 2 TIMES DAILY
Qty: 14 TABLET | Refills: 0 | Status: SHIPPED | OUTPATIENT
Start: 2022-02-16 | End: 2022-02-23

## 2022-02-16 NOTE — PROGRESS NOTES
Liban Reed In    [facility] presents to the office today for   Chief Complaint   Patient presents with    Cough    Pharyngitis     Cough  X 2 weeks  COVID test negative at home  No fever  COVID in Sept 2021  No n/v/d  No significant sinus congestion  Hoarse voice    Review of Systems     BP (!) 140/88   Pulse 71   Temp 97.3 °F (36.3 °C) (Temporal)   Ht 6' 1\" (1.854 m)   Wt (!) 386 lb (175.1 kg)   SpO2 96%   BMI 50.93 kg/m²   Physical Exam  Constitutional:       Appearance: Normal appearance. HENT:      Head: Normocephalic and atraumatic. Nose: Congestion present. Mouth/Throat:      Mouth: Mucous membranes are moist.      Pharynx: Posterior oropharyngeal erythema present. Eyes:      Extraocular Movements: Extraocular movements intact. Conjunctiva/sclera: Conjunctivae normal.   Cardiovascular:      Rate and Rhythm: Normal rate. Heart sounds: Normal heart sounds. Pulmonary:      Effort: Pulmonary effort is normal.      Breath sounds: Normal breath sounds. Skin:     General: Skin is warm. Neurological:      Mental Status: He is alert and oriented to person, place, and time.    Psychiatric:         Mood and Affect: Mood normal.         Behavior: Behavior normal.            Current Outpatient Medications:     lisinopril-hydroCHLOROthiazide (PRINZIDE;ZESTORETIC) 10-12.5 MG per tablet, 1 PILL BY MOUTH DAILY, Disp: 90 tablet, Rfl: 1    VITAMIN D PO, Take by mouth, Disp: , Rfl:     vitamin B-12 (CYANOCOBALAMIN) 1000 MCG tablet, Take 1,000 mcg by mouth daily, Disp: , Rfl:     ferrous sulfate 325 (65 Fe) MG tablet, Take 325 mg by mouth daily (with breakfast), Disp: , Rfl:     amoxicillin-clavulanate (AUGMENTIN) 875-125 MG per tablet, Take 1 tablet by mouth 2 times daily for 7 days, Disp: 14 tablet, Rfl: 0    fluticasone (FLONASE) 50 MCG/ACT nasal spray, 1 spray by Nasal route daily, Disp: 3 each, Rfl: 1     Past Medical History:   Diagnosis Date    Ankle pain     Chronic back pain     Chronic knee pain     Colon polyps     Diverticulitis     Dizzy spells     Essential hypertension 9/18/2019    Fatty liver     Foot pain     Gout     occaisional flareups    Hyperlipidemia     Hypertension     Iron deficiency     Obesity     Routine chest x-ray     Unspecified sleep apnea     Vitamin B12 deficiency     Vitamin D deficiency 11/10/2010       Storm Johnson was seen today for cough and pharyngitis. Diagnoses and all orders for this visit:    Cough  -     amoxicillin-clavulanate (AUGMENTIN) 875-125 MG per tablet;  Take 1 tablet by mouth 2 times daily for 7 days       Given time course will trial antibiotics    Kristie Whatley MD

## 2022-03-04 ENCOUNTER — OFFICE VISIT (OUTPATIENT)
Dept: FAMILY MEDICINE CLINIC | Age: 52
End: 2022-03-04
Payer: MEDICAID

## 2022-03-04 VITALS
DIASTOLIC BLOOD PRESSURE: 76 MMHG | OXYGEN SATURATION: 95 % | BODY MASS INDEX: 41.75 KG/M2 | HEIGHT: 73 IN | TEMPERATURE: 97.7 F | HEART RATE: 73 BPM | SYSTOLIC BLOOD PRESSURE: 116 MMHG | WEIGHT: 315 LBS

## 2022-03-04 DIAGNOSIS — M10.9 ACUTE GOUT OF LEFT FOOT, UNSPECIFIED CAUSE: ICD-10-CM

## 2022-03-04 DIAGNOSIS — M79.672 LEFT FOOT PAIN: Primary | ICD-10-CM

## 2022-03-04 PROCEDURE — G8417 CALC BMI ABV UP PARAM F/U: HCPCS | Performed by: INTERNAL MEDICINE

## 2022-03-04 PROCEDURE — 4004F PT TOBACCO SCREEN RCVD TLK: CPT | Performed by: INTERNAL MEDICINE

## 2022-03-04 PROCEDURE — 3017F COLORECTAL CA SCREEN DOC REV: CPT | Performed by: INTERNAL MEDICINE

## 2022-03-04 PROCEDURE — 99213 OFFICE O/P EST LOW 20 MIN: CPT | Performed by: INTERNAL MEDICINE

## 2022-03-04 PROCEDURE — G8484 FLU IMMUNIZE NO ADMIN: HCPCS | Performed by: INTERNAL MEDICINE

## 2022-03-04 PROCEDURE — G8427 DOCREV CUR MEDS BY ELIG CLIN: HCPCS | Performed by: INTERNAL MEDICINE

## 2022-03-04 RX ORDER — COLCHICINE 0.6 MG/1
TABLET ORAL
Qty: 6 TABLET | Refills: 0 | Status: SHIPPED
Start: 2022-03-04 | End: 2022-06-24 | Stop reason: ALTCHOICE

## 2022-03-05 ASSESSMENT — ENCOUNTER SYMPTOMS: COLOR CHANGE: 1

## 2022-03-05 NOTE — PROGRESS NOTES
[facility]     3/5/22  Storm Rios : 1970 Sex: male  Age: 46 y.o. Chief Complaint   Patient presents with    Foot Pain     left foot pain; started wednesday evening; it wasnt bad wednesday and just has progressively gotten worse       HPI  Patient presents to express care today complaining of left lateral foot pain. No history of injury. Started 2 days ago. Progressively worsened. Feels similar to his previous gouty attacks but on the opposite foot. States he typically gets it on the right foot same location laterally denies fever or chills. Minimal redness. Review of Systems   Constitutional: Negative for chills and fever. Musculoskeletal:        See above   Skin: Positive for color change. Neurological: Negative for weakness and numbness. All other systems reviewed and are negative. REST OF PERTINENT ROS GONE OVER AND WAS NEGATIVE. Current Outpatient Medications:     colchicine (COLCRYS) 0.6 MG tablet, Take 2 pill initially then 1 pill 1 hour later.  Can repeat next day if needed, Disp: 6 tablet, Rfl: 0    lisinopril-hydroCHLOROthiazide (PRINZIDE;ZESTORETIC) 10-12.5 MG per tablet, 1 PILL BY MOUTH DAILY, Disp: 90 tablet, Rfl: 1    fluticasone (FLONASE) 50 MCG/ACT nasal spray, 1 spray by Nasal route daily, Disp: 3 each, Rfl: 1    VITAMIN D PO, Take by mouth, Disp: , Rfl:     vitamin B-12 (CYANOCOBALAMIN) 1000 MCG tablet, Take 1,000 mcg by mouth daily, Disp: , Rfl:     ferrous sulfate 325 (65 Fe) MG tablet, Take 325 mg by mouth daily (with breakfast), Disp: , Rfl:   No Known Allergies    Past Medical History:   Diagnosis Date    Ankle pain     Chronic back pain     Chronic knee pain     Colon polyps     Diverticulitis     Dizzy spells     Essential hypertension 2019    Fatty liver     Foot pain     Gout     occaisional flareups    Hyperlipidemia     Hypertension     Iron deficiency     Obesity     Routine chest x-ray  Unspecified sleep apnea     Vitamin B12 deficiency     Vitamin D deficiency 11/10/2010     Past Surgical History:   Procedure Laterality Date    COLONOSCOPY  07/20/2017    COLONOSCOPY N/A 8/5/2019    COLONOSCOPY POLYPECTOMY SNARE/COLD BIOPSY performed by Sekou Blanco MD at [address] [place] [address]    Right knee    LEG SURGERY  1975    Right leg    ABIMAEL-EN-Y GASTRIC BYPASS  09/27/2010    Dr. Nancy Graham  1994/1995/2003    x3     Family History   Problem Relation Age of Onset    High Blood Pressure Father     High Cholesterol Father     Heart Disease Father     Heart Attack Father     Colon Cancer Father     Other Father         Renal failure    Heart Attack Brother     Heart Disease Brother     Cancer Mother         Ovarian    Breast Cancer Mother     Other Sister         Fibromyalgia    Cancer Maternal Uncle         Brain     Social History     Socioeconomic History    Marital status:      Spouse name: Not on file    Number of children: Not on file    Years of education: Not on file    Highest education level: Not on file   Occupational History    Not on file   Tobacco Use    Smoking status: Never Smoker    Smokeless tobacco: Current User     Types: Snuff   Vaping Use    Vaping Use: Never used   Substance and Sexual Activity    Alcohol use: Yes     Comment: moderately (1-2 times per month)    Drug use: No    Sexual activity: Yes     Partners: Female   Other Topics Concern    Not on file   Social History Narrative    Not on file     Social Determinants of Health     Financial Resource Strain: Low Risk     Difficulty of Paying Living Expenses: Not hard at all   Food Insecurity: No Food Insecurity    Worried About Running Out of Food in the Last Year: Never true    Kei of Food in the Last Year: Never true   Transportation Needs:     Lack of Transportation (Medical): Not on file    Lack of Transportation (Non-Medical):  Not on file   Physical Activity:     Days of Exercise per Week: Not on file    Minutes of Exercise per Session: Not on file   Stress:     Feeling of Stress : Not on file   Social Connections:     Frequency of Communication with Friends and Family: Not on file    Frequency of Social Gatherings with Friends and Family: Not on file    Attends Amish Services: Not on file    Active Member of [address] [place] [address] or Organizations: Not on file    Attends Club or Organization Meetings: Not on file    Marital Status: Not on file   Intimate Partner Violence:     Fear of Current or Ex-Partner: Not on file    Emotionally Abused: Not on file    Physically Abused: Not on file    Sexually Abused: Not on file   Housing Stability:     Unable to Pay for Housing in the Last Year: Not on file    Number of [place] in the Last Year: Not on file    Unstable Housing in the Last Year: Not on file       Vitals:    03/04/22 0800   BP: 116/76   Pulse: 73   Temp: 97.7 °F (36.5 °C)   TempSrc: Temporal   SpO2: 95%   Weight: (!) 386 lb (175.1 kg)   Height: 6' 1\" (1.854 m)       Physical Exam  Vitals and nursing note reviewed. Constitutional:       Appearance: He is not toxic-appearing. Musculoskeletal:         General: Tenderness present. No swelling, deformity or signs of injury. Normal range of motion. Comments: He does have tenderness to palpation and slight erythema around the fifth MTP joint area. Skin:     General: Skin is warm and dry. Findings: Erythema present. Neurological:      General: No focal deficit present. Mental Status: He is alert and oriented to person, place, and time. Sensory: No sensory deficit. Motor: No weakness. Psychiatric:         Mood and Affect: Mood normal.         Behavior: Behavior normal.         Thought Content: Thought content normal.         Judgment: Judgment normal.                 Assessment and Plan:  Kerry Oviedo was seen today for foot pain.     Diagnoses and all orders for this visit:    Left foot pain    Acute gout of left foot, unspecified cause    Other orders  -     colchicine (COLCRYS) 0.6 MG tablet; Take 2 pill initially then 1 pill 1 hour later. Can repeat next day if needed    Plan: Colchicine protocol. Warned of potential side effects. Notify us if not improving. Keep upcoming follow-up appointment and review last note when I see him next. Return for keep appt. Seen By:  Livan Rodriguez MD      *Document was created using voice recognition software. Note was reviewed however may contain grammatical errors.

## 2022-03-18 ENCOUNTER — OFFICE VISIT (OUTPATIENT)
Dept: FAMILY MEDICINE CLINIC | Age: 52
End: 2022-03-18
Payer: MEDICAID

## 2022-03-18 ENCOUNTER — TELEPHONE (OUTPATIENT)
Dept: FAMILY MEDICINE CLINIC | Age: 52
End: 2022-03-18

## 2022-03-18 VITALS
TEMPERATURE: 98 F | SYSTOLIC BLOOD PRESSURE: 140 MMHG | WEIGHT: 315 LBS | HEART RATE: 71 BPM | DIASTOLIC BLOOD PRESSURE: 80 MMHG | OXYGEN SATURATION: 95 % | BODY MASS INDEX: 51.06 KG/M2

## 2022-03-18 DIAGNOSIS — E61.1 IRON DEFICIENCY: ICD-10-CM

## 2022-03-18 DIAGNOSIS — Z12.5 SCREENING FOR MALIGNANT NEOPLASM OF PROSTATE: ICD-10-CM

## 2022-03-18 DIAGNOSIS — E51.9 THIAMIN DEFICIENCY: ICD-10-CM

## 2022-03-18 DIAGNOSIS — E55.9 VITAMIN D DEFICIENCY: ICD-10-CM

## 2022-03-18 DIAGNOSIS — G47.33 OBSTRUCTIVE SLEEP APNEA: ICD-10-CM

## 2022-03-18 DIAGNOSIS — E66.01 CLASS 3 SEVERE OBESITY DUE TO EXCESS CALORIES WITH BODY MASS INDEX (BMI) OF 45.0 TO 49.9 IN ADULT, UNSPECIFIED WHETHER SERIOUS COMORBIDITY PRESENT (HCC): ICD-10-CM

## 2022-03-18 DIAGNOSIS — D51.1 VIT B12 DEFIC ANEMIA D/T SLCTV VIT B12 MALABSORP W PROTEIN: ICD-10-CM

## 2022-03-18 DIAGNOSIS — I10 ESSENTIAL HYPERTENSION: ICD-10-CM

## 2022-03-18 DIAGNOSIS — E78.5 HYPERLIPIDEMIA, UNSPECIFIED HYPERLIPIDEMIA TYPE: ICD-10-CM

## 2022-03-18 DIAGNOSIS — Z98.84 GASTRIC BYPASS STATUS FOR OBESITY: ICD-10-CM

## 2022-03-18 DIAGNOSIS — I10 ESSENTIAL HYPERTENSION: Primary | ICD-10-CM

## 2022-03-18 LAB
ALBUMIN SERPL-MCNC: 3.9 G/DL (ref 3.5–5.2)
ALP BLD-CCNC: 62 U/L (ref 40–129)
ALT SERPL-CCNC: 16 U/L (ref 0–40)
ANION GAP SERPL CALCULATED.3IONS-SCNC: 13 MMOL/L (ref 7–16)
AST SERPL-CCNC: 24 U/L (ref 0–39)
BASOPHILS ABSOLUTE: 0.04 E9/L (ref 0–0.2)
BASOPHILS RELATIVE PERCENT: 0.7 % (ref 0–2)
BILIRUB SERPL-MCNC: 0.6 MG/DL (ref 0–1.2)
BUN BLDV-MCNC: 10 MG/DL (ref 6–20)
CALCIUM SERPL-MCNC: 8.8 MG/DL (ref 8.6–10.2)
CHLORIDE BLD-SCNC: 107 MMOL/L (ref 98–107)
CHOLESTEROL, TOTAL: 132 MG/DL (ref 0–199)
CO2: 24 MMOL/L (ref 22–29)
CREAT SERPL-MCNC: 1.1 MG/DL (ref 0.7–1.2)
EOSINOPHILS ABSOLUTE: 0.18 E9/L (ref 0.05–0.5)
EOSINOPHILS RELATIVE PERCENT: 3.1 % (ref 0–6)
FERRITIN: 183 NG/ML
FOLATE: 15 NG/ML (ref 4.8–24.2)
GFR AFRICAN AMERICAN: >60
GFR NON-AFRICAN AMERICAN: >60 ML/MIN/1.73
GLUCOSE BLD-MCNC: 111 MG/DL (ref 74–99)
HCT VFR BLD CALC: 52 % (ref 37–54)
HDLC SERPL-MCNC: 32 MG/DL
HEMOGLOBIN: 16.7 G/DL (ref 12.5–16.5)
IMMATURE GRANULOCYTES #: 0.03 E9/L
IMMATURE GRANULOCYTES %: 0.5 % (ref 0–5)
IRON SATURATION: 37 % (ref 20–55)
IRON: 104 MCG/DL (ref 59–158)
LDL CHOLESTEROL CALCULATED: 75 MG/DL (ref 0–99)
LYMPHOCYTES ABSOLUTE: 1.36 E9/L (ref 1.5–4)
LYMPHOCYTES RELATIVE PERCENT: 23.1 % (ref 20–42)
MCH RBC QN AUTO: 29.3 PG (ref 26–35)
MCHC RBC AUTO-ENTMCNC: 32.1 % (ref 32–34.5)
MCV RBC AUTO: 91.2 FL (ref 80–99.9)
MONOCYTES ABSOLUTE: 0.56 E9/L (ref 0.1–0.95)
MONOCYTES RELATIVE PERCENT: 9.5 % (ref 2–12)
NEUTROPHILS ABSOLUTE: 3.72 E9/L (ref 1.8–7.3)
NEUTROPHILS RELATIVE PERCENT: 63.1 % (ref 43–80)
PDW BLD-RTO: 13.2 FL (ref 11.5–15)
PLATELET # BLD: 185 E9/L (ref 130–450)
PMV BLD AUTO: 9.8 FL (ref 7–12)
POTASSIUM SERPL-SCNC: 4.4 MMOL/L (ref 3.5–5)
PROSTATE SPECIFIC ANTIGEN: 0.81 NG/ML (ref 0–4)
RBC # BLD: 5.7 E12/L (ref 3.8–5.8)
SODIUM BLD-SCNC: 144 MMOL/L (ref 132–146)
TOTAL IRON BINDING CAPACITY: 278 MCG/DL (ref 250–450)
TOTAL PROTEIN: 6.9 G/DL (ref 6.4–8.3)
TRIGL SERPL-MCNC: 127 MG/DL (ref 0–149)
VITAMIN B-12: 322 PG/ML (ref 211–946)
VITAMIN D 25-HYDROXY: 31 NG/ML (ref 30–100)
VLDLC SERPL CALC-MCNC: 25 MG/DL
WBC # BLD: 5.9 E9/L (ref 4.5–11.5)

## 2022-03-18 PROCEDURE — 3017F COLORECTAL CA SCREEN DOC REV: CPT | Performed by: INTERNAL MEDICINE

## 2022-03-18 PROCEDURE — G8484 FLU IMMUNIZE NO ADMIN: HCPCS | Performed by: INTERNAL MEDICINE

## 2022-03-18 PROCEDURE — G8417 CALC BMI ABV UP PARAM F/U: HCPCS | Performed by: INTERNAL MEDICINE

## 2022-03-18 PROCEDURE — G8427 DOCREV CUR MEDS BY ELIG CLIN: HCPCS | Performed by: INTERNAL MEDICINE

## 2022-03-18 PROCEDURE — 4004F PT TOBACCO SCREEN RCVD TLK: CPT | Performed by: INTERNAL MEDICINE

## 2022-03-18 PROCEDURE — 99213 OFFICE O/P EST LOW 20 MIN: CPT | Performed by: INTERNAL MEDICINE

## 2022-03-19 NOTE — TELEPHONE ENCOUNTER
Vitamin B12 and vitamin D. Lower limits of normal.  Make sure he is taking them daily. Please put in note field to recheck them again at next visit. Rest of labs okay.

## 2022-03-20 ENCOUNTER — TELEPHONE (OUTPATIENT)
Dept: FAMILY MEDICINE CLINIC | Age: 52
End: 2022-03-20

## 2022-03-20 NOTE — PROGRESS NOTES
[address] [place]     3/20/22  Anel Trevino : 1970 Sex: male  Age: 46 y.o. Chief Complaint   Patient presents with    Other     4 months       HPI  Patient presents today for 4-month follow-up visit on his medical problems. Reviewed last couple notes. Was recently in for additional visit for gouty episode which did clear with colchicine. This was the first episode in some time. Patient tells me blood pressures been good on current antihypertensive regimen. Sleep apnea is stable on his BiPAP. Patient's weight unfortunately is up 11 pounds. Patient is status post gastric bypass remotely. Review of Systems     Constitutional:Negative for fatigue, activity change, appetite change, chills, diaphoresis, fever and unexpected weight change.        Morbidly obese   HENT: Negative for congestion, ear pain, hearing loss, postnasal drip, rhinorrhea and sinus pain.    Respiratory: Negative for cough, shortness of breath and wheezing.    Cardiovascular: Negative for chest pain, palpitations .    Gastrointestinal: Negative for abdominal pain, blood in stool, constipation, diarrhea, nausea and vomiting. Endocrine: Negative.    Genitourinary: Negative for difficulty urinating, dysuria, frequency, hematuria and urgency. Musculoskeletal: Negative for arthralgias, back pain, gait problem and myalgias.    Skin:    Denies rashes pruritus or skin lesions.   Allergic/Immunologic: Negative for environmental allergies and immunocompromised state. Neurological: Negative for dizziness, weakness, light-headedness, numbness and headaches. Hematological: Formally was anticoagulated for DVT is now finished  Psychiatric/Behavioral: The patient is not nervous/anxious.          Uses BiPAP with success            REST OF PERTINENT ROS GONE OVER AND WAS NEGATIVE.      PMH:  Health Maintenance:  Colonoscopy - (2017), -  EGD - 8/10,-  2D ECHO - 8/10  EKG -   Psa Test -   Medical Problems:  Obesity, 3 abd hernias repaired, right knee arthroplasty, Rih  Sleep Apnea - with bipap  gastric bypass -   Vitamin B12 deficiency, Iron deficiency  Type 2 Diabetes - Prior to gastric bypass  Gout, Colon Polyps, Fatty Liver, Diverticulosi  Pulmonary nodules-followed and stable  Covid-19-     FH: Mother's side,\" a lot of cancer\". Father:  Heart Disease  MI - 1st age 40  Colon Cancer  Renal failure - Dialysis- age 66. Mother:  Ovarian Cancer, Breast Cancer. Brother 1:  Heart Disease  MI - age 54. Brother 2:  None. Sister 1:  Fibromyalgia. Maternal Uncle 1 :  brain cancer.   Uncle 1 :  brain cancer. SH:  . 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 Alcohol: Current Alcohol  Use - Admits to 12 pack per week.                  Current Outpatient Medications:     lisinopril-hydroCHLOROthiazide (PRINZIDE;ZESTORETIC) 10-12.5 MG per tablet, 1 PILL BY MOUTH DAILY, Disp: 90 tablet, Rfl: 1    fluticasone (FLONASE) 50 MCG/ACT nasal spray, 1 spray by Nasal route daily, Disp: 3 each, Rfl: 1    VITAMIN D PO, Take by mouth, Disp: , Rfl:     vitamin B-12 (CYANOCOBALAMIN) 1000 MCG tablet, Take 1,000 mcg by mouth daily, Disp: , Rfl:     ferrous sulfate 325 (65 Fe) MG tablet, Take 325 mg by mouth daily (with breakfast), Disp: , Rfl:     colchicine (COLCRYS) 0.6 MG tablet, Take 2 pill initially then 1 pill 1 hour later.  Can repeat next day if needed (Patient not taking: Reported on 3/18/2022), Disp: 6 tablet, Rfl: 0  No Known Allergies    Past Medical History:   Diagnosis Date    Ankle pain     Chronic back pain     Chronic knee pain     Colon polyps     Diverticulitis     Dizzy spells     Essential hypertension 2019    Fatty liver     Foot pain     Gout     occaisional flareups    Hyperlipidemia     Hypertension     Iron deficiency     Obesity     Routine chest x-ray     Unspecified sleep apnea     Vitamin B12 deficiency     Vitamin D deficiency 11/10/2010     Past Surgical History:   Procedure Laterality Date    COLONOSCOPY  07/20/2017    COLONOSCOPY N/A 8/5/2019    COLONOSCOPY POLYPECTOMY SNARE/COLD BIOPSY performed by Tana Velasco MD at [facility]    Right knee    LEG SURGERY  1975    Right leg    ABIMAEL-EN-Y GASTRIC BYPASS  09/27/2010    Dr. Nilay Wallace  1994/1995/2003    x3     Family History   Problem Relation Age of Onset    High Blood Pressure Father     High Cholesterol Father     Heart Disease Father     Heart Attack Father     Colon Cancer Father     Other Father         Renal failure    Heart Attack Brother     Heart Disease Brother     Cancer Mother         Ovarian    Breast Cancer Mother     Other Sister         Fibromyalgia    Cancer Maternal Uncle         Brain     Social History     Socioeconomic History    Marital status:      Spouse name: Not on file    Number of children: Not on file    Years of education: Not on file    Highest education level: Not on file   Occupational History    Not on file   Tobacco Use    Smoking status: Never Smoker    Smokeless tobacco: Current User     Types: Snuff   Vaping Use    Vaping Use: Never used   Substance and Sexual Activity    Alcohol use: Yes     Comment: moderately (1-2 times per month)    Drug use: No    Sexual activity: Yes     Partners: Female   Other Topics Concern    Not on file   Social History Narrative    Not on file     Social Determinants of Health     Financial Resource Strain: Low Risk     Difficulty of Paying Living Expenses: Not hard at all   Food Insecurity: No Food Insecurity    Worried About Running Out of Food in the Last Year: Never true    Kei of Food in the Last Year: Never true   Transportation Needs:     Lack of Transportation (Medical): Not on file    Lack of Transportation (Non-Medical):  Not on file   Physical Activity:     Days of Exercise per Week: Not on file    Minutes of Exercise per Session: Not on file   Stress:     Feeling of Stress : Not on file   Social Connections:     Frequency of Communication with Friends and Family: Not on file    Frequency of Social Gatherings with Friends and Family: Not on file    Attends Shinto Services: Not on file    Active Member of [address] [place] [address] [employer] or Organizations: Not on file    Attends Club or Organization Meetings: Not on file    Marital Status: Not on file   Intimate Partner Violence:     Fear of Current or Ex-Partner: Not on file    Emotionally Abused: Not on file    Physically Abused: Not on file    Sexually Abused: Not on file   Housing Stability:     Unable to Pay for Housing in the Last Year: Not on file    Number of [place] in the Last Year: Not on file    Unstable Housing in the Last Year: Not on file       Vitals:    03/18/22 0653   BP: (!) 140/80   Pulse: 71   Temp: 98 °F (36.7 °C)   SpO2: 95%   Weight: (!) 387 lb (175.5 kg)       Physical Exam    Const: Appears well developed and well nourished. Appears morbidly obese. No signs of acute distress  present. Neck: Supple and symmetric. Palpation reveals no adenopathy. No masses appreciated. Thyroid  exhibits no nodule or thyromegaly. No JVD. Carotids: 2+ and equal bilaterally, without bruits. Resp: No rales, rhonchi or wheezes appreciated over the lungs bilaterally. CV: Rate is regular. Rhythm is regular. S1 is normal. S2 is normal. No gallop or rubs. No heart  murmur appreciated. Extremities: Edema, but no clubbing or cyanosis  Abdomen: Bowel sounds are normoactive/obese Palpation reveals softness, with no distension,  organomegaly or tenderness. No abdominal masses palpable/upper abdominal midline hernia. No  palpable hepatosplenomegaly. Musculoskeletal: Trace bilateral lower extremity edema with right greater than left.    Psych: Patient's attitude is cooperative. Patient's affect is appropriate. Judgement is realistic. Insight  is appropriate           Assessment and Plan:  Tracie Givens was seen today for other. Diagnoses and all orders for this visit:    Essential hypertension  -     Comprehensive Metabolic Panel; Future  -     CBC with Auto Differential; Future  -     ZINC; Future    Hyperlipidemia, unspecified hyperlipidemia type  -     Lipid Panel; Future    Vit B12 defic anemia d/t slctv vit B12 malabsorp w protein  -     Vitamin B12 & Folate; Future    Vitamin D deficiency  -     Vitamin D 25 Hydroxy; Future    Obstructive sleep apnea    Class 3 severe obesity due to excess calories with body mass index (BMI) of 45.0 to 49.9 in adult, unspecified whether serious comorbidity present ([facility] [address]    Gastric bypass status for obesity    Iron deficiency  -     Iron and TIBC; Future  -     Ferritin; Future    Thiamin deficiency  -     VITAMIN B1; Future    Screening for malignant neoplasm of prostate  -     PSA Screening; Future    Plan: See him back in 4 months and as needed. He is due for colonoscopy this year also wants to buy the looks of things for EGD in 2020. We will have the girls notify him to bring this up to his endoscopist also. Blood work today to monitor disease progression medication use. Continue to monitor blood pressure closely. Must lose weight. Notify us of problems in the interim. Return in about 4 months (around 7/18/2022). Seen By:  Jasper Elias MD      *Document was created using voice recognition software. Note was reviewed however may contain grammatical errors.

## 2022-03-20 NOTE — TELEPHONE ENCOUNTER
Patient is due for colonoscopy this year which she is aware of. In looking through the chart it looks like he was due for EGD in 2020. He should address this with his endoscopist when he sees him this year.   I do not have the 2017 report from his last endoscopy

## 2022-03-24 ENCOUNTER — TELEPHONE (OUTPATIENT)
Dept: FAMILY MEDICINE CLINIC | Age: 52
End: 2022-03-24

## 2022-03-24 LAB — ZINC: 83.1 UG/DL (ref 60–120)

## 2022-03-26 LAB — VITAMIN B1 WHOLE BLOOD: 102 NMOL/L (ref 70–180)

## 2022-05-23 ENCOUNTER — TELEPHONE (OUTPATIENT)
Dept: FAMILY MEDICINE CLINIC | Age: 52
End: 2022-05-23

## 2022-06-24 ENCOUNTER — TELEPHONE (OUTPATIENT)
Dept: SURGERY | Age: 52
End: 2022-06-24

## 2022-06-24 ENCOUNTER — OFFICE VISIT (OUTPATIENT)
Dept: SURGERY | Age: 52
End: 2022-06-24
Payer: MEDICAID

## 2022-06-24 VITALS
SYSTOLIC BLOOD PRESSURE: 141 MMHG | BODY MASS INDEX: 41.75 KG/M2 | WEIGHT: 315 LBS | HEART RATE: 73 BPM | RESPIRATION RATE: 18 BRPM | HEIGHT: 73 IN | DIASTOLIC BLOOD PRESSURE: 74 MMHG | TEMPERATURE: 97.8 F

## 2022-06-24 DIAGNOSIS — K43.9 EPIGASTRIC HERNIA: Primary | ICD-10-CM

## 2022-06-24 DIAGNOSIS — D12.5 ADENOMATOUS POLYP OF SIGMOID COLON: ICD-10-CM

## 2022-06-24 PROCEDURE — 4004F PT TOBACCO SCREEN RCVD TLK: CPT | Performed by: SURGERY

## 2022-06-24 PROCEDURE — 3017F COLORECTAL CA SCREEN DOC REV: CPT | Performed by: SURGERY

## 2022-06-24 PROCEDURE — G8427 DOCREV CUR MEDS BY ELIG CLIN: HCPCS | Performed by: SURGERY

## 2022-06-24 PROCEDURE — 99213 OFFICE O/P EST LOW 20 MIN: CPT | Performed by: SURGERY

## 2022-06-24 PROCEDURE — G8417 CALC BMI ABV UP PARAM F/U: HCPCS | Performed by: SURGERY

## 2022-06-24 RX ORDER — SODIUM, POTASSIUM,MAG SULFATES 17.5-3.13G
1 SOLUTION, RECONSTITUTED, ORAL ORAL 2 TIMES DAILY
Qty: 1 EACH | Refills: 0 | Status: SHIPPED | OUTPATIENT
Start: 2022-06-24 | End: 2022-06-25

## 2022-06-24 NOTE — PATIENT INSTRUCTIONS
If you have any questions, please call Renetta at [address]    It is very important that you follow all of the instructions listed on this sheet carefully (they may be slightly different than the directions on the product that you purchase at the pharmacy) to ensure that your colon is adequately cleaned out or your risk of complications could be increased. 2 Days or More Before Endoscopy:   Obtain SUPREP from the pharmacy.  Do not eat corn, tomatoes, peas or watermelon 3 to 5 days before procedure. · Two days before the colonoscopy, fill both of the 16 oz bottles in the [facility] with water and put in the refrigerator to get cold  · Start clear liquid diet 2 days before the procedure   If you are on INSULIN or OTHER DIABETIC MEDICATIONS, then check with your primary care physician as to how to adjust your medication while on clear liquid diet and when nothing by mouth. 1 Day Before the Endoscopy:   No solid food  only clear liquids (soup, jello, or juice that you can see through with no solid food) for breakfast, lunch and supper. DO NOT drink or eat anything that is red as it will turn the inside of the colon red and look like blood.  Have at least 8 oz or more of clear liquids for breakfast (7 am to 8 am) and lunch (11:30 am to 12:30 pm).  1:00 pm Drink at least 8 oz of clear liquids.  3:00 pm Drink at least 8 oz of clear liquids.  4:00 pm Drink one of the 16 oz bottles of the SUPREP followed immediately by at least 8 oz of clear liquids.  5:00 pm Drink at least 8 oz of clear liquids.  Can continue to take  clear liquids    Day of Endoscopy:   4 hours prior to scheduled time for colonoscopy, drink the second bottle of SUPREP followed immediately by at least 8 oz of clear liquids.   STOP ALL CLEAR LIQUIDS 2 HOURS PRIOR TO SCHEDULED TIME   If any blood pressure medications or heart medications are due in the morning, you should take them with a sip of water. Instructions for Clear liquid diet  Definition  A clear liquid diet consists of clear liquids, such as water, broth and plain gelatin, that are easily digested and leave no undigested residue in your intestinal tract. Your doctor may prescribe a clear liquid diet before certain medical procedures or if you have certain digestive problems. Because a clear liquid diet can't provide you with adequate calories and nutrients, it shouldn't be continued for more than a few days. Purpose  A clear liquid diet is often used before tests, procedures or surgeries that require no food in your stomach or intestines, such as before colonoscopy. It may also be recommended as a short-term diet if you have certain digestive problems, such as nausea, vomiting or diarrhea, or after certain types of surgery. Diet details  A clear liquid diet helps maintain adequate hydration, provides some important electrolytes, such as sodium and potassium, and gives some energy at a time when a full diet isn't possible or recommended. The following foods are allowed in a clear liquid diet:    Plain water    Fruit juices without pulp, such as apple juice, grape juice or cranberry juice    Strained lemonade or fruit punch    Clear, fat-free broth (bouillon or consomme)    Clear sodas    Plain gelatin    Honey    Ice pops without bits of fruit or fruit pulp    Tea or coffee without milk or cream    Any foods not on the above list should be avoided. Also, for certain tests, such as colon exams, your doctor may ask you to avoid liquids or gelatin with red coloring.      A typical menu on the clear liquid diet may look like this:     Breakfast:  1 glass fruit juice  1 cup coffee or tea (without dairy products)  1 cup broth  1 bowl gelatin     Snack:  1 glass fruit juice  1 bowl gelatin     Lunch:  1 glass fruit juice  1 glass water  1 cup broth  1 bowl gelatin     Snack:  1 ice pop (without fruit pulp)  1 cup coffee or tea (without dairy products) or a soft drink     Dinner:  1 cup juice or water  1 cup broth  1 bowl gelatin  1 cup coffee or tea     Results  Although the clear liquid diet may not be very exciting, it does fulfill its purpose. It's designed to keep your stomach and intestines clear, limit strain to your digestive system, but keep your body hydrated as you prepare for or recover from a medical procedure. Risks  Because a clear liquid diet can't provide you with adequate calories and nutrients, it shouldn't be used for more than a few days. Only use the clear liquid diet as directed by your doctor. If your doctor prescribes a clear liquid diet before a medical test, be sure to follow the diet instructions exactly. If you don't follow the diet exactly, you risk an inaccurate test and may have to reschedule the procedure for another time. The importance of proper hydration  A colonoscopy prep causes the body to lose a significant amount of fluid and can result in sickness due to dehydration. It's important that you prepare your body by drinking extra clear liquids before the prep. Stay hydrated by drinking all required clear liquids during the prep. Replenish your system by drinking clear liquids after returning home from your colonoscopy.

## 2022-06-24 NOTE — PROGRESS NOTES
[facility]  HISTORY & PHYSICAL      CC:  Colorectal cancer screening         HPI:     Cheyenne Solano is here as  Established pt. The patient was sent here to discuss colorectal cancer screening. The patient denies any weight loss, rectal bleeding, abdominal pain, or change in bowel habits. There is no family history of IBD or colorectal cancer. The patient had a colonoscopy 3 years ago and 2 tubular adenomas were removed. Pt is due this year. Pt  Also has a hernia that is not causing pain.      Past Medical History:   Diagnosis Date    Ankle pain     Chronic back pain     Chronic knee pain     Colon polyps     Diverticulitis     Dizzy spells     Essential hypertension 9/18/2019    Fatty liver     Foot pain     Gout     occaisional flareups    Hyperlipidemia     Hypertension     Iron deficiency     Obesity     Routine chest x-ray     Unspecified sleep apnea     Vitamin B12 deficiency     Vitamin D deficiency 11/10/2010     Past Surgical History:   Procedure Laterality Date    COLONOSCOPY  07/20/2017    COLONOSCOPY N/A 8/5/2019    COLONOSCOPY POLYPECTOMY SNARE/COLD BIOPSY performed by Patience Ross MD at [place] [address]. ARTHROSCOPY  1985    Right knee    LEG SURGERY  1975    Right leg    ABIMAEL-EN-Y GASTRIC BYPASS  09/27/2010    Dr. Alex Tubbs  1994/1995/2003    x3     Social History     Socioeconomic History    Marital status:      Spouse name: Not on file    Number of children: Not on file    Years of education: Not on file    Highest education level: Not on file   Occupational History    Not on file   Tobacco Use    Smoking status: Never Smoker    Smokeless tobacco: Current User     Types: Snuff   Vaping Use    Vaping Use: Never used   Substance and Sexual Activity    Alcohol use: Yes     Comment: moderately (1-2 times per month)    Drug use: No    Sexual activity: Yes     Partners: Female   Other Topics Concern    Not on file   Social History Narrative    Not on file     Social Determinants of Health     Financial Resource Strain: Low Risk     Difficulty of Paying Living Expenses: Not hard at all   Food Insecurity: No Food Insecurity    Worried About Running Out of Food in the Last Year: Never true    [address] in the Last Year: Never true   Transportation Needs:     Lack of Transportation (Medical): Not on file    Lack of Transportation (Non-Medical): Not on file   Physical Activity:     Days of Exercise per Week: Not on file    Minutes of Exercise per Session: Not on file   Stress:     Feeling of Stress : Not on file   Social Connections:     Frequency of Communication with Friends and Family: Not on file    Frequency of Social Gatherings with Friends and Family: Not on file    Attends [employer] Services: Not on file    Active Member of [address] [place] [address] or Organizations: Not on file    Attends Club or Organization Meetings: Not on file    Marital Status: Not on file   Intimate Partner Violence:     Fear of Current or Ex-Partner: Not on file    Emotionally Abused: Not on file    Physically Abused: Not on file    Sexually Abused: Not on file   Housing Stability:     Unable to Pay for Housing in the Last Year: Not on file    Number of [place] in the Last Year: Not on file    Unstable Housing in the Last Year: Not on file     No Known Allergies     I have reviewed and confirmed the past medical history, surgical history, social history, allergies in the chart. Medications: I have reviewed the medication list in the chart.     Review of Systems:  Review of Systems - History obtained from the patient  General ROS: negative  Psychological ROS: negative  Ophthalmic ROS: negative for - blurry vision, double vision or loss of vision  ENT ROS: negative for - epistaxis, sore throat, vocal changes or malocclusion  Respiratory ROS: negative for - pleuritic pain, shortness of breath or tachypnea  Cardiovascular ROS: negative for - chest pain or palpitations  Gastrointestinal ROS: negative for - abdominal pain or gas/bloating  Genito-Urinary ROS: negative for - hematuria or pelvic pain  Endocrine ROS: negative   Heme ROS: negative   Musculoskeletal ROS: negative  Neurological ROS: negative for - confusion, dizziness, headaches, numbness/tingling, seizures or weakness    Physical Exam   BP (!) 141/74   Pulse 73   Temp 97.8 °F (36.6 °C) (Infrared)   Resp 18   Ht 6' 1\" (1.854 m)   Wt (!) 388 lb (176 kg)   BMI 51.19 kg/m²   Vitals:    06/24/22 1005   BP: (!) 141/74   Pulse: 73   Resp: 18   Temp: 97.8 °F (36.6 °C)       PSYCH: mood and affect normal, alert and oriented x 3  CONSTITUTIONAL: No apparent distress, comfortable  EYES: Sclera white, pupils equal round and reactive to light  ENMT:  Hearing normal, trachea midline, ears externally intact  LYMPH: no lympadenopathy in neck. No lympadenopathy in groins  RESP: Breath sounds were clear and equal with no rales, wheezes, or rhonchi. Respiratory effort was normal with no retractions or use of accessory muscles. CV: Heart sounds were normal with a regular rate and rhythm. No pedal edema  GI/ Abdomen: The abdomen was soft and non distended. There was no tenderness, guarding, rebound, or rigidity. There was no                     masses, hepatosplenomegaly. Large epigastric hernia reducible  Rectal -Deferred  MSK: no clubbing/ no cyanosis/ gait normal       Assessment:    1. high risk for colorectal cancer / hx of polyps  2. Large epigastric hernia    Plan:  1. High risk Screening colonoscopy  I discussed the options for colorectal cancer screening with the patient including options of fecal occult blood testing with flexible sigmoidoscopy or air contrast barium enema. I also discussed the risks and benefits of colonoscopy with possible biopsy/polypectomy/cauterization. I recommended colonoscopy with deep sedation.   The patient understands the risks of bleeding and perforation (<0.1%) and agrees to proceed. 2 days of clear liquids prior to procedure  suprep split dose prep    Schedule at [facility] after august 5    2.  Epigastric hernia--reducible--asymptomatic--continue to monitor        Sae Stanley MD, FACS  6/24/2022  10:41 AM

## 2022-06-24 NOTE — PROGRESS NOTES
MA submitted surgery scheduling request in [facility] . MA Scheduled pt for Colonoscopy on 08/29/2022 at 9:00am. Pt needs to arrive at [address] [place] [address] at 8:00am. Patient confirmed date and time. Address and directions given.      Electronically signed by Bruna Baumgarten, MA on 6/24/22 at 11:30 AM EDT

## 2022-07-21 ENCOUNTER — TELEPHONE (OUTPATIENT)
Dept: SURGERY | Age: 52
End: 2022-07-21

## 2022-07-21 ENCOUNTER — OFFICE VISIT (OUTPATIENT)
Dept: FAMILY MEDICINE CLINIC | Age: 52
End: 2022-07-21
Payer: MEDICAID

## 2022-07-21 VITALS
OXYGEN SATURATION: 98 % | BODY MASS INDEX: 41.75 KG/M2 | WEIGHT: 315 LBS | HEART RATE: 71 BPM | TEMPERATURE: 96.9 F | HEIGHT: 73 IN | SYSTOLIC BLOOD PRESSURE: 118 MMHG | DIASTOLIC BLOOD PRESSURE: 68 MMHG

## 2022-07-21 DIAGNOSIS — G47.33 OBSTRUCTIVE SLEEP APNEA: ICD-10-CM

## 2022-07-21 DIAGNOSIS — E66.01 CLASS 3 SEVERE OBESITY DUE TO EXCESS CALORIES WITHOUT SERIOUS COMORBIDITY WITH BODY MASS INDEX (BMI) OF 50.0 TO 59.9 IN ADULT (HCC): ICD-10-CM

## 2022-07-21 DIAGNOSIS — D51.1 VIT B12 DEFIC ANEMIA D/T SLCTV VIT B12 MALABSORP W PROTEIN: ICD-10-CM

## 2022-07-21 DIAGNOSIS — E55.9 VITAMIN D DEFICIENCY: ICD-10-CM

## 2022-07-21 DIAGNOSIS — I10 ESSENTIAL HYPERTENSION: ICD-10-CM

## 2022-07-21 PROCEDURE — G8417 CALC BMI ABV UP PARAM F/U: HCPCS | Performed by: INTERNAL MEDICINE

## 2022-07-21 PROCEDURE — 3017F COLORECTAL CA SCREEN DOC REV: CPT | Performed by: INTERNAL MEDICINE

## 2022-07-21 PROCEDURE — 99213 OFFICE O/P EST LOW 20 MIN: CPT | Performed by: INTERNAL MEDICINE

## 2022-07-21 PROCEDURE — G8427 DOCREV CUR MEDS BY ELIG CLIN: HCPCS | Performed by: INTERNAL MEDICINE

## 2022-07-21 PROCEDURE — 4004F PT TOBACCO SCREEN RCVD TLK: CPT | Performed by: INTERNAL MEDICINE

## 2022-07-21 RX ORDER — LISINOPRIL AND HYDROCHLOROTHIAZIDE 12.5; 1 MG/1; MG/1
TABLET ORAL
Qty: 90 TABLET | Refills: 1 | Status: SHIPPED | OUTPATIENT
Start: 2022-07-21

## 2022-07-21 RX ORDER — POLYETHYLENE GLYCOL 3350, SODIUM SULFATE ANHYDROUS, SODIUM BICARBONATE, SODIUM CHLORIDE, POTASSIUM CHLORIDE 236; 22.74; 6.74; 5.86; 2.97 G/4L; G/4L; G/4L; G/4L; G/4L
4 POWDER, FOR SOLUTION ORAL ONCE
Qty: 4000 ML | Refills: 0 | Status: SHIPPED | OUTPATIENT
Start: 2022-07-21 | End: 2022-07-21

## 2022-07-21 RX ORDER — FLUTICASONE PROPIONATE 50 MCG
1 SPRAY, SUSPENSION (ML) NASAL DAILY
Qty: 3 EACH | Refills: 1 | Status: SHIPPED | OUTPATIENT
Start: 2022-07-21 | End: 2023-09-22

## 2022-07-21 ASSESSMENT — PATIENT HEALTH QUESTIONNAIRE - PHQ9
SUM OF ALL RESPONSES TO PHQ9 QUESTIONS 1 & 2: 0
1. LITTLE INTEREST OR PLEASURE IN DOING THINGS: 0
SUM OF ALL RESPONSES TO PHQ QUESTIONS 1-9: 0
SUM OF ALL RESPONSES TO PHQ QUESTIONS 1-9: 0
2. FEELING DOWN, DEPRESSED OR HOPELESS: 0
SUM OF ALL RESPONSES TO PHQ QUESTIONS 1-9: 0
SUM OF ALL RESPONSES TO PHQ QUESTIONS 1-9: 0

## 2022-07-21 NOTE — TELEPHONE ENCOUNTER
Dr Eusebio Gonzalez sent in prescription of golytely for patient. MA made attempt to contact patient to inform. Patient did not answer so MA left  informing of new prescription. Patient did return call to review.      Electronically signed by Jesus Alberto Guan on 7/21/22 at 1:12 PM EDT

## 2022-07-21 NOTE — TELEPHONE ENCOUNTER
MA received call from patient stating they were going to resubmit his bowel prep at the pharmacy but it would require prior authorization. MA contacted [facility] and informed that it is not covered and prior authorization is not applicable. They recommend one of the covered gavolyte products. MA routing message to Dr Bon Saucedo for advisement on bowel prep for patient.      Electronically signed by Shani Lema on 7/21/22 at 10:49 AM EDT

## 2022-07-21 NOTE — PROGRESS NOTES
[address] [place]     22  Jevon Erickson : 1970 Sex: male  Age: 45 y.o. Chief Complaint   Patient presents with    Hypertension     4 months; recheck b12 & d       HPI    Patient presents today for 4-month follow-up visit on his medical problems. Viewed my last note. He has seen his endoscopist and is set up for colonoscopy sometime next month. I did encourage him to follow through with this. Since I have seen him he is also signed up with a  and is working with her. He is trying to lose weight. He is down about 3 pounds from when I saw him last but has gotten more serious here in the last couple weeks. 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 Tells me blood pressure has been stable and controlled on his current antihypertensive medication. I did review his last labs and everything looked good except vitamin D and vitamin B12 were borderline lower side. He states he really not been taking these regularly and has now since started doing so. Sleep apnea stable and controlled on his BiPAP. Review of Systems  Constitutional:Negative for fatigue, activity change, appetite change, chills, diaphoresis, fever and unexpected weight change. Morbidly obese   HENT: Negative for congestion, ear pain, hearing loss, postnasal drip, rhinorrhea and sinus pain. Respiratory: Negative for cough, shortness of breath and wheezing. Cardiovascular: Negative for chest pain, palpitations . Gastrointestinal: Negative for abdominal pain, blood in stool, constipation, diarrhea, nausea and vomiting. Endocrine: Negative. Genitourinary: Negative for difficulty urinating, dysuria, frequency, hematuria and urgency. Musculoskeletal: Negative for arthralgias, back pain, gait problem and myalgias. Skin:    Denies rashes pruritus or skin lesions.    Allergic/Immunologic: Negative for environmental allergies and immunocompromised state. Neurological: Negative for dizziness, weakness, light-headedness, numbness and headaches. Hematological: Formally was anticoagulated for DVT is now finished  Psychiatric/Behavioral: The patient is not nervous/anxious. Uses BiPAP with success                 REST OF PERTINENT ROS GONE OVER AND WAS NEGATIVE.                Current Outpatient Medications:     fluticasone (FLONASE) 50 MCG/ACT nasal spray, 1 spray by Nasal route in the morning., Disp: 3 each, Rfl: 1    lisinopril-hydroCHLOROthiazide (PRINZIDE;ZESTORETIC) 10-12.5 MG per tablet, 1 PILL BY MOUTH DAILY, Disp: 90 tablet, Rfl: 1    VITAMIN D PO, Take by mouth, Disp: , Rfl:     vitamin B-12 (CYANOCOBALAMIN) 1000 MCG tablet, Take 1,000 mcg by mouth daily, Disp: , Rfl:     ferrous sulfate 325 (65 Fe) MG tablet, Take 325 mg by mouth daily (with breakfast), Disp: , Rfl:   No Known Allergies    Past Medical History:   Diagnosis Date    Ankle pain     Chronic back pain     Chronic knee pain     Colon polyps     Diverticulitis     Dizzy spells     Essential hypertension 9/18/2019    Fatty liver     Foot pain     Gout     occaisional flareups    Hyperlipidemia     Hypertension     Iron deficiency     Obesity     Routine chest x-ray     Unspecified sleep apnea     Vitamin B12 deficiency     Vitamin D deficiency 11/10/2010     Past Surgical History:   Procedure Laterality Date    COLONOSCOPY  07/20/2017    COLONOSCOPY N/A 8/5/2019    COLONOSCOPY POLYPECTOMY SNARE/COLD BIOPSY performed by Kristina Pinon MD at [address] [place] [address]    Right knee    LEG SURGERY  1975    Right leg    ABIMAEL-EN-Y GASTRIC BYPASS  09/27/2010    Dr. Amy Cole  1994/1995/2003    x3     Family History   Problem Relation Age of Onset    High Blood Pressure Father     High Cholesterol Father     Heart Disease Father     Heart Attack Father     Colon Cancer Father     Other Father         Renal failure    Heart Attack Brother     Heart Disease Brother     Cancer Mother         Ovarian    Breast Cancer Mother     Other Sister         Fibromyalgia    Cancer Maternal Uncle         Brain     Social History     Socioeconomic History    Marital status:      Spouse name: Not on file    Number of children: Not on file    Years of education: Not on file    Highest education level: Not on file   Occupational History    Not on file   Tobacco Use    Smoking status: Never    Smokeless tobacco: Current     Types: Snuff   Vaping Use    Vaping Use: Never used   Substance and Sexual Activity    Alcohol use: Yes     Comment: moderately (1-2 times per month)    Drug use: No    Sexual activity: Yes     Partners: Female   Other Topics Concern    Not on file   Social History Narrative    Not on file     Social Determinants of Health     Financial Resource Strain: Low Risk     Difficulty of Paying Living Expenses: Not hard at all   Food Insecurity: No Food Insecurity    Worried About Running Out of Food in the Last Year: Never true    Ran Out of Food in the Last Year: Never true   Transportation Needs: Not on file   Physical Activity: Not on file   Stress: Not on file   Social Connections: Not on file   Intimate Partner Violence: Not on file   Housing Stability: Not on file       Vitals:    07/21/22 0720   BP: 118/68   Pulse: 71   Temp: 96.9 °F (36.1 °C)   TempSrc: Temporal   SpO2: 98%   Weight: (!) 385 lb 3.2 oz (174.7 kg)   Height: 6' 1\" (1.854 m)       Physical Exam    Const: Appears well developed and well nourished. Appears morbidly obese. No signs of acute distress  present. Neck: Supple and symmetric. Palpation reveals no adenopathy. No masses appreciated. Thyroid  exhibits no nodule or thyromegaly. No JVD. Carotids: 2+ and equal bilaterally, without bruits. Resp: No rales, rhonchi or wheezes appreciated over the lungs bilaterally. CV: Rate is regular. Rhythm is regular. S1 is normal. S2 is normal. No gallop or rubs.  No heart  murmur appreciated. Extremities: Edema, but no clubbing or cyanosis  Abdomen: Bowel sounds are normoactive/obese Palpation reveals softness, with no distension,  organomegaly or tenderness. No abdominal masses palpable/upper abdominal midline hernia. No  palpable hepatosplenomegaly. Musculoskeletal: Trace bilateral lower extremity edema with right greater than left. Psych: Patient's attitude is cooperative. Patient's affect is appropriate. Judgement is realistic. Insight  is appropriate           Assessment and Plan:  Aram Wynn was seen today for hypertension. Diagnoses and all orders for this visit:    Essential hypertension  -     lisinopril-hydroCHLOROthiazide (PRINZIDE;ZESTORETIC) 10-12.5 MG per tablet; 1 PILL BY MOUTH DAILY  -     Basic Metabolic Panel; Future    Vit B12 defic anemia d/t slctv vit B12 malabsorp w protein  -     Vitamin B12; Future    Vitamin D deficiency  -     Vitamin D 25 Hydroxy; Future    Obstructive sleep apnea    Class 3 severe obesity due to excess calories without serious comorbidity with body mass index (BMI) of 50.0 to 59.9 in adult [facility])    Other orders  -     fluticasone (FLONASE) 50 MCG/ACT nasal spray; 1 spray by Nasal route in the morning. Plan: Continue activity level. I did offer potential weight management referral which she wants to wait and see on see how he does with his current measures. We will check vitamin B12 vitamin D and BMP in about a month. Continue weight loss attempts. Monitor blood pressure. Notify us of problems in the interim. Also 4 months and as needed. No follow-ups on file. Seen By:  Gene Pruitt MD      *Document was created using voice recognition software. Note was reviewed however may contain grammatical errors.

## 2022-08-10 ENCOUNTER — TELEPHONE (OUTPATIENT)
Dept: FAMILY MEDICINE CLINIC | Age: 52
End: 2022-08-10

## 2022-08-10 DIAGNOSIS — I10 ESSENTIAL HYPERTENSION: ICD-10-CM

## 2022-08-10 DIAGNOSIS — D51.1 VIT B12 DEFIC ANEMIA D/T SLCTV VIT B12 MALABSORP W PROTEIN: ICD-10-CM

## 2022-08-10 DIAGNOSIS — E55.9 VITAMIN D DEFICIENCY: ICD-10-CM

## 2022-08-10 LAB
ANION GAP SERPL CALCULATED.3IONS-SCNC: 13 MMOL/L (ref 7–16)
BUN BLDV-MCNC: 16 MG/DL (ref 6–20)
CALCIUM SERPL-MCNC: 9.5 MG/DL (ref 8.6–10.2)
CHLORIDE BLD-SCNC: 107 MMOL/L (ref 98–107)
CO2: 26 MMOL/L (ref 22–29)
CREAT SERPL-MCNC: 1.2 MG/DL (ref 0.7–1.2)
GFR AFRICAN AMERICAN: >60
GFR NON-AFRICAN AMERICAN: >60 ML/MIN/1.73
GLUCOSE BLD-MCNC: 96 MG/DL (ref 74–99)
POTASSIUM SERPL-SCNC: 4.3 MMOL/L (ref 3.5–5)
SODIUM BLD-SCNC: 146 MMOL/L (ref 132–146)
VITAMIN B-12: 296 PG/ML (ref 211–946)
VITAMIN D 25-HYDROXY: 57 NG/ML (ref 30–100)

## 2022-08-10 NOTE — TELEPHONE ENCOUNTER
12 still hovering at the lower end of normal range. Please put in note field to recheck that number at next visit. Make sure he is taking the B12 daily.

## 2022-08-24 NOTE — PROGRESS NOTES
[facility] PRE-ADMISSION TESTING   ENDOSCOPY/ COLONSCOPY INSTRUCTIONS  PAT- Phone Number: [phone]    ENDOSCOPY/ COLONSCOPY INSTRUCTIONS:     [x] Bowel Prep instructions reviewed. [x] Colonoscopy- The day prior: No solid foods. Clear liquids only. [x] Nothing by mouth after midnight. Including no gum, candy, mints, or water. [x] You may brush your teeth, gargle, but do NOT swallow water. [x] Do not wear makeup, lotions, powders, deodorant. [] Urine Pregnancy test will be preformed the day of surgery. A specimen sample may be brought from home. [x] Arrange transportation with a responsible adult  to and from the hospital. If you do not have a responsible adult  to transport you, you will need to make arrangements with a medical transportation company. Arrange for someone to be with you for the remainder of the day and for 24 hours after your procedure due to having had anesthesia. -Who will be your  for transportation? Wife- Madisyn Mata  -Who will be staying with you for 24 hrs after your procedure? Wife- Madisyn Mata    PARKING INSTRUCTIONS:     [x] ARRIVAL DATE & TIME: 8/29/22 @ 8:20am  [x] Enter into the [facility]. Two people may accompany you. Masks are required. [x] Parking Lot \"I\" is where you will park. It is located on the corner of [address] and [address]. The entrance is on [address]. Upon entering the parking lot, a voucher ticket will print    EDUCATION INSTRUCTIONS:    [x] Bring a complete list of your medications, please write the last time you took the medicine, give this list to the nurse in Pre-Op. [x] Take only the following medications the morning of surgery with 1-2 ounces of water:   [x] Stop all herbal supplements and vitamins 5 days before surgery. Stop NSAIDS 7 days before surgery. [] DO NOT take any diabetic medicine the morning of surgery. Follow instructions for insulin the day before surgery.   [] If you are diabetic and your blood sugar is low or you feel symptomatic, you may drink 1-2 ounces of apple juice or take a glucose tablet.            -The morning of your procedure, you may call the pre-op area if you have concerns about your blood sugar [phone]. [] Use your inhalers the morning of surgery. Bring your emergency inhaler with you day of surgery. [x] Follow physician instructions regarding any blood thinners you may be taking. WHAT TO EXPECT:    [x] The day of your procedure you will be greeted and checked in by the [facility] [employer] [facility].  In addition, you will be registered in the [facility] by a Patient Access Representative. Please bring your photo ID and insurance card. A nurse will greet you in accordance to the time you are needed in the pre-op area to prepare you for surgery. Please do not be discouraged if you are not greeted in the order you arrive as there are many variables that are involved in patient preparation. Your patience is greatly appreciated as you wait for your nurse. Please bring in items such as: books, magazines, newspapers, electronics, or any other items  to occupy your time in the waiting area. [x]  Delays may occur. Staff will make a sincere effort to keep you informed of delays. If any delays occur with your procedure, we apologize ahead of time for your inconvenience as we recognize the value of your time.

## 2022-08-29 ENCOUNTER — ANESTHESIA EVENT (OUTPATIENT)
Dept: ENDOSCOPY | Age: 52
End: 2022-08-29
Payer: MEDICAID

## 2022-08-29 ENCOUNTER — HOSPITAL ENCOUNTER (OUTPATIENT)
Age: 52
Setting detail: OUTPATIENT SURGERY
Discharge: HOME | End: 2022-08-29
Attending: SURGERY | Admitting: SURGERY
Payer: MEDICAID

## 2022-08-29 ENCOUNTER — ANESTHESIA (OUTPATIENT)
Dept: ENDOSCOPY | Age: 52
End: 2022-08-29
Payer: MEDICAID

## 2022-08-29 VITALS
HEIGHT: 72 IN | TEMPERATURE: 97.1 F | WEIGHT: 315 LBS | HEART RATE: 68 BPM | SYSTOLIC BLOOD PRESSURE: 158 MMHG | DIASTOLIC BLOOD PRESSURE: 80 MMHG | RESPIRATION RATE: 18 BRPM | BODY MASS INDEX: 42.66 KG/M2 | OXYGEN SATURATION: 96 %

## 2022-08-29 DIAGNOSIS — Z12.11 COLON CANCER SCREENING: ICD-10-CM

## 2022-08-29 PROCEDURE — 2580000003 HC RX 258: Performed by: SURGERY

## 2022-08-29 PROCEDURE — 2580000003 HC RX 258

## 2022-08-29 PROCEDURE — 3700000001 HC ADD 15 MINUTES (ANESTHESIA): Performed by: SURGERY

## 2022-08-29 PROCEDURE — 7100000010 HC PHASE II RECOVERY - FIRST 15 MIN: Performed by: SURGERY

## 2022-08-29 PROCEDURE — 2709999900 HC NON-CHARGEABLE SUPPLY: Performed by: SURGERY

## 2022-08-29 PROCEDURE — 3609010600 HC COLONOSCOPY POLYPECTOMY SNARE/COLD BIOPSY: Performed by: SURGERY

## 2022-08-29 PROCEDURE — 45385 COLONOSCOPY W/LESION REMOVAL: CPT | Performed by: SURGERY

## 2022-08-29 PROCEDURE — 7100000011 HC PHASE II RECOVERY - ADDTL 15 MIN: Performed by: SURGERY

## 2022-08-29 PROCEDURE — 88305 TISSUE EXAM BY PATHOLOGIST: CPT

## 2022-08-29 PROCEDURE — 6360000002 HC RX W HCPCS: Performed by: NURSE ANESTHETIST, CERTIFIED REGISTERED

## 2022-08-29 PROCEDURE — 2500000003 HC RX 250 WO HCPCS

## 2022-08-29 PROCEDURE — 3700000000 HC ANESTHESIA ATTENDED CARE: Performed by: SURGERY

## 2022-08-29 RX ORDER — SODIUM CHLORIDE 0.9 % (FLUSH) 0.9 %
5-40 SYRINGE (ML) INJECTION PRN
Status: DISCONTINUED | OUTPATIENT
Start: 2022-08-29 | End: 2022-08-29 | Stop reason: HOSPADM

## 2022-08-29 RX ORDER — PROPOFOL 10 MG/ML
INJECTION, EMULSION INTRAVENOUS PRN
Status: DISCONTINUED | OUTPATIENT
Start: 2022-08-29 | End: 2022-08-29 | Stop reason: SDUPTHER

## 2022-08-29 RX ORDER — SODIUM CHLORIDE 9 MG/ML
INJECTION, SOLUTION INTRAVENOUS CONTINUOUS PRN
Status: DISCONTINUED | OUTPATIENT
Start: 2022-08-29 | End: 2022-08-29 | Stop reason: SDUPTHER

## 2022-08-29 RX ORDER — SODIUM CHLORIDE 9 MG/ML
INJECTION, SOLUTION INTRAVENOUS CONTINUOUS
Status: DISCONTINUED | OUTPATIENT
Start: 2022-08-29 | End: 2022-08-29 | Stop reason: HOSPADM

## 2022-08-29 RX ORDER — SODIUM CHLORIDE 9 MG/ML
25 INJECTION, SOLUTION INTRAVENOUS PRN
Status: DISCONTINUED | OUTPATIENT
Start: 2022-08-29 | End: 2022-08-29 | Stop reason: HOSPADM

## 2022-08-29 RX ORDER — GLYCOPYRROLATE 1 MG/5 ML
SYRINGE (ML) INTRAVENOUS PRN
Status: DISCONTINUED | OUTPATIENT
Start: 2022-08-29 | End: 2022-08-29 | Stop reason: SDUPTHER

## 2022-08-29 RX ORDER — SODIUM CHLORIDE 0.9 % (FLUSH) 0.9 %
5-40 SYRINGE (ML) INJECTION EVERY 12 HOURS SCHEDULED
Status: DISCONTINUED | OUTPATIENT
Start: 2022-08-29 | End: 2022-08-29 | Stop reason: HOSPADM

## 2022-08-29 RX ADMIN — SODIUM CHLORIDE: 9 INJECTION, SOLUTION INTRAVENOUS at 08:46

## 2022-08-29 RX ADMIN — SODIUM CHLORIDE: 9 INJECTION, SOLUTION INTRAVENOUS at 09:18

## 2022-08-29 RX ADMIN — Medication 0.2 MG: at 09:24

## 2022-08-29 RX ADMIN — PROPOFOL 420 MG: 10 INJECTION, EMULSION INTRAVENOUS at 09:24

## 2022-08-29 ASSESSMENT — PAIN - FUNCTIONAL ASSESSMENT: PAIN_FUNCTIONAL_ASSESSMENT: NONE - DENIES PAIN

## 2022-08-29 NOTE — ANESTHESIA PRE PROCEDURE
Department of Anesthesiology  Preprocedure Note       Name:  Jevon Erickson   Age:  45 y.o.  :  1970                                          MRN:  [id number]         Date:  2022      Surgeon: Christen Masters):  Jorge Vera MD    Procedure: COLONOSCOPY    Medications prior to admission:   Prior to Admission medications    Medication Sig Start Date End Date Taking?  Authorizing Provider   fluticasone (FLONASE) 50 MCG/ACT nasal spray 1 spray by Nasal route in the morning. 22  Debo Plascencia MD   lisinopril-hydroCHLOROthiazide (PRINZIDE;ZESTORETIC) 10-12.5 MG per tablet 1 PILL BY MOUTH DAILY  Patient taking differently: nightly 1 PILL BY MOUTH DAILY 22   Debo Plascencia MD   VITAMIN D PO Take by mouth    Historical Provider, MD   vitamin B-12 (CYANOCOBALAMIN) 1000 MCG tablet Take 1,000 mcg by mouth daily    Historical Provider, MD   ferrous sulfate 325 (65 Fe) MG tablet Take 325 mg by mouth daily (with breakfast)    Historical Provider, MD       Current medications:    Current Facility-Administered Medications   Medication Dose Route Frequency Provider Last Rate Last Admin    0.9 % sodium chloride infusion   IntraVENous Continuous Jorge Vera MD        sodium chloride flush 0.9 % injection 5-40 mL  5-40 mL IntraVENous 2 times per day Jorge Vera MD        sodium chloride flush 0.9 % injection 5-40 mL  5-40 mL IntraVENous PRN Jorge Vera MD        0.9 % sodium chloride infusion  25 mL IntraVENous PRN Jorge Vera MD           Allergies:  No Known Allergies    Problem List:    Patient Active Problem List   Diagnosis Code    Vitamin D deficiency E55.9    Thiamin deficiency E51.9    Postsurgical nonabsorption K91.2    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    Hyperlipidemia E78.5    Essential hypertension I10       Past Medical History:        Diagnosis Date    Ankle pain     Chronic back pain     Chronic knee pain     Colon polyps     Diverticulitis     Dizzy spells     Essential hypertension 9/18/2019    Fatty liver     Foot pain     Gout     occaisional flareups    Hyperlipidemia     Hypertension     Iron deficiency     Obesity     Routine chest x-ray     Unspecified sleep apnea     Vitamin B12 deficiency     Vitamin D deficiency 11/10/2010       Past Surgical History:        Procedure Laterality Date    COLONOSCOPY  07/20/2017    COLONOSCOPY N/A 8/5/2019    COLONOSCOPY POLYPECTOMY SNARE/COLD BIOPSY performed by Lluvia Aiken MD at [facility]    Right knee    LEG SURGERY  1975    Right leg    ABIMAEL-EN-Y GASTRIC BYPASS  09/27/2010    Dr. David Nash  1994/1995/2003    x3       Social History:    Social History     Tobacco Use    Smoking status: Never    Smokeless tobacco: Current     Types: Snuff   Substance Use Topics    Alcohol use: Yes     Comment: moderately- weekends only                                Ready to quit: Not Answered  Counseling given: Not Answered      Vital Signs (Current):   Vitals:    08/24/22 1159 08/29/22 0830   BP:  (!) 162/79   Pulse:  60   Resp:  18   Temp:  36.5 °C (97.7 °F)   SpO2:  95%   Weight: (!) 375 lb (170.1 kg) (!) 380 lb (172.4 kg)   Height: 6' 1\" (1.854 m) 6' (1.829 m)                                              BP Readings from Last 3 Encounters:   08/29/22 (!) 162/79   07/21/22 118/68   06/24/22 (!) 141/74       NPO Status: Time of last liquid consumption: 2030                        Time of last solid consumption: 1500                        Date of last liquid consumption: 08/28/22                        Date of last solid food consumption: 08/27/22    BMI:   Wt Readings from Last 3 Encounters:   08/29/22 (!) 380 lb (172.4 kg)   07/21/22 (!) 385 lb 3.2 oz (174.7 kg)   06/24/22 (!) 388 lb (176 kg)     Body mass index is 51.54 kg/m².     CBC:   Lab Results   Component Value Date/Time    WBC 5.9 03/18/2022 07:40 AM    RBC 5.70 03/18/2022 07:40 AM    HGB 16.7 03/18/2022 07:40 AM    HCT 52.0 03/18/2022 07:40 AM    MCV 91.2 03/18/2022 07:40 AM    RDW 13.2 03/18/2022 07:40 AM     03/18/2022 07:40 AM       CMP:   Lab Results   Component Value Date/Time     08/10/2022 07:38 AM    K 4.3 08/10/2022 07:38 AM    K 4.7 09/04/2019 05:56 PM     08/10/2022 07:38 AM    CO2 26 08/10/2022 07:38 AM    BUN 16 08/10/2022 07:38 AM    CREATININE 1.2 08/10/2022 07:38 AM    GFRAA >60 08/10/2022 07:38 AM    LABGLOM >60 08/10/2022 07:38 AM    GLUCOSE 96 08/10/2022 07:38 AM    GLUCOSE 68 10/25/2010 09:30 AM    PROT 6.9 03/18/2022 07:40 AM    CALCIUM 9.5 08/10/2022 07:38 AM    BILITOT 0.6 03/18/2022 07:40 AM    ALKPHOS 62 03/18/2022 07:40 AM    AST 24 03/18/2022 07:40 AM    ALT 16 03/18/2022 07:40 AM       POC Tests: No results for input(s): POCGLU, POCNA, POCK, POCCL, POCBUN, POCHEMO, POCHCT in the last 72 hours. Coags: No results found for: PROTIME, INR, APTT    HCG (If Applicable): No results found for: PREGTESTUR, PREGSERUM, HCG, HCGQUANT     ABGs: No results found for: PHART, PO2ART, RSU0NTF, DPM1LGC, BEART, A4FRHQZN     Type & Screen (If Applicable):  No results found for: LABABO, [address]    Anesthesia Evaluation  Patient summary reviewed no history of anesthetic complications:   Airway: Mallampati: III  TM distance: >3 FB   Neck ROM: full  Mouth opening: > = 3 FB   Dental:          Pulmonary:   (+) sleep apnea: on CPAP,                             Cardiovascular:    (+) hypertension:,          Beta Blocker:  Not on Beta Blocker         Neuro/Psych:   Negative Neuro/Psych ROS              GI/Hepatic/Renal:   (+) liver disease (fatty liver):, morbid obesity          Endo/Other: Negative Endo/Other ROS                    Abdominal:             Vascular: negative vascular ROS. Other Findings:             Anesthesia Plan      MAC     ASA 3       Induction: intravenous. Plan discussed with surgical team and attending.                     Donis Escobar APRN - CRNA   8/29/2022

## 2022-08-29 NOTE — OP NOTE
[address] [place] [address]  LOWER ENDOSCOPY REPORT    DATE OF PROCEDURE: 8/29/2022    SURGEON: SOLO Ghosh Flurry: None    PREOPERATIVE DIAGNOSIS: High risk colorectal cancer screening for adenomatous colon polyp removed 3 years ago    POSTOPERATIVE DIAGNOSIS: Same good prep, marked sigmoid diverticulosis, descending colon polyp    OPERATION: Total colonoscopy to cecum with cold snare of descending colon polyp    ANESTHESIA: Local monitored anesthesia. ESTIMATED BLOOD LOSS: less than 5 ml    COMPLICATIONS: None. SPECIMENS:  Was Obtained: Descending colon polyp    HISTORY: The patient is a 45y.o. year old male with history of above preop diagnosis. I recommended colonoscopy with possible biopsy or polypectomy and I explained the risk, benefits, expected outcome, and alternatives to the procedure. Risks included but are not limited to bleeding, infection, respiratory distress, hypotension, and perforation of the colon. The patient understands and is in agreement. PROCEDURE: The patient was given IV conscious sedation per anesthesia. The patient was given supplemental oxygen by nasal cannula. I performed a digital rectal exam and no masses were palpated . The colonoscope was inserted per rectum and advanced under direct vision to the cecum without difficulty. The prep was good so exam was adequate. FINDINGS:  Cecum/Ascending colon: appendiceal orifice noted, iliocecal valve visualized, normal    Transverse colon: normal    Descending/Sigmoid colon: abnormal: Marked sigmoid diverticulosis, 4 mm descending colon polyp removed by cold snare    Rectum/Anus: examined in normal and retroflexed positions and was abnormal: Mild internal hemorrhoids    The colon was decompressed and the scope was removed. The withdraw time was approximately 8 minutes. The patient tolerated the procedure well.      ASSESSMENT/PLAN:   Await pathology of colon polyp  Marked diverticulosis-continue high-fiber diet  Colorectal Cancer Screening - recommend repeat colonoscopy in 3 years due to polypectomy and the fact that every 3 years patient has polyps      Tonia Perez MD, FACS  8/29/2022  9:59 AM

## 2022-08-29 NOTE — H&P
[facility]  HISTORY & PHYSICAL      CC:  Colorectal cancer screening         HPI:     Jevon Erickson is here as Established patient(8/29/2022). The patient was sent here to discuss colorectal cancer screening. The patient denies any weight loss, rectal bleeding, abdominal pain, or change in bowel habits. There is no family history of IBD or colorectal cancer. The patient had a colonoscopy 3 years ago and 2 tubular adenomas were removed. Pt is due this year. Pt  Also has a hernia that is not causing pain.      Past Medical History:   Diagnosis Date    Ankle pain     Chronic back pain     Chronic knee pain     Colon polyps     Diverticulitis     Dizzy spells     Essential hypertension 9/18/2019    Fatty liver     Foot pain     Gout     occaisional flareups    Hyperlipidemia     Hypertension     Iron deficiency     Obesity     Routine chest x-ray     Unspecified sleep apnea     Vitamin B12 deficiency     Vitamin D deficiency 11/10/2010     Past Surgical History:   Procedure Laterality Date    COLONOSCOPY  07/20/2017    COLONOSCOPY N/A 8/5/2019    COLONOSCOPY POLYPECTOMY SNARE/COLD BIOPSY performed by Jorge Vera MD at [address] [place] [address] ARTHROSCOPY  1985    Right knee    LEG SURGERY  1975    Right leg    ABIMAEL-EN-Y GASTRIC BYPASS  09/27/2010    Dr. Karin Contreras  1994/1995/2003    x3     Social History     Socioeconomic History    Marital status:      Spouse name: Not on file    Number of children: Not on file    Years of education: Not on file    Highest education level: Not on file   Occupational History    Not on file   Tobacco Use    Smoking status: Never    Smokeless tobacco: Current     Types: Snuff   Vaping Use    Vaping Use: Never used   Substance and Sexual Activity    Alcohol use: Yes     Comment: moderately- weekends only    Drug use: No    Sexual activity: Yes     Partners: Female   Other Topics Concern    Not on file   Social History Narrative Not on file     Social Determinants of Health     Financial Resource Strain: Not on file   Food Insecurity: Not on file   Transportation Needs: Not on file   Physical Activity: Not on file   Stress: Not on file   Social Connections: Not on file   Intimate Partner Violence: Not on file   Housing Stability: Not on file     No Known Allergies     I have reviewed and confirmed the past medical history, surgical history, social history, allergies in the chart. Medications: I have reviewed the medication list in the chart. Review of Systems:  Review of Systems - History obtained from the patient  General ROS: negative  Psychological ROS: negative  Ophthalmic ROS: negative for - blurry vision, double vision or loss of vision  ENT ROS: negative for - epistaxis, sore throat, vocal changes or malocclusion  Respiratory ROS: negative for - pleuritic pain, shortness of breath or tachypnea  Cardiovascular ROS: negative for - chest pain or palpitations  Gastrointestinal ROS: negative for - abdominal pain or gas/bloating  Genito-Urinary ROS: negative for - hematuria or pelvic pain  Endocrine ROS: negative   Heme ROS: negative   Musculoskeletal ROS: negative  Neurological ROS: negative for - confusion, dizziness, headaches, numbness/tingling, seizures or weakness    Physical Exam   BP (!) 162/79   Pulse 60   Temp 97.7 °F (36.5 °C)   Resp 18   Ht 6' (1.829 m)   Wt (!) 380 lb (172.4 kg)   SpO2 95%   BMI 51.54 kg/m²   Vitals:    08/29/22 0830   BP: (!) 162/79   Pulse: 60   Resp: 18   Temp: 97.7 °F (36.5 °C)   SpO2: 95%       PSYCH: mood and affect normal, alert and oriented x 3  CONSTITUTIONAL: No apparent distress, comfortable  EYES: Sclera white, pupils equal round and reactive to light  ENMT:  Hearing normal, trachea midline, ears externally intact  LYMPH: no lympadenopathy in neck. No lympadenopathy in groins  RESP: Breath sounds were clear and equal with no rales, wheezes, or rhonchi.               Respiratory effort was normal with no retractions or use of accessory muscles. CV: Heart sounds were normal with a regular rate and rhythm. No pedal edema  GI/ Abdomen: The abdomen was soft and non distended. There was no tenderness, guarding, rebound, or rigidity. There was no                     masses, hepatosplenomegaly reducible epigastric hernia  Rectal -Deferred  MSK: no clubbing/ no cyanosis/ gait normal       Assessment:    Average risk for colorectal cancer   Epigastric hernia    Plan:  Screening colonoscopy  I discussed the options for colorectal cancer screening with the patient including options of fecal occult blood testing with flexible sigmoidoscopy or air contrast barium enema. I also discussed the risks and benefits of colonoscopy with possible biopsy/polypectomy/cauterization. I recommended colonoscopy with deep sedation. The patient understands the risks of bleeding and perforation (<0.1%) and agrees to proceed.   2 days of clear liquids prior to procedure     Schedule at [place] [address].  Epigastric hernia-asymptomatic reducible-continue monitor      Basilio Diaz MD, FACS  8/29/2022  8:33 AM

## 2022-08-29 NOTE — ANESTHESIA POSTPROCEDURE EVALUATION
Department of Anesthesiology  Postprocedure Note    Patient: Jevon Erickson  MRN: [id number]  [place]: 1970  Date of evaluation: 8/29/2022      Procedure Summary     Date: 08/29/22 Room / Location: [place] [address] / [facility]    Anesthesia Start: 6073 Anesthesia Stop: 7661    Procedure: COLONOSCOPY POLYPECTOMY SNARE/COLD BIOPSY Diagnosis:       Colon cancer screening      (SCREENING)    Surgeons: Jorge Vera MD Responsible Provider: Arsh Holloway MD    Anesthesia Type: MAC ASA Status: 3          Anesthesia Type: No value filed. Odin Phase I: Odin Score: 10    Odin Phase II: Odin Score: 10      Anesthesia Post Evaluation    Patient location during evaluation: PACU  Patient participation: complete - patient participated  Level of consciousness: awake and alert  Airway patency: patent  Nausea & Vomiting: no nausea and no vomiting  Complications: no  Cardiovascular status: hemodynamically stable  Respiratory status: acceptable  Hydration status: euvolemic  There was medical reason for not using a multimodal analgesia pain management approach.

## 2022-08-29 NOTE — DISCHARGE INSTRUCTIONS
[facility]   Dr. Thad Ríos  [address] [facility] [address]  Phone: [phone]  Fax:  [phone]    POST-PROCEDURE INSTRUCTIONS FOR COLONOSCOPY    Nick Conchis  8/29/2022    You underwent an colonoscopy today--polypectomy of sigmoid colon polyp    You were found to have A single benign appearing colon polyp, removed as above. Marked colonic diverticulosis involving  the sigmoid    You may experience: Increased amount of gas. Slight abdominal bloating and/or cramping. Diet:  You may resume your normal diet. Plan: You may resume your prescribed medications  . Await pathology. Repeat colonoscopy in 3 years. Activity: no driving today. Ok to resume regular activity starting tomorrow    Follow-up: Please call [phone] with any questions or concerns and to schedule a follow up visit as needed with Dr. Thad Ríos.       At the following locations:  [address] [facility]   [address] [place] [address], [address]   [facility], [address] [place] [address]

## 2022-09-01 ENCOUNTER — TELEPHONE (OUTPATIENT)
Dept: FAMILY MEDICINE CLINIC | Age: 52
End: 2022-09-01

## 2022-09-01 NOTE — TELEPHONE ENCOUNTER
Pt had a colonoscopy w/ Dr Camila Isaacs. He was sent a copy of the pathology but does not understand what it says. Their is a copy in the chart and the Dx is tubular adenoma. I let pt know that this is a precancerous polyp. He does not have a post op appt w/ Dr Camila Isaacs but was told after the procedure that he would need a repeat colonoscopy jn 3 years. Do you have any concerns that you would like the patient to be aware of or any additional recommendations?

## 2022-09-02 NOTE — TELEPHONE ENCOUNTER
Not sure why the surgeon is not addressing these issues with him. You are correct about the description as being premalignant. If the surgeon told him 3 years then that is when he should follow-up for another colonoscopy.   He should call the surgeon if he has more specific questions

## 2022-09-22 ENCOUNTER — TELEPHONE (OUTPATIENT)
Dept: FAMILY MEDICINE CLINIC | Age: 52
End: 2022-09-22

## 2022-09-22 ENCOUNTER — OFFICE VISIT (OUTPATIENT)
Dept: FAMILY MEDICINE CLINIC | Age: 52
End: 2022-09-22
Payer: MEDICAID

## 2022-09-22 VITALS
WEIGHT: 315 LBS | SYSTOLIC BLOOD PRESSURE: 110 MMHG | HEART RATE: 77 BPM | OXYGEN SATURATION: 96 % | TEMPERATURE: 96.3 F | DIASTOLIC BLOOD PRESSURE: 68 MMHG | BODY MASS INDEX: 42.66 KG/M2 | HEIGHT: 72 IN

## 2022-09-22 DIAGNOSIS — A08.4 VIRAL GASTROENTERITIS: ICD-10-CM

## 2022-09-22 DIAGNOSIS — R50.9 FEVER, UNSPECIFIED FEVER CAUSE: Primary | ICD-10-CM

## 2022-09-22 DIAGNOSIS — R10.9 ABDOMINAL CRAMPING: ICD-10-CM

## 2022-09-22 DIAGNOSIS — R19.7 DIARRHEA, UNSPECIFIED TYPE: ICD-10-CM

## 2022-09-22 LAB
Lab: NORMAL
PERFORMING INSTRUMENT: NORMAL
QC PASS/FAIL: NORMAL
SARS-COV-2, POC: NORMAL

## 2022-09-22 PROCEDURE — G8427 DOCREV CUR MEDS BY ELIG CLIN: HCPCS | Performed by: INTERNAL MEDICINE

## 2022-09-22 PROCEDURE — 4004F PT TOBACCO SCREEN RCVD TLK: CPT | Performed by: INTERNAL MEDICINE

## 2022-09-22 PROCEDURE — 3017F COLORECTAL CA SCREEN DOC REV: CPT | Performed by: INTERNAL MEDICINE

## 2022-09-22 PROCEDURE — G8417 CALC BMI ABV UP PARAM F/U: HCPCS | Performed by: INTERNAL MEDICINE

## 2022-09-22 PROCEDURE — 99213 OFFICE O/P EST LOW 20 MIN: CPT | Performed by: INTERNAL MEDICINE

## 2022-09-22 PROCEDURE — 87426 SARSCOV CORONAVIRUS AG IA: CPT | Performed by: INTERNAL MEDICINE

## 2022-09-22 ASSESSMENT — ENCOUNTER SYMPTOMS
VOMITING: 0
SINUS PRESSURE: 0
CHEST TIGHTNESS: 0
SHORTNESS OF BREATH: 0
SINUS PAIN: 0
DIARRHEA: 1
RECTAL PAIN: 0
EYES NEGATIVE: 1
COUGH: 0
BLOOD IN STOOL: 0
NAUSEA: 0
WHEEZING: 0
ABDOMINAL PAIN: 1
SORE THROAT: 0

## 2022-09-22 NOTE — PROGRESS NOTES
[address] [place]     22  Tam Michel : 1970 Sex: male  Age: 45 y.o. Chief Complaint   Patient presents with    Abdominal Pain     Onset Monday afternoon; stomach pain; fever, chills, vomitting, diarrhea       HPI  Patient presents to express care today complaining of gastrointestinal symptoms which started 3 days ago. States this started Monday evening when he got home from work developed some stomach discomfort, low-grade fever of 100, chills, nausea vomiting and diarrhea which she states peaked 2 days ago. States the fever chills nausea and vomiting have abated. Still having some abdominal cramping and loose stool. Denies any black tarry stool denies any blood in the stool. Denies history of diverticulitis. States that symptoms have improved since initiation. States he is hydrating himself well although his appetite is not good. He did test for COVID at home and was negative. No one else around him is sick. Everyone's eaten the same food prior to all this starting. Has not been vaccinated for COVID. Did have COVID about a year ago. Review of Systems   Constitutional:  Negative for chills and fever. HENT:  Negative for congestion, ear pain, postnasal drip, sinus pressure, sinus pain and sore throat. Eyes: Negative. Respiratory:  Negative for cough, chest tightness, shortness of breath and wheezing. Cardiovascular:  Negative for chest pain. Gastrointestinal:  Positive for abdominal pain and diarrhea. Negative for blood in stool, nausea, rectal pain and vomiting. Abdominal cramping   Genitourinary:  Negative for dysuria, flank pain, frequency and hematuria. Musculoskeletal:  Negative for myalgias. Neurological:  Negative for headaches. REST OF PERTINENT ROS GONE OVER AND WAS NEGATIVE.                Current Outpatient Medications:     fluticasone (FLONASE) 50 MCG/ACT nasal spray, 1 spray by Nasal route in the morning., Disp: 3 each, Rfl: 1 lisinopril-hydroCHLOROthiazide (PRINZIDE;ZESTORETIC) 10-12.5 MG per tablet, 1 PILL BY MOUTH DAILY (Patient taking differently: nightly 1 PILL BY MOUTH DAILY), Disp: 90 tablet, Rfl: 1    VITAMIN D PO, Take by mouth, Disp: , Rfl:     vitamin B-12 (CYANOCOBALAMIN) 1000 MCG tablet, Take 1,000 mcg by mouth daily, Disp: , Rfl:     ferrous sulfate 325 (65 Fe) MG tablet, Take 325 mg by mouth daily (with breakfast), Disp: , Rfl:   No Known Allergies    Past Medical History:   Diagnosis Date    Ankle pain     Chronic back pain     Chronic knee pain     Colon polyps     Diverticulitis     Dizzy spells     Essential hypertension 9/18/2019    Fatty liver     Foot pain     Gout     occaisional flareups    Hyperlipidemia     Hypertension     Iron deficiency     Obesity     Routine chest x-ray     Unspecified sleep apnea     Vitamin B12 deficiency     Vitamin D deficiency 11/10/2010     Past Surgical History:   Procedure Laterality Date    COLONOSCOPY  07/20/2017    COLONOSCOPY N/A 8/5/2019    COLONOSCOPY POLYPECTOMY SNARE/COLD BIOPSY performed by Ward Enrique MD at [address] [facility] N/A 8/29/2022    COLONOSCOPY POLYPECTOMY SNARE/COLD BIOPSY performed by Ward Enrique MD at [facility]    Right knee    LEG SURGERY  1975    Right leg    ABIMAEL-EN-Y GASTRIC BYPASS  09/27/2010    Dr. Dlieep Saldivar  1994/1995/2003    x3     Family History   Problem Relation Age of Onset    High Blood Pressure Father     High Cholesterol Father     Heart Disease Father     Heart Attack Father     Colon Cancer Father     Other Father         Renal failure    Heart Attack Brother     Heart Disease Brother     Cancer Mother         Ovarian    Breast Cancer Mother     Other Sister         Fibromyalgia    Cancer Maternal Uncle         Brain     Social History     Socioeconomic History    Marital status:      Spouse name: Not on file    Number of children: Not on file    Years of education: Not on file    Highest education level: Not on file   Occupational History    Not on file   Tobacco Use    Smoking status: Never    Smokeless tobacco: Current     Types: Snuff   Vaping Use    Vaping Use: Never used   Substance and Sexual Activity    Alcohol use: Yes     Comment: moderately- weekends only    Drug use: No    Sexual activity: Yes     Partners: Female   Other Topics Concern    Not on file   Social History Narrative    Not on file     Social Determinants of Health     Financial Resource Strain: Not on file   Food Insecurity: Not on file   Transportation Needs: Not on file   Physical Activity: Not on file   Stress: Not on file   Social Connections: Not on file   Intimate Partner Violence: Not on file   Housing Stability: Not on file       Vitals:    09/22/22 0754   BP: 110/68   Pulse: 77   Temp: (!) 96.3 °F (35.7 °C)   TempSrc: Temporal   SpO2: 96%   Weight: (!) 380 lb (172.4 kg)   Height: 6' (1.829 m)       Physical Exam  Vitals and nursing note reviewed. Constitutional:       General: He is not in acute distress. Cardiovascular:      Rate and Rhythm: Normal rate and regular rhythm. Heart sounds: No murmur heard. Pulmonary:      Effort: Pulmonary effort is normal. No respiratory distress. Breath sounds: Normal breath sounds. No wheezing, rhonchi or rales. Abdominal:      General: Bowel sounds are normal.      Palpations: Abdomen is soft. Tenderness: There is abdominal tenderness. There is no guarding or rebound. Comments: Patient did have some very mild tenderness to palpation in the left lower quadrant area. No rebound or guarding. Musculoskeletal:      Cervical back: Normal range of motion and neck supple. No tenderness. Skin:     General: Skin is warm and dry. Neurological:      General: No focal deficit present. Mental Status: He is alert and oriented to person, place, and time.    Psychiatric:         Mood and Affect: Mood normal.         Behavior: Behavior normal.         Thought Content: Thought content normal.         Judgment: Judgment normal.               Assessment and Plan:  Janet Johansen was seen today for abdominal pain. Diagnoses and all orders for this visit:    Fever, unspecified fever cause  -     POCT COVID-19, Antigen  -     XR ABDOMEN (2 VIEWS); Future    Abdominal cramping  -     XR ABDOMEN (2 VIEWS); Future    Diarrhea, unspecified type  -     XR ABDOMEN (2 VIEWS); Future    Viral gastroenteritis    Plan: I do believe this is most likely a viral gastrointestinal process. Told him cannot completely exclude diverticulitis but he is improving and without fever I elected to hold any antibiotics at this time. I Cande Milan get plain film abdominal series. We will hold off on blood work today. Asked him to try some Pepto-Bismol. Call in the next 24 to 48 hours with an update. Push fluids. Any significant worsening to go to the hospital.  Keep next appointment. Notify us of problems in the interim. He did test negative for COVID here today. Return for keep fu. Seen By:  Debo Plascencia MD      *Document was created using voice recognition software. Note was reviewed however may contain grammatical errors.

## 2022-11-15 RX ORDER — FLUTICASONE PROPIONATE 50 MCG
1 SPRAY, SUSPENSION (ML) NASAL DAILY
Qty: 3 EACH | Refills: 1 | Status: SHIPPED | OUTPATIENT
Start: 2022-11-15 | End: 2022-12-15

## 2022-11-15 NOTE — TELEPHONE ENCOUNTER
Patient called for a refill on Flonase.  Please send to [facility] in [facility]

## 2022-11-17 ENCOUNTER — OFFICE VISIT (OUTPATIENT)
Dept: FAMILY MEDICINE CLINIC | Age: 52
End: 2022-11-17
Payer: MEDICAID

## 2022-11-17 VITALS
HEART RATE: 93 BPM | SYSTOLIC BLOOD PRESSURE: 130 MMHG | WEIGHT: 315 LBS | TEMPERATURE: 97.7 F | DIASTOLIC BLOOD PRESSURE: 80 MMHG | BODY MASS INDEX: 42.66 KG/M2 | OXYGEN SATURATION: 97 % | HEIGHT: 72 IN

## 2022-11-17 DIAGNOSIS — I10 ESSENTIAL HYPERTENSION: ICD-10-CM

## 2022-11-17 DIAGNOSIS — G47.33 OBSTRUCTIVE SLEEP APNEA: ICD-10-CM

## 2022-11-17 DIAGNOSIS — E53.8 VITAMIN B 12 DEFICIENCY: ICD-10-CM

## 2022-11-17 DIAGNOSIS — E66.01 CLASS 3 SEVERE OBESITY DUE TO EXCESS CALORIES WITH BODY MASS INDEX (BMI) OF 45.0 TO 49.9 IN ADULT, UNSPECIFIED WHETHER SERIOUS COMORBIDITY PRESENT (HCC): ICD-10-CM

## 2022-11-17 PROCEDURE — 3078F DIAST BP <80 MM HG: CPT | Performed by: INTERNAL MEDICINE

## 2022-11-17 PROCEDURE — 4004F PT TOBACCO SCREEN RCVD TLK: CPT | Performed by: INTERNAL MEDICINE

## 2022-11-17 PROCEDURE — 3074F SYST BP LT 130 MM HG: CPT | Performed by: INTERNAL MEDICINE

## 2022-11-17 PROCEDURE — G8484 FLU IMMUNIZE NO ADMIN: HCPCS | Performed by: INTERNAL MEDICINE

## 2022-11-17 PROCEDURE — G8427 DOCREV CUR MEDS BY ELIG CLIN: HCPCS | Performed by: INTERNAL MEDICINE

## 2022-11-17 PROCEDURE — 3017F COLORECTAL CA SCREEN DOC REV: CPT | Performed by: INTERNAL MEDICINE

## 2022-11-17 PROCEDURE — 99213 OFFICE O/P EST LOW 20 MIN: CPT | Performed by: INTERNAL MEDICINE

## 2022-11-17 PROCEDURE — G8417 CALC BMI ABV UP PARAM F/U: HCPCS | Performed by: INTERNAL MEDICINE

## 2022-11-17 RX ORDER — LISINOPRIL AND HYDROCHLOROTHIAZIDE 12.5; 1 MG/1; MG/1
1 TABLET ORAL NIGHTLY
Qty: 90 TABLET | Refills: 1 | Status: SHIPPED | OUTPATIENT
Start: 2022-11-17

## 2022-11-17 SDOH — ECONOMIC STABILITY: FOOD INSECURITY: WITHIN THE PAST 12 MONTHS, THE FOOD YOU BOUGHT JUST DIDN'T LAST AND YOU DIDN'T HAVE MONEY TO GET MORE.: NEVER TRUE

## 2022-11-17 SDOH — ECONOMIC STABILITY: FOOD INSECURITY: WITHIN THE PAST 12 MONTHS, YOU WORRIED THAT YOUR FOOD WOULD RUN OUT BEFORE YOU GOT MONEY TO BUY MORE.: NEVER TRUE

## 2022-11-17 ASSESSMENT — SOCIAL DETERMINANTS OF HEALTH (SDOH): HOW HARD IS IT FOR YOU TO PAY FOR THE VERY BASICS LIKE FOOD, HOUSING, MEDICAL CARE, AND HEATING?: NOT HARD AT ALL

## 2022-11-17 NOTE — PROGRESS NOTES
[address] [place]     22  Erma Rai : 1970 Sex: male  Age: 45 y.o. Chief Complaint   Patient presents with    Hypertension     4 months       HPI    Rajni Brock presents today for 4-month follow-up visit on his medical problems. I looked over my last note. He has had colonoscopy performed 3 months ago demonstrating a tubular adenoma polyp and need to reschedule colonoscopy in 3 years. I have seen him for an acute visit also for what looks like a viral gastroenteritis. His weight is about the same and is morbidly obese. Tells me blood pressures have been stable controlled on his current antihypertensive medication. I did review his last labs including his marginal vitamin B12 level. He states he is taking it every day. He has had gastric bypass in the past and I am wondering about absorption. Sleep apnea has been controlled on his BiPAP. Review of Systems    Constitutional:Negative for fatigue, activity change, appetite change, chills, diaphoresis, fever and unexpected weight change. Morbidly obese   HENT: Negative for congestion, ear pain, hearing loss, postnasal drip, rhinorrhea and sinus pain. Respiratory: Negative for cough, shortness of breath and wheezing. Cardiovascular: Negative for chest pain, palpitations . Gastrointestinal: Negative for abdominal pain, blood in stool, constipation, diarrhea, nausea and vomiting. Endocrine: Negative. Genitourinary: Negative for difficulty urinating, dysuria, frequency, hematuria and urgency. Musculoskeletal: Negative for arthralgias, back pain, gait problem and myalgias. Skin:    Denies rashes pruritus or skin lesions. Allergic/Immunologic: Negative for environmental allergies and immunocompromised state. Neurological: Negative for dizziness, weakness, light-headedness, numbness and headaches.   Hematological: Formally was anticoagulated for DVT is now finished  Psychiatric/Behavioral: The patient is not nervous/anxious. Uses BiPAP with success            REST OF PERTINENT ROS GONE OVER AND WAS NEGATIVE. PMH:  Health Maintenance:  Colonoscopy - (2017), -  EGD - 8/10,-  2D ECHO - 8/10  EKG -   Psa Test -   Medical Problems:  Obesity, 3 abd hernias repaired, right knee arthroplasty, Rih  Sleep Apnea - with bipap  gastric bypass -   Vitamin B12 deficiency, Iron deficiency  Type 2 Diabetes - Prior to gastric bypass  Gout, Colon Polyps, Fatty Liver, Diverticulosi  Pulmonary nodules-followed and stable  Covid--     FH: Mother's side,\" a lot of cancer\". Father:  Heart Disease  MI - 1st age 40  Colon Cancer  Renal failure - Dialysis- age 66. Mother:  Ovarian Cancer, Breast Cancer. Brother 1:  Heart Disease  MI - age 54. Brother 2:  None. Sister 1:  Fibromyalgia. Maternal Uncle 1 :  brain cancer. Uncle 1 :  brain cancer. SH:  . 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 Alcohol: Current Alcohol  Use - Admits to 12 pack per week.                Current Outpatient Medications:     lisinopril-hydroCHLOROthiazide (PRINZIDE;ZESTORETIC) 10-12.5 MG per tablet, Take 1 tablet by mouth nightly 1 PILL BY MOUTH DAILY, Disp: 90 tablet, Rfl: 1    fluticasone (FLONASE) 50 MCG/ACT nasal spray, 1 spray by Nasal route daily, Disp: 3 each, Rfl: 1    VITAMIN D PO, Take by mouth, Disp: , Rfl:     vitamin B-12 (CYANOCOBALAMIN) 1000 MCG tablet, Take 1,000 mcg by mouth daily, Disp: , Rfl:     ferrous sulfate 325 (65 Fe) MG tablet, Take 325 mg by mouth daily (with breakfast), Disp: , Rfl:   No Known Allergies    Past Medical History:   Diagnosis Date    Ankle pain     Chronic back pain     Chronic knee pain     Colon polyps     Diverticulitis     Dizzy spells     Essential hypertension 2019    Fatty liver     Foot pain     Gout     occaisional flareups    Hyperlipidemia     Hypertension     Iron deficiency     Obesity     Routine chest x-ray     Unspecified sleep apnea     Vitamin B12 deficiency     Vitamin D deficiency 11/10/2010     Past Surgical History:   Procedure Laterality Date    COLONOSCOPY  07/20/2017    COLONOSCOPY N/A 8/5/2019    COLONOSCOPY POLYPECTOMY SNARE/COLD BIOPSY performed by Jermaine Wang MD at [address] N/A 8/29/2022    COLONOSCOPY POLYPECTOMY SNARE/COLD BIOPSY performed by Jermaine Wang MD at [facility]    Right knee    LEG SURGERY  1975    Right leg    ABIMAEL-EN-Y GASTRIC BYPASS  09/27/2010    Dr. Isauro Ayala  1994/1995/2003    x3     Family History   Problem Relation Age of Onset    High Blood Pressure Father     High Cholesterol Father     Heart Disease Father     Heart Attack Father     Colon Cancer Father     Other Father         Renal failure    Heart Attack Brother     Heart Disease Brother     Cancer Mother         Ovarian    Breast Cancer Mother     Other Sister         Fibromyalgia    Cancer Maternal Uncle         Brain     Social History     Socioeconomic History    Marital status:      Spouse name: Not on file    Number of children: Not on file    Years of education: Not on file    Highest education level: Not on file   Occupational History    Not on file   Tobacco Use    Smoking status: Never    Smokeless tobacco: Current     Types: Snuff   Vaping Use    Vaping Use: Never used   Substance and Sexual Activity    Alcohol use: Yes     Comment: moderately- weekends only    Drug use: No    Sexual activity: Yes     Partners: Female   Other Topics Concern    Not on file   Social History Narrative    Not on file     Social Determinants of Health     Financial Resource Strain: Low Risk     Difficulty of Paying Living Expenses: Not hard at all   Food Insecurity: No Food Insecurity    Worried About Running Out of Food in the Last Year: Never true    [address] in the Last Year: Never true Transportation Needs: Not on file   Physical Activity: Not on file   Stress: Not on file   Social Connections: Not on file   Intimate Partner Violence: Not on file   Housing Stability: Not on file       Vitals:    11/17/22 0729   BP: 130/80   Pulse: 93   Temp: 97.7 °F (36.5 °C)   TempSrc: Temporal   SpO2: 97%   Weight: (!) 378 lb 6.4 oz (171.6 kg)   Height: 6' (1.829 m)       Physical Exam    Const: Appears well developed and well nourished. Appears morbidly obese. No signs of acute distress  present. Neck: Supple and symmetric. Palpation reveals no adenopathy. No masses appreciated. Thyroid  exhibits no nodule or thyromegaly. No JVD. Carotids: 2+ and equal bilaterally, without bruits. Resp: No rales, rhonchi or wheezes appreciated over the lungs bilaterally. CV: Rate is regular. Rhythm is regular. S1 is normal. S2 is normal. No gallop or rubs. No heart  murmur appreciated. Extremities: Edema, but no clubbing or cyanosis  Abdomen: Bowel sounds are normoactive/obese Palpation reveals softness, with no distension,  organomegaly or tenderness. No abdominal masses palpable/upper abdominal midline hernia. No  palpable hepatosplenomegaly. Musculoskeletal: Trace bilateral lower extremity edema with right greater than left. Psych: Patient's attitude is cooperative. Patient's affect is appropriate. Judgement is realistic. Insight  is appropriate             Assessment and Plan:  Winston Samuels was seen today for hypertension. Diagnoses and all orders for this visit:    Essential hypertension  -     lisinopril-hydroCHLOROthiazide (PRINZIDE;ZESTORETIC) 10-12.5 MG per tablet; Take 1 tablet by mouth nightly 1 PILL BY MOUTH DAILY  -     CBC with Auto Differential; Future  -     Comprehensive Metabolic Panel;  Future    Vitamin B 12 deficiency  -     Vitamin B12; Future    Class 3 severe obesity due to excess calories with body mass index (BMI) of 45.0 to 49.9 in adult, unspecified whether serious comorbidity present [facility])    Plan: We will set him up with new physician in 4 months to establish and follow. We will get blood work in the next week to monitor disease progression and medication use. I did again offer him weight management referral which she declined. Continue to monitor blood pressure closely. Notify us of problems in the interim. Return in about 4 months (around 3/17/2023). Seen By:  Sagar Bellamy MD      *Document was created using voice recognition software. Note was reviewed however may contain grammatical errors.

## 2022-11-25 DIAGNOSIS — E53.8 VITAMIN B 12 DEFICIENCY: ICD-10-CM

## 2022-11-25 DIAGNOSIS — I10 ESSENTIAL HYPERTENSION: ICD-10-CM

## 2022-11-25 LAB
ALBUMIN SERPL-MCNC: 3.8 G/DL (ref 3.5–5.2)
ALP BLD-CCNC: 69 U/L (ref 40–129)
ALT SERPL-CCNC: 10 U/L (ref 0–40)
ANION GAP SERPL CALCULATED.3IONS-SCNC: 11 MMOL/L (ref 7–16)
AST SERPL-CCNC: 18 U/L (ref 0–39)
BASOPHILS ABSOLUTE: 0.06 E9/L (ref 0–0.2)
BASOPHILS RELATIVE PERCENT: 0.7 % (ref 0–2)
BILIRUB SERPL-MCNC: 0.3 MG/DL (ref 0–1.2)
BUN BLDV-MCNC: 13 MG/DL (ref 6–20)
CALCIUM SERPL-MCNC: 9 MG/DL (ref 8.6–10.2)
CHLORIDE BLD-SCNC: 106 MMOL/L (ref 98–107)
CO2: 25 MMOL/L (ref 22–29)
CREAT SERPL-MCNC: 1 MG/DL (ref 0.7–1.2)
EOSINOPHILS ABSOLUTE: 0.13 E9/L (ref 0.05–0.5)
EOSINOPHILS RELATIVE PERCENT: 1.4 % (ref 0–6)
GFR SERPL CREATININE-BSD FRML MDRD: >60 ML/MIN/1.73
GLUCOSE BLD-MCNC: 53 MG/DL (ref 74–99)
HCT VFR BLD CALC: 53 % (ref 37–54)
HEMOGLOBIN: 16.9 G/DL (ref 12.5–16.5)
IMMATURE GRANULOCYTES #: 0.04 E9/L
IMMATURE GRANULOCYTES %: 0.4 % (ref 0–5)
LYMPHOCYTES ABSOLUTE: 1.99 E9/L (ref 1.5–4)
LYMPHOCYTES RELATIVE PERCENT: 22 % (ref 20–42)
MCH RBC QN AUTO: 30.4 PG (ref 26–35)
MCHC RBC AUTO-ENTMCNC: 31.9 % (ref 32–34.5)
MCV RBC AUTO: 95.3 FL (ref 80–99.9)
MONOCYTES ABSOLUTE: 0.91 E9/L (ref 0.1–0.95)
MONOCYTES RELATIVE PERCENT: 10.1 % (ref 2–12)
NEUTROPHILS ABSOLUTE: 5.9 E9/L (ref 1.8–7.3)
NEUTROPHILS RELATIVE PERCENT: 65.4 % (ref 43–80)
PDW BLD-RTO: 13.2 FL (ref 11.5–15)
PLATELET # BLD: 202 E9/L (ref 130–450)
PMV BLD AUTO: 10 FL (ref 7–12)
POTASSIUM SERPL-SCNC: 4.1 MMOL/L (ref 3.5–5)
RBC # BLD: 5.56 E12/L (ref 3.8–5.8)
SODIUM BLD-SCNC: 142 MMOL/L (ref 132–146)
TOTAL PROTEIN: 7 G/DL (ref 6.4–8.3)
VITAMIN B-12: 279 PG/ML (ref 211–946)
WBC # BLD: 9 E9/L (ref 4.5–11.5)

## 2022-11-27 ENCOUNTER — TELEPHONE (OUTPATIENT)
Dept: FAMILY MEDICINE CLINIC | Age: 52
End: 2022-11-27

## 2022-11-27 NOTE — TELEPHONE ENCOUNTER
Patient's B12 level remains on the low side. I do not believe he is absorbing oral B12 properly because of his history of gastric bypass. He should start receiving monthly B12 injections 1000 mcg and have level repeated by his new physician when seen. also remains slightly polycythemic. Please check and see if patient is a smoker and if he is using his BiPAP on a regular basis.   Please let me know

## 2023-02-13 NOTE — PROGRESS NOTES
Admitted to Same Day Surgery Unit. Preop instructions given to patient & family. What Type Of Note Output Would You Prefer (Optional)?: Bullet Format How Severe Are Your Spot(S)?: mild Have Your Spot(S) Been Treated In The Past?: has not been treated Hpi Title: Evaluation of Skin Lesions

## 2023-03-30 ENCOUNTER — OFFICE VISIT (OUTPATIENT)
Dept: FAMILY MEDICINE CLINIC | Age: 53
End: 2023-03-30
Payer: MEDICAID

## 2023-03-30 VITALS
BODY MASS INDEX: 42.66 KG/M2 | TEMPERATURE: 97.5 F | WEIGHT: 315 LBS | SYSTOLIC BLOOD PRESSURE: 126 MMHG | HEART RATE: 72 BPM | OXYGEN SATURATION: 97 % | HEIGHT: 72 IN | DIASTOLIC BLOOD PRESSURE: 84 MMHG

## 2023-03-30 DIAGNOSIS — E66.01 CLASS 3 SEVERE OBESITY DUE TO EXCESS CALORIES WITH BODY MASS INDEX (BMI) OF 45.0 TO 49.9 IN ADULT, UNSPECIFIED WHETHER SERIOUS COMORBIDITY PRESENT (HCC): Primary | ICD-10-CM

## 2023-03-30 DIAGNOSIS — G47.33 OBSTRUCTIVE SLEEP APNEA: ICD-10-CM

## 2023-03-30 DIAGNOSIS — I10 ESSENTIAL HYPERTENSION: ICD-10-CM

## 2023-03-30 DIAGNOSIS — Z98.84 HX OF GASTRIC BYPASS: ICD-10-CM

## 2023-03-30 PROCEDURE — 99214 OFFICE O/P EST MOD 30 MIN: CPT | Performed by: FAMILY MEDICINE

## 2023-03-30 PROCEDURE — 3074F SYST BP LT 130 MM HG: CPT | Performed by: FAMILY MEDICINE

## 2023-03-30 PROCEDURE — G8427 DOCREV CUR MEDS BY ELIG CLIN: HCPCS | Performed by: FAMILY MEDICINE

## 2023-03-30 PROCEDURE — G8484 FLU IMMUNIZE NO ADMIN: HCPCS | Performed by: FAMILY MEDICINE

## 2023-03-30 PROCEDURE — 3017F COLORECTAL CA SCREEN DOC REV: CPT | Performed by: FAMILY MEDICINE

## 2023-03-30 PROCEDURE — 3079F DIAST BP 80-89 MM HG: CPT | Performed by: FAMILY MEDICINE

## 2023-03-30 PROCEDURE — G8417 CALC BMI ABV UP PARAM F/U: HCPCS | Performed by: FAMILY MEDICINE

## 2023-03-30 PROCEDURE — 4004F PT TOBACCO SCREEN RCVD TLK: CPT | Performed by: FAMILY MEDICINE

## 2023-03-30 RX ORDER — LISINOPRIL AND HYDROCHLOROTHIAZIDE 12.5; 1 MG/1; MG/1
1 TABLET ORAL NIGHTLY
Qty: 90 TABLET | Refills: 1 | Status: CANCELLED | OUTPATIENT
Start: 2023-03-30

## 2023-03-30 RX ORDER — FLUTICASONE PROPIONATE 50 MCG
1 SPRAY, SUSPENSION (ML) NASAL DAILY
Qty: 3 EACH | Refills: 1 | Status: SHIPPED | OUTPATIENT
Start: 2023-03-30 | End: 2023-04-29

## 2023-03-30 SDOH — ECONOMIC STABILITY: FOOD INSECURITY: WITHIN THE PAST 12 MONTHS, THE FOOD YOU BOUGHT JUST DIDN'T LAST AND YOU DIDN'T HAVE MONEY TO GET MORE.: NEVER TRUE

## 2023-03-30 SDOH — ECONOMIC STABILITY: INCOME INSECURITY: HOW HARD IS IT FOR YOU TO PAY FOR THE VERY BASICS LIKE FOOD, HOUSING, MEDICAL CARE, AND HEATING?: NOT HARD AT ALL

## 2023-03-30 SDOH — ECONOMIC STABILITY: FOOD INSECURITY: WITHIN THE PAST 12 MONTHS, YOU WORRIED THAT YOUR FOOD WOULD RUN OUT BEFORE YOU GOT MONEY TO BUY MORE.: NEVER TRUE

## 2023-03-30 SDOH — ECONOMIC STABILITY: HOUSING INSECURITY
IN THE LAST 12 MONTHS, WAS THERE A TIME WHEN YOU DID NOT HAVE A STEADY PLACE TO SLEEP OR SLEPT IN A SHELTER (INCLUDING NOW)?: NO

## 2023-03-30 ASSESSMENT — PATIENT HEALTH QUESTIONNAIRE - PHQ9
SUM OF ALL RESPONSES TO PHQ QUESTIONS 1-9: 0
SUM OF ALL RESPONSES TO PHQ QUESTIONS 1-9: 0
2. FEELING DOWN, DEPRESSED OR HOPELESS: 0
SUM OF ALL RESPONSES TO PHQ QUESTIONS 1-9: 0
1. LITTLE INTEREST OR PLEASURE IN DOING THINGS: 0
SUM OF ALL RESPONSES TO PHQ QUESTIONS 1-9: 0
SUM OF ALL RESPONSES TO PHQ9 QUESTIONS 1 & 2: 0

## 2023-03-30 ASSESSMENT — ENCOUNTER SYMPTOMS
RHINORRHEA: 0
COLOR CHANGE: 0
VOMITING: 0
SHORTNESS OF BREATH: 0
FACIAL SWELLING: 0
CHEST TIGHTNESS: 0
SINUS PAIN: 0
APNEA: 0
PHOTOPHOBIA: 0
BLOOD IN STOOL: 0
ABDOMINAL DISTENTION: 0
DIARRHEA: 0
SINUS PRESSURE: 0
CONSTIPATION: 0
COUGH: 0

## 2023-03-30 NOTE — PROGRESS NOTES
allergies and food allergies. Neurological:  Negative for dizziness, light-headedness and headaches. Hematological:  Negative for adenopathy. Psychiatric/Behavioral:  Negative for agitation and confusion. Physical Exam  Constitutional:       Appearance: He is obese. HENT:      Head: Normocephalic and atraumatic. Right Ear: Tympanic membrane and ear canal normal. There is no impacted cerumen. Left Ear: Tympanic membrane and ear canal normal. There is no impacted cerumen. Nose: Nose normal. No congestion or rhinorrhea. Eyes:      Extraocular Movements: Extraocular movements intact. Pupils: Pupils are equal, round, and reactive to light. Cardiovascular:      Rate and Rhythm: Normal rate and regular rhythm. Heart sounds: No murmur heard. No friction rub. No gallop. Pulmonary:      Effort: Pulmonary effort is normal.      Breath sounds: Normal breath sounds. Chest:      Chest wall: No tenderness. Abdominal:      General: Abdomen is flat. Bowel sounds are normal. There is no distension. Palpations: Abdomen is soft. Tenderness: There is no abdominal tenderness. Musculoskeletal:         General: Normal range of motion. Cervical back: Normal range of motion. Skin:     General: Skin is warm and dry. Neurological:      General: No focal deficit present. Mental Status: He is alert and oriented to person, place, and time. Psychiatric:         Mood and Affect: Mood normal.       Assessment:  Katharine Koroma was seen today for hypertension. Diagnoses and all orders for this visit:    Class 3 severe obesity due to excess calories with body mass index (BMI) of 45.0 to 49.9 in adult, unspecified whether serious comorbidity present (HCC)  -     Vitamin D 25 Hydroxy; Future  -     Lipid Panel; Future    Essential hypertension  -     Comprehensive Metabolic Panel;  Future  -     CBC with Auto Differential; Future    Obstructive sleep apnea    Hx of gastric bypass  -

## 2023-07-24 ENCOUNTER — OFFICE VISIT (OUTPATIENT)
Dept: FAMILY MEDICINE CLINIC | Age: 53
End: 2023-07-24
Payer: MEDICAID

## 2023-07-24 VITALS
WEIGHT: 315 LBS | DIASTOLIC BLOOD PRESSURE: 86 MMHG | OXYGEN SATURATION: 96 % | SYSTOLIC BLOOD PRESSURE: 128 MMHG | HEIGHT: 72 IN | TEMPERATURE: 97.8 F | RESPIRATION RATE: 18 BRPM | BODY MASS INDEX: 42.66 KG/M2 | HEART RATE: 58 BPM

## 2023-07-24 DIAGNOSIS — D36.7 DERMOID CYST OF HEAD: Primary | ICD-10-CM

## 2023-07-24 PROCEDURE — 3017F COLORECTAL CA SCREEN DOC REV: CPT | Performed by: STUDENT IN AN ORGANIZED HEALTH CARE EDUCATION/TRAINING PROGRAM

## 2023-07-24 PROCEDURE — 3079F DIAST BP 80-89 MM HG: CPT | Performed by: STUDENT IN AN ORGANIZED HEALTH CARE EDUCATION/TRAINING PROGRAM

## 2023-07-24 PROCEDURE — G8427 DOCREV CUR MEDS BY ELIG CLIN: HCPCS | Performed by: STUDENT IN AN ORGANIZED HEALTH CARE EDUCATION/TRAINING PROGRAM

## 2023-07-24 PROCEDURE — G8417 CALC BMI ABV UP PARAM F/U: HCPCS | Performed by: STUDENT IN AN ORGANIZED HEALTH CARE EDUCATION/TRAINING PROGRAM

## 2023-07-24 PROCEDURE — 4004F PT TOBACCO SCREEN RCVD TLK: CPT | Performed by: STUDENT IN AN ORGANIZED HEALTH CARE EDUCATION/TRAINING PROGRAM

## 2023-07-24 PROCEDURE — 99213 OFFICE O/P EST LOW 20 MIN: CPT | Performed by: STUDENT IN AN ORGANIZED HEALTH CARE EDUCATION/TRAINING PROGRAM

## 2023-07-24 PROCEDURE — 3074F SYST BP LT 130 MM HG: CPT | Performed by: STUDENT IN AN ORGANIZED HEALTH CARE EDUCATION/TRAINING PROGRAM

## 2023-07-24 RX ORDER — CEPHALEXIN 500 MG/1
500 CAPSULE ORAL 3 TIMES DAILY
Qty: 21 CAPSULE | Refills: 0 | Status: SHIPPED | OUTPATIENT
Start: 2023-07-24 | End: 2023-07-31

## 2023-07-24 ASSESSMENT — ENCOUNTER SYMPTOMS
NAUSEA: 0
SHORTNESS OF BREATH: 0
RHINORRHEA: 0
VOMITING: 0
ABDOMINAL PAIN: 0
COUGH: 0

## 2023-07-24 NOTE — PROGRESS NOTES
Lorenzo Gregory (:  1970) is a 52 y.o. male,Established patient, here for evaluation of the following chief complaint(s): Mass (Back of head lower right side- possible in grown hair there before /Denies pain- states it feels like it wants to pop)         ASSESSMENT/PLAN:  1. Dermoid cyst of head  -     cephALEXin (KEFLEX) 500 MG capsule; Take 1 capsule by mouth 3 times daily for 7 days, Disp-21 capsule, R-0Normal  Cyst is small, considered lancing it but it would be difficult to  dress and it is small enough for antibiotics to address, let me know or return through express if worsening/not improving    Return if symptoms worsen or fail to improve. Subjective   SUBJECTIVE/OBJECTIVE:  Cyst on the back of head. Going on 7-10 days  Occasionally a small amount of pus will come out of it  Non painful  There is no fever  Has no taken anything OTC at home  Has gotten in grown hairs in the past  Has been trying to massage it        Review of Systems   Constitutional:  Negative for chills and fever. HENT:  Negative for congestion and rhinorrhea. Respiratory:  Negative for cough and shortness of breath. Cardiovascular:  Negative for chest pain and leg swelling. Gastrointestinal:  Negative for abdominal pain, nausea and vomiting. Genitourinary:  Negative for dysuria and hematuria. Musculoskeletal:  Negative for arthralgias and myalgias. Skin:  Negative for rash and wound. Neurological:  Negative for dizziness and light-headedness. Objective   Physical Exam  Vitals reviewed. Constitutional:       General: He is not in acute distress. HENT:      Head: Normocephalic and atraumatic. Eyes:      Extraocular Movements: Extraocular movements intact. Conjunctiva/sclera: Conjunctivae normal.   Cardiovascular:      Rate and Rhythm: Normal rate. Pulmonary:      Effort: Pulmonary effort is normal. No respiratory distress. Neurological:      General: No focal deficit present.

## 2023-07-28 ENCOUNTER — TELEPHONE (OUTPATIENT)
Dept: FAMILY MEDICINE CLINIC | Age: 53
End: 2023-07-28

## 2023-07-28 NOTE — TELEPHONE ENCOUNTER
Called and spoke to pt, cyst is no better and was told to call back and let you know if its not improved. Its not getting any bigger but definitely not getting any smaller.

## 2023-08-02 ENCOUNTER — OFFICE VISIT (OUTPATIENT)
Dept: FAMILY MEDICINE CLINIC | Age: 53
End: 2023-08-02
Payer: MEDICAID

## 2023-08-02 VITALS
WEIGHT: 315 LBS | BODY MASS INDEX: 42.66 KG/M2 | TEMPERATURE: 97.6 F | SYSTOLIC BLOOD PRESSURE: 130 MMHG | RESPIRATION RATE: 18 BRPM | OXYGEN SATURATION: 98 % | DIASTOLIC BLOOD PRESSURE: 72 MMHG | HEIGHT: 72 IN | HEART RATE: 74 BPM

## 2023-08-02 DIAGNOSIS — L72.3 SEBACEOUS CYST: Primary | ICD-10-CM

## 2023-08-02 PROCEDURE — G8427 DOCREV CUR MEDS BY ELIG CLIN: HCPCS | Performed by: FAMILY MEDICINE

## 2023-08-02 PROCEDURE — 3017F COLORECTAL CA SCREEN DOC REV: CPT | Performed by: FAMILY MEDICINE

## 2023-08-02 PROCEDURE — 3075F SYST BP GE 130 - 139MM HG: CPT | Performed by: FAMILY MEDICINE

## 2023-08-02 PROCEDURE — G8417 CALC BMI ABV UP PARAM F/U: HCPCS | Performed by: FAMILY MEDICINE

## 2023-08-02 PROCEDURE — 99212 OFFICE O/P EST SF 10 MIN: CPT | Performed by: FAMILY MEDICINE

## 2023-08-02 PROCEDURE — 3078F DIAST BP <80 MM HG: CPT | Performed by: FAMILY MEDICINE

## 2023-08-02 PROCEDURE — 4004F PT TOBACCO SCREEN RCVD TLK: CPT | Performed by: FAMILY MEDICINE

## 2023-08-02 PROCEDURE — 10060 I&D ABSCESS SIMPLE/SINGLE: CPT | Performed by: FAMILY MEDICINE

## 2023-08-02 NOTE — TELEPHONE ENCOUNTER
Last Appointment:  3/30/2023  Future Appointments   Date Time Provider [address]   9/18/2023 11:45 AM Margaret Naidu  [facility]

## 2023-08-02 NOTE — PROGRESS NOTES
bandaid. Will finish Keflex      No follow-ups on file.     Electronically signed by Jocelyn Conde MD on 8/2/23 at 9:45 AM EDT

## 2023-08-03 RX ORDER — FLUTICASONE PROPIONATE 50 MCG
1 SPRAY, SUSPENSION (ML) NASAL DAILY
Qty: 3 EACH | Refills: 1 | Status: SHIPPED | OUTPATIENT
Start: 2023-08-03 | End: 2023-09-02

## 2023-09-18 ENCOUNTER — OFFICE VISIT (OUTPATIENT)
Dept: FAMILY MEDICINE CLINIC | Age: 53
End: 2023-09-18
Payer: MEDICAID

## 2023-09-18 VITALS
DIASTOLIC BLOOD PRESSURE: 70 MMHG | OXYGEN SATURATION: 96 % | HEART RATE: 60 BPM | TEMPERATURE: 97.4 F | HEIGHT: 72 IN | SYSTOLIC BLOOD PRESSURE: 114 MMHG | WEIGHT: 315 LBS | BODY MASS INDEX: 42.66 KG/M2

## 2023-09-18 DIAGNOSIS — I10 ESSENTIAL HYPERTENSION: ICD-10-CM

## 2023-09-18 DIAGNOSIS — M25.562 ACUTE PAIN OF LEFT KNEE: ICD-10-CM

## 2023-09-18 DIAGNOSIS — E66.01 CLASS 3 SEVERE OBESITY DUE TO EXCESS CALORIES WITH BODY MASS INDEX (BMI) OF 45.0 TO 49.9 IN ADULT, UNSPECIFIED WHETHER SERIOUS COMORBIDITY PRESENT (HCC): ICD-10-CM

## 2023-09-18 DIAGNOSIS — E66.01 CLASS 3 SEVERE OBESITY DUE TO EXCESS CALORIES WITH BODY MASS INDEX (BMI) OF 45.0 TO 49.9 IN ADULT, UNSPECIFIED WHETHER SERIOUS COMORBIDITY PRESENT (HCC): Primary | ICD-10-CM

## 2023-09-18 LAB
ABSOLUTE IMMATURE GRANULOCYTE: 0.05 K/UL (ref 0–0.58)
ALBUMIN SERPL-MCNC: 4.1 G/DL (ref 3.5–5.2)
ALP BLD-CCNC: 69 U/L (ref 40–129)
ALT SERPL-CCNC: 15 U/L (ref 0–40)
ANION GAP SERPL CALCULATED.3IONS-SCNC: 12 MMOL/L (ref 7–16)
AST SERPL-CCNC: 24 U/L (ref 0–39)
BASOPHILS ABSOLUTE: 0.04 K/UL (ref 0–0.2)
BASOPHILS RELATIVE PERCENT: 1 % (ref 0–2)
BILIRUB SERPL-MCNC: 0.5 MG/DL (ref 0–1.2)
BUN BLDV-MCNC: 12 MG/DL (ref 6–20)
CALCIUM SERPL-MCNC: 9.2 MG/DL (ref 8.6–10.2)
CHLORIDE BLD-SCNC: 107 MMOL/L (ref 98–107)
CHOLESTEROL: 154 MG/DL
CO2: 24 MMOL/L (ref 22–29)
CREAT SERPL-MCNC: 1 MG/DL (ref 0.7–1.2)
EOSINOPHILS ABSOLUTE: 0.1 K/UL (ref 0.05–0.5)
EOSINOPHILS RELATIVE PERCENT: 1 % (ref 0–6)
GFR SERPL CREATININE-BSD FRML MDRD: >60 ML/MIN/1.73M2
GLUCOSE BLD-MCNC: 92 MG/DL (ref 74–99)
HCT VFR BLD CALC: 54.1 % (ref 37–54)
HDLC SERPL-MCNC: 36 MG/DL
HEMOGLOBIN: 17.5 G/DL (ref 12.5–16.5)
IMMATURE GRANULOCYTES: 1 % (ref 0–5)
LDL CHOLESTEROL: 93 MG/DL
LYMPHOCYTES ABSOLUTE: 1.91 K/UL (ref 1.5–4)
LYMPHOCYTES RELATIVE PERCENT: 24 % (ref 20–42)
MCH RBC QN AUTO: 30.9 PG (ref 26–35)
MCHC RBC AUTO-ENTMCNC: 32.3 G/DL (ref 32–34.5)
MCV RBC AUTO: 95.4 FL (ref 80–99.9)
MONOCYTES ABSOLUTE: 0.67 K/UL (ref 0.1–0.95)
MONOCYTES RELATIVE PERCENT: 8 % (ref 2–12)
NEUTROPHILS ABSOLUTE: 5.21 K/UL (ref 1.8–7.3)
NEUTROPHILS RELATIVE PERCENT: 65 % (ref 43–80)
PDW BLD-RTO: 13.3 % (ref 11.5–15)
PLATELET # BLD: 201 K/UL (ref 130–450)
PMV BLD AUTO: 10.5 FL (ref 7–12)
POTASSIUM SERPL-SCNC: 4.2 MMOL/L (ref 3.5–5)
RBC # BLD: 5.67 M/UL (ref 3.8–5.8)
SODIUM BLD-SCNC: 143 MMOL/L (ref 132–146)
TOTAL PROTEIN: 7.1 G/DL (ref 6.4–8.3)
TRIGL SERPL-MCNC: 127 MG/DL
VLDLC SERPL CALC-MCNC: 25 MG/DL
WBC # BLD: 8 K/UL (ref 4.5–11.5)

## 2023-09-18 PROCEDURE — G8417 CALC BMI ABV UP PARAM F/U: HCPCS | Performed by: FAMILY MEDICINE

## 2023-09-18 PROCEDURE — 99214 OFFICE O/P EST MOD 30 MIN: CPT | Performed by: FAMILY MEDICINE

## 2023-09-18 PROCEDURE — 3074F SYST BP LT 130 MM HG: CPT | Performed by: FAMILY MEDICINE

## 2023-09-18 PROCEDURE — G8427 DOCREV CUR MEDS BY ELIG CLIN: HCPCS | Performed by: FAMILY MEDICINE

## 2023-09-18 PROCEDURE — 4004F PT TOBACCO SCREEN RCVD TLK: CPT | Performed by: FAMILY MEDICINE

## 2023-09-18 PROCEDURE — 3078F DIAST BP <80 MM HG: CPT | Performed by: FAMILY MEDICINE

## 2023-09-18 PROCEDURE — 3017F COLORECTAL CA SCREEN DOC REV: CPT | Performed by: FAMILY MEDICINE

## 2023-09-18 RX ORDER — LISINOPRIL AND HYDROCHLOROTHIAZIDE 12.5; 1 MG/1; MG/1
1 TABLET ORAL NIGHTLY
Qty: 90 TABLET | Refills: 1 | Status: SHIPPED | OUTPATIENT
Start: 2023-09-18

## 2023-09-18 ASSESSMENT — ENCOUNTER SYMPTOMS
BLOOD IN STOOL: 0
SINUS PAIN: 0
VOMITING: 0
COLOR CHANGE: 0
COUGH: 0
CONSTIPATION: 0
SINUS PRESSURE: 0
PHOTOPHOBIA: 0
CHEST TIGHTNESS: 0
SHORTNESS OF BREATH: 0
FACIAL SWELLING: 0
ABDOMINAL DISTENTION: 0
APNEA: 0
DIARRHEA: 0
RHINORRHEA: 0

## 2023-09-25 ENCOUNTER — TELEPHONE (OUTPATIENT)
Dept: FAMILY MEDICINE CLINIC | Age: 53
End: 2023-09-25

## 2023-09-25 DIAGNOSIS — M25.562 ACUTE PAIN OF LEFT KNEE: Primary | ICD-10-CM

## 2023-09-25 NOTE — TELEPHONE ENCOUNTER
Rosa Davalos is asking about the pain in the left knee. He was told by someone here that if it got too bad, he could be referred to pain management. He is now agreeable to that, and hopes to be seen soon.

## 2023-09-26 NOTE — TELEPHONE ENCOUNTER
I think there may have been a misunderstanding. He should go to Orthopedics. Not pain management. Is he agreeable to that?

## 2023-10-05 ENCOUNTER — OFFICE VISIT (OUTPATIENT)
Dept: ORTHOPEDIC SURGERY | Age: 53
End: 2023-10-05

## 2023-10-05 VITALS — BODY MASS INDEX: 42.66 KG/M2 | WEIGHT: 315 LBS | HEIGHT: 72 IN

## 2023-10-05 DIAGNOSIS — M17.12 PRIMARY OSTEOARTHRITIS OF LEFT KNEE: Primary | ICD-10-CM

## 2023-10-05 RX ORDER — MELOXICAM 7.5 MG/1
7.5 TABLET ORAL DAILY
Qty: 30 TABLET | Refills: 0 | Status: SHIPPED | OUTPATIENT
Start: 2023-10-05

## 2023-10-05 RX ORDER — TRIAMCINOLONE ACETONIDE 40 MG/ML
80 INJECTION, SUSPENSION INTRA-ARTICULAR; INTRAMUSCULAR ONCE
Status: COMPLETED | OUTPATIENT
Start: 2023-10-05 | End: 2023-10-05

## 2023-10-05 RX ORDER — BUPIVACAINE HYDROCHLORIDE 2.5 MG/ML
3 INJECTION, SOLUTION INFILTRATION; PERINEURAL ONCE
Status: COMPLETED | OUTPATIENT
Start: 2023-10-05 | End: 2023-10-05

## 2023-10-05 RX ADMIN — TRIAMCINOLONE ACETONIDE 80 MG: 40 INJECTION, SUSPENSION INTRA-ARTICULAR; INTRAMUSCULAR at 08:30

## 2023-10-05 RX ADMIN — BUPIVACAINE HYDROCHLORIDE 7.5 MG: 2.5 INJECTION, SOLUTION INFILTRATION; PERINEURAL at 08:30

## 2023-10-05 NOTE — PROGRESS NOTES
New Knee Patient     Referring Provider:   Pola Santiago MD  [address] [facility],  [address]:   Chief Complaint   Patient presents with    New Patient     Left knee pain, denies injury he woke up one day and it was hurting for the past month, he had xrays done at [facility]        HPI:    Mami Hernandez is a 52y.o. year old male with left knee pain. He woke up and it started hurting spontaneously and this has been going on for the past month. He did get x-rays on 9/18/2023 which revealed arthritis. He was referred here for further care. His BMI is 50. He has not had any recent injuries. Denies mechanical symptoms. He has activity related knee pain that is sharp and nonradiating. Pain is 6 out of 10. Is worse going up and down stairs. Denies fevers and chills. Denies numbness tingling.     PAST MEDICAL HISTORY  Past Medical History:   Diagnosis Date    Ankle pain     Chronic back pain     Chronic knee pain     Colon polyps     Diverticulitis     Dizzy spells     Essential hypertension 9/18/2019    Fatty liver     Foot pain     Gout     occaisional flareups    Hyperlipidemia     Hypertension     Iron deficiency     Obesity     Routine chest x-ray     Unspecified sleep apnea     Vitamin B12 deficiency     Vitamin D deficiency 11/10/2010       PAST SURGICAL HISTORY  Past Surgical History:   Procedure Laterality Date    COLONOSCOPY  07/20/2017    COLONOSCOPY N/A 8/5/2019    COLONOSCOPY POLYPECTOMY SNARE/COLD BIOPSY performed by Johanna Hannah MD at [address] N/A 8/29/2022    COLONOSCOPY POLYPECTOMY SNARE/COLD BIOPSY performed by Johanna Hannah MD at [address] [place] [address],[address] [place]    Right knee    LEG SURGERY  1975    Right leg    ABIMAEL-EN-Y GASTRIC BYPASS  09/27/2010    Dr. Shauna Quinn  1994/1995/2003    x3         FAMILY HISTORY   Family History   Problem Relation Age of Onset    High Blood Pressure Father     High Cholesterol

## 2024-01-15 ENCOUNTER — TELEPHONE (OUTPATIENT)
Dept: FAMILY MEDICINE CLINIC | Age: 54
End: 2024-01-15

## 2024-01-15 NOTE — TELEPHONE ENCOUNTER
I am sorry.   Dr. Abdul really should address this.     Thank you,      Triston Humphrey MD  5:04 PM  01/15/24

## 2024-01-15 NOTE — TELEPHONE ENCOUNTER
I would advise that Dr. Abdul orders a brace for the knee, as he would know which ones is better.

## 2024-01-15 NOTE — TELEPHONE ENCOUNTER
José Antonio saw Dr Abdul back in October, and had an injection in the left knee.  This did help with the pain.    He goes back to him for another one on the 18th.      He is asking if you can order a knee brace for the left knee, and send the order to [facility].    He would like to get this today if that is possible.

## 2024-01-15 NOTE — TELEPHONE ENCOUNTER
Advised José Antonio to have Dr Abdul order this for him.  He stated that the problem with that is, Dr Abdul is not available for the next 2 days.     José Antonio has to work the next 2 days and can't get off.  He is asking you so that it can be ordered so he can get it today.

## 2024-01-18 ENCOUNTER — OFFICE VISIT (OUTPATIENT)
Dept: ORTHOPEDIC SURGERY | Age: 54
End: 2024-01-18

## 2024-01-18 DIAGNOSIS — M17.12 PRIMARY OSTEOARTHRITIS OF LEFT KNEE: Primary | ICD-10-CM

## 2024-01-18 RX ORDER — TRIAMCINOLONE ACETONIDE 40 MG/ML
80 INJECTION, SUSPENSION INTRA-ARTICULAR; INTRAMUSCULAR ONCE
Status: COMPLETED | OUTPATIENT
Start: 2024-01-18 | End: 2024-01-18

## 2024-01-18 RX ORDER — BUPIVACAINE HYDROCHLORIDE 2.5 MG/ML
3 INJECTION, SOLUTION INFILTRATION; PERINEURAL ONCE
Status: COMPLETED | OUTPATIENT
Start: 2024-01-18 | End: 2024-01-18

## 2024-01-18 RX ADMIN — TRIAMCINOLONE ACETONIDE 80 MG: 40 INJECTION, SUSPENSION INTRA-ARTICULAR; INTRAMUSCULAR at 08:24

## 2024-01-18 RX ADMIN — BUPIVACAINE HYDROCHLORIDE 7.5 MG: 2.5 INJECTION, SOLUTION INFILTRATION; PERINEURAL at 08:24

## 2024-01-18 NOTE — PROGRESS NOTES
CHIEF COMPLAINT:   Chief Complaint   Patient presents with    Follow-up     Left knee pain, he wants an injection today, his last injection was 10/05/23. He also wants a brace and has had a recent fall where his knee hurt afterwards but he didn't fall directly onto it. He needs a work excuse note with what his limitations are.      HPI update 1/18/2024: José Antonio is here today for left knee pain.  Reduction repeat injection.  He good relief after his injection in October.  He would also like a brace.  He states that he fell onto his hands and knees a few days ago and this has been progressively getting better.  He has had an exacerbation of his knee arthritis with medial sided pain that is worse activity.  He has difficulty with his physical job.    HPI:    José Antonio Lara is a 53 y.o. year old male with left knee pain.  He woke up and it started hurting spontaneously and this has been going on for the past month.  He did get x-rays on 9/18/2023 which revealed arthritis.  He was referred here for further care.  His BMI is 50.  He has not had any recent injuries.  Denies mechanical symptoms.  He has activity related knee pain that is sharp and nonradiating.  Pain is 6 out of 10.  Is worse going up and down stairs.  Denies fevers and chills.  Denies numbness tingling.    PAST MEDICAL HISTORY  Past Medical History:   Diagnosis Date    Ankle pain     Chronic back pain     Chronic knee pain     Colon polyps     Diverticulitis     Dizzy spells     Essential hypertension 9/18/2019    Fatty liver     Foot pain     Gout     occaisional flareups    Hyperlipidemia     Hypertension     Iron deficiency     Obesity     Routine chest x-ray     Unspecified sleep apnea     Vitamin B12 deficiency     Vitamin D deficiency 11/10/2010       PAST SURGICAL HISTORY  Past Surgical History:   Procedure Laterality Date    COLONOSCOPY  07/20/2017    COLONOSCOPY N/A 8/5/2019    COLONOSCOPY POLYPECTOMY SNARE/COLD BIOPSY performed by Mahendra Ramirez,

## 2024-01-19 ENCOUNTER — TELEPHONE (OUTPATIENT)
Dept: ORTHOPEDIC SURGERY | Age: 54
End: 2024-01-19

## 2024-03-25 ENCOUNTER — OFFICE VISIT (OUTPATIENT)
Dept: FAMILY MEDICINE CLINIC | Age: 54
End: 2024-03-25
Payer: COMMERCIAL

## 2024-03-25 ENCOUNTER — OFFICE VISIT (OUTPATIENT)
Dept: ORTHOPEDIC SURGERY | Age: 54
End: 2024-03-25
Payer: COMMERCIAL

## 2024-03-25 VITALS — HEIGHT: 72 IN | WEIGHT: 315 LBS | BODY MASS INDEX: 42.66 KG/M2

## 2024-03-25 VITALS
BODY MASS INDEX: 42.66 KG/M2 | HEART RATE: 57 BPM | TEMPERATURE: 97.6 F | HEIGHT: 72 IN | SYSTOLIC BLOOD PRESSURE: 120 MMHG | WEIGHT: 315 LBS | DIASTOLIC BLOOD PRESSURE: 70 MMHG | OXYGEN SATURATION: 98 %

## 2024-03-25 DIAGNOSIS — I10 ESSENTIAL HYPERTENSION: ICD-10-CM

## 2024-03-25 DIAGNOSIS — Z12.5 SCREENING FOR PROSTATE CANCER: ICD-10-CM

## 2024-03-25 DIAGNOSIS — M17.11 LOCALIZED OSTEOARTHRITIS OF RIGHT KNEE: Primary | ICD-10-CM

## 2024-03-25 DIAGNOSIS — E66.01 CLASS 3 SEVERE OBESITY DUE TO EXCESS CALORIES WITH BODY MASS INDEX (BMI) OF 45.0 TO 49.9 IN ADULT, UNSPECIFIED WHETHER SERIOUS COMORBIDITY PRESENT (HCC): ICD-10-CM

## 2024-03-25 DIAGNOSIS — M17.12 PRIMARY OSTEOARTHRITIS OF LEFT KNEE: ICD-10-CM

## 2024-03-25 DIAGNOSIS — R73.9 ELEVATED BLOOD SUGAR: Primary | ICD-10-CM

## 2024-03-25 DIAGNOSIS — M25.561 RIGHT KNEE PAIN, UNSPECIFIED CHRONICITY: ICD-10-CM

## 2024-03-25 LAB
ALBUMIN SERPL-MCNC: 3.8 G/DL (ref 3.5–5.2)
ALP BLD-CCNC: 58 U/L (ref 40–129)
ALT SERPL-CCNC: 10 U/L (ref 0–40)
ANION GAP SERPL CALCULATED.3IONS-SCNC: 15 MMOL/L (ref 7–16)
AST SERPL-CCNC: 15 U/L (ref 0–39)
BASOPHILS ABSOLUTE: 0.05 K/UL (ref 0–0.2)
BASOPHILS RELATIVE PERCENT: 1 % (ref 0–2)
BILIRUB SERPL-MCNC: 0.5 MG/DL (ref 0–1.2)
BUN BLDV-MCNC: 13 MG/DL (ref 6–20)
CALCIUM SERPL-MCNC: 9 MG/DL (ref 8.6–10.2)
CHLORIDE BLD-SCNC: 102 MMOL/L (ref 98–107)
CHOLESTEROL: 153 MG/DL
CO2: 26 MMOL/L (ref 22–29)
CREAT SERPL-MCNC: 1 MG/DL (ref 0.7–1.2)
EOSINOPHILS ABSOLUTE: 0.09 K/UL (ref 0.05–0.5)
EOSINOPHILS RELATIVE PERCENT: 1 % (ref 0–6)
GFR SERPL CREATININE-BSD FRML MDRD: >90 ML/MIN/1.73M2
GLUCOSE BLD-MCNC: 80 MG/DL (ref 74–99)
HBA1C MFR BLD: 5.4 %
HCT VFR BLD CALC: 52.3 % (ref 37–54)
HDLC SERPL-MCNC: 43 MG/DL
HEMOGLOBIN: 16.9 G/DL (ref 12.5–16.5)
IMMATURE GRANULOCYTES %: 1 % (ref 0–5)
IMMATURE GRANULOCYTES ABSOLUTE: 0.04 K/UL (ref 0–0.58)
LDL CHOLESTEROL: 89 MG/DL
LYMPHOCYTES ABSOLUTE: 1.73 K/UL (ref 1.5–4)
LYMPHOCYTES RELATIVE PERCENT: 21 % (ref 20–42)
MCH RBC QN AUTO: 31.1 PG (ref 26–35)
MCHC RBC AUTO-ENTMCNC: 32.3 G/DL (ref 32–34.5)
MCV RBC AUTO: 96.1 FL (ref 80–99.9)
MONOCYTES ABSOLUTE: 0.68 K/UL (ref 0.1–0.95)
MONOCYTES RELATIVE PERCENT: 8 % (ref 2–12)
NEUTROPHILS ABSOLUTE: 5.57 K/UL (ref 1.8–7.3)
NEUTROPHILS RELATIVE PERCENT: 68 % (ref 43–80)
PDW BLD-RTO: 14.2 % (ref 11.5–15)
PLATELET # BLD: 211 K/UL (ref 130–450)
PMV BLD AUTO: 10 FL (ref 7–12)
POTASSIUM SERPL-SCNC: 4.2 MMOL/L (ref 3.5–5)
PROSTATE SPECIFIC ANTIGEN: 0.76 NG/ML (ref 0–4)
RBC # BLD: 5.44 M/UL (ref 3.8–5.8)
SODIUM BLD-SCNC: 143 MMOL/L (ref 132–146)
TOTAL PROTEIN: 6.3 G/DL (ref 6.4–8.3)
TRIGL SERPL-MCNC: 104 MG/DL
TSH SERPL DL<=0.05 MIU/L-ACNC: 2.45 UIU/ML (ref 0.27–4.2)
VLDLC SERPL CALC-MCNC: 21 MG/DL
WBC # BLD: 8.2 K/UL (ref 4.5–11.5)

## 2024-03-25 PROCEDURE — 3078F DIAST BP <80 MM HG: CPT | Performed by: FAMILY MEDICINE

## 2024-03-25 PROCEDURE — 20610 DRAIN/INJ JOINT/BURSA W/O US: CPT

## 2024-03-25 PROCEDURE — 99213 OFFICE O/P EST LOW 20 MIN: CPT

## 2024-03-25 PROCEDURE — 3074F SYST BP LT 130 MM HG: CPT | Performed by: FAMILY MEDICINE

## 2024-03-25 PROCEDURE — 99214 OFFICE O/P EST MOD 30 MIN: CPT | Performed by: FAMILY MEDICINE

## 2024-03-25 PROCEDURE — 83036 HEMOGLOBIN GLYCOSYLATED A1C: CPT | Performed by: FAMILY MEDICINE

## 2024-03-25 RX ORDER — TRIAMCINOLONE ACETONIDE 40 MG/ML
40 INJECTION, SUSPENSION INTRA-ARTICULAR; INTRAMUSCULAR ONCE
Status: COMPLETED | OUTPATIENT
Start: 2024-03-25 | End: 2024-03-25

## 2024-03-25 RX ORDER — LISINOPRIL AND HYDROCHLOROTHIAZIDE 12.5; 1 MG/1; MG/1
1 TABLET ORAL NIGHTLY
Qty: 90 TABLET | Refills: 1 | Status: SHIPPED | OUTPATIENT
Start: 2024-03-25

## 2024-03-25 RX ORDER — FLUTICASONE PROPIONATE 50 MCG
1 SPRAY, SUSPENSION (ML) NASAL DAILY
Qty: 3 EACH | Refills: 1 | Status: SHIPPED | OUTPATIENT
Start: 2024-03-25 | End: 2024-04-24

## 2024-03-25 RX ORDER — LIDOCAINE HYDROCHLORIDE 10 MG/ML
4 INJECTION, SOLUTION INFILTRATION; PERINEURAL ONCE
Status: COMPLETED | OUTPATIENT
Start: 2024-03-25 | End: 2024-03-25

## 2024-03-25 RX ORDER — MELOXICAM 7.5 MG/1
7.5 TABLET ORAL DAILY
Qty: 30 TABLET | Refills: 0 | Status: SHIPPED | OUTPATIENT
Start: 2024-03-25

## 2024-03-25 RX ADMIN — TRIAMCINOLONE ACETONIDE 40 MG: 40 INJECTION, SUSPENSION INTRA-ARTICULAR; INTRAMUSCULAR at 11:10

## 2024-03-25 RX ADMIN — LIDOCAINE HYDROCHLORIDE 4 ML: 10 INJECTION, SOLUTION INFILTRATION; PERINEURAL at 11:09

## 2024-03-25 ASSESSMENT — ENCOUNTER SYMPTOMS
FACIAL SWELLING: 0
COUGH: 0
PHOTOPHOBIA: 0
ABDOMINAL DISTENTION: 0
SINUS PRESSURE: 0
SINUS PAIN: 0
VOMITING: 0
RHINORRHEA: 0
SHORTNESS OF BREATH: 0
BLOOD IN STOOL: 0
COLOR CHANGE: 0
CHEST TIGHTNESS: 0
CONSTIPATION: 0
APNEA: 0
DIARRHEA: 0

## 2024-03-25 ASSESSMENT — PATIENT HEALTH QUESTIONNAIRE - PHQ9
SUM OF ALL RESPONSES TO PHQ QUESTIONS 1-9: 0
1. LITTLE INTEREST OR PLEASURE IN DOING THINGS: NOT AT ALL
SUM OF ALL RESPONSES TO PHQ QUESTIONS 1-9: 0
SUM OF ALL RESPONSES TO PHQ9 QUESTIONS 1 & 2: 0
SUM OF ALL RESPONSES TO PHQ QUESTIONS 1-9: 0
2. FEELING DOWN, DEPRESSED OR HOPELESS: NOT AT ALL
SUM OF ALL RESPONSES TO PHQ QUESTIONS 1-9: 0

## 2024-03-25 NOTE — PROGRESS NOTES
Chief Complaint   Patient presents with    Knee Pain     Pt presents today with c/o R knee pain x5 days with no known injury. Pt saw his pcp this morning and xrays were obtained. Pt states pain is in medial knee as well as posterior knee. Pt was given an Rx of meloxicam this morning. Had taken Tylenol for pain with little relief. Pain increases with ambulation. Previous knee surgery when he was approx 6 years old.        Subjective:  José Antonio Lara presents today for c/o right knee pain x 5 days with no known injury. Patient states pain started gradually over the course of a few days. Patient works at [employer] where he is on his feet all day walking on concrete janes. Patient states pain worse with ambulation and going up and down stairs. Occasionally feels as though knee \"almost gives out\". Patient has taken tylenol for pain without relief, and is using a hinged knee brace with some relief. Patient has had previous CSI's in left knee, and would like to have CSI for right knee.    Review of Systems -  all pertinent positives and negatives in HPI.      Objective:    General: Alert and oriented X 3, normocephalic atraumatic, external ears and eye normal, sclera clear, no acute distress, respirations easy and unlabored with no audible wheezes, skin warm and dry, speech and dress appropriate for noted age, affect euthymic.    Extremity:  Left Lower Extremity  Skin clean dry and intact, without signs of infection  Mild edema noted  Compartments supple throughout thigh and leg  Calf supple and nontender  Demonstrates active knee flexion/extension 10-95, ankle plantar/dorsiflexion/great toe extension.   States sensation intact to touch in sural/deep peroneal/superficial peroneal/saphenous/posterior tibial nerve distributions to foot/ankle.   Palpable dorsalis pedis and posterior tibialis pulses, cap refill brisk in toes, foot warm/perfused.     Right Knee: On visual inspection there is no obvious deformity to the

## 2024-03-25 NOTE — PATIENT INSTRUCTIONS
*Patient can use VOLTAREN GEL 1% (DICLOFENAC SODIUM) Apply 4 grams twice daily to the affected knee as needed, which can be obtained over the counter.

## 2024-03-25 NOTE — PROGRESS NOTES
polyuria.   Genitourinary:  Negative for decreased urine volume, frequency and urgency.   Musculoskeletal:  Negative for arthralgias and gait problem.   Skin:  Negative for color change.   Allergic/Immunologic: Negative for environmental allergies and food allergies.   Neurological:  Negative for dizziness, light-headedness and headaches.   Hematological:  Negative for adenopathy.   Psychiatric/Behavioral:  Negative for agitation and confusion.        Physical Exam  Constitutional:       Appearance: Normal appearance.   HENT:      Head: Normocephalic and atraumatic.      Nose: Nose normal. No congestion or rhinorrhea.   Eyes:      Extraocular Movements: Extraocular movements intact.      Pupils: Pupils are equal, round, and reactive to light.   Cardiovascular:      Rate and Rhythm: Normal rate and regular rhythm.      Heart sounds: No murmur heard.     No friction rub. No gallop.   Pulmonary:      Effort: Pulmonary effort is normal.      Breath sounds: Normal breath sounds.   Chest:      Chest wall: No tenderness.   Abdominal:      General: Abdomen is flat. Bowel sounds are normal. There is no distension.      Palpations: Abdomen is soft.      Tenderness: There is no abdominal tenderness.   Musculoskeletal:      Cervical back: Normal range of motion.      Right knee: Decreased range of motion. Tenderness present over the medial joint line.   Skin:     General: Skin is warm and dry.   Neurological:      General: No focal deficit present.      Mental Status: He is alert and oriented to person, place, and time.   Psychiatric:         Mood and Affect: Mood normal.         Assessment:  José Antonio was seen today for knee pain, leg cramps and hypertension.    Diagnoses and all orders for this visit:    Elevated blood sugar  -     POCT glycosylated hemoglobin (Hb A1C)    Right knee pain, unspecified chronicity  -     XR KNEE RIGHT (MIN 4 VIEWS); Future    Essential hypertension  -     lisinopril-hydroCHLOROthiazide

## 2024-04-25 ENCOUNTER — OFFICE VISIT (OUTPATIENT)
Dept: ORTHOPEDIC SURGERY | Age: 54
End: 2024-04-25
Payer: COMMERCIAL

## 2024-04-25 DIAGNOSIS — M17.12 PRIMARY OSTEOARTHRITIS OF LEFT KNEE: Primary | ICD-10-CM

## 2024-04-25 PROCEDURE — 99212 OFFICE O/P EST SF 10 MIN: CPT | Performed by: STUDENT IN AN ORGANIZED HEALTH CARE EDUCATION/TRAINING PROGRAM

## 2024-04-25 PROCEDURE — 20610 DRAIN/INJ JOINT/BURSA W/O US: CPT | Performed by: STUDENT IN AN ORGANIZED HEALTH CARE EDUCATION/TRAINING PROGRAM

## 2024-04-25 RX ORDER — MELOXICAM 7.5 MG/1
7.5 TABLET ORAL DAILY
Qty: 30 TABLET | Refills: 3 | Status: SHIPPED | OUTPATIENT
Start: 2024-04-25

## 2024-04-25 RX ORDER — TRIAMCINOLONE ACETONIDE 40 MG/ML
80 INJECTION, SUSPENSION INTRA-ARTICULAR; INTRAMUSCULAR ONCE
Status: COMPLETED | OUTPATIENT
Start: 2024-04-25 | End: 2024-04-25

## 2024-04-25 RX ORDER — BUPIVACAINE HYDROCHLORIDE 2.5 MG/ML
3 INJECTION, SOLUTION INFILTRATION; PERINEURAL ONCE
Status: COMPLETED | OUTPATIENT
Start: 2024-04-25 | End: 2024-04-25

## 2024-04-25 RX ADMIN — BUPIVACAINE HYDROCHLORIDE 7.5 MG: 2.5 INJECTION, SOLUTION INFILTRATION; PERINEURAL at 08:24

## 2024-04-25 RX ADMIN — TRIAMCINOLONE ACETONIDE 80 MG: 40 INJECTION, SUSPENSION INTRA-ARTICULAR; INTRAMUSCULAR at 08:24

## 2024-04-25 NOTE — PROGRESS NOTES
CHIEF COMPLAINT:   Chief Complaint   Patient presents with    Follow-up     Left knee pain, had CSI 1/18/24. Would like to repeat injection        HPI update 4/25/2024: José Antonio is here today for left knee osteoarthritis.  He is receiving good relief from his injections.  He would like to repeat these today.  At his last visit we gave him a brace.  He has had no recent injuries.  Denies fever and chills.  Denies numbness and tingling.      HPI update 1/18/2024: José Antonio is here today for left knee pain.  Reduction repeat injection.  He good relief after his injection in October.  He would also like a brace.  He states that he fell onto his hands and knees a few days ago and this has been progressively getting better.  He has had an exacerbation of his knee arthritis with medial sided pain that is worse activity.  He has difficulty with his physical job.    HPI:    José Antonio Lara is a 53 y.o. year old male with left knee pain.  He woke up and it started hurting spontaneously and this has been going on for the past month.  He did get x-rays on 9/18/2023 which revealed arthritis.  He was referred here for further care.  His BMI is 50.  He has not had any recent injuries.  Denies mechanical symptoms.  He has activity related knee pain that is sharp and nonradiating.  Pain is 6 out of 10.  Is worse going up and down stairs.  Denies fevers and chills.  Denies numbness tingling.    PAST MEDICAL HISTORY  Past Medical History:   Diagnosis Date    Ankle pain     Chronic back pain     Chronic knee pain     Colon polyps     Diverticulitis     Dizzy spells     Essential hypertension 9/18/2019    Fatty liver     Foot pain     Gout     occaisional flareups    Hyperlipidemia     Hypertension     Iron deficiency     Obesity     Routine chest x-ray     Unspecified sleep apnea     Vitamin B12 deficiency     Vitamin D deficiency 11/10/2010       PAST SURGICAL HISTORY  Past Surgical History:   Procedure Laterality Date    COLONOSCOPY

## 2024-08-01 ENCOUNTER — OFFICE VISIT (OUTPATIENT)
Dept: ORTHOPEDIC SURGERY | Age: 54
End: 2024-08-01

## 2024-08-01 ENCOUNTER — TELEPHONE (OUTPATIENT)
Dept: ORTHOPEDIC SURGERY | Age: 54
End: 2024-08-01

## 2024-08-01 ENCOUNTER — PREP FOR PROCEDURE (OUTPATIENT)
Dept: ORTHOPEDIC SURGERY | Age: 54
End: 2024-08-01

## 2024-08-01 VITALS — WEIGHT: 315 LBS | BODY MASS INDEX: 40.43 KG/M2 | HEIGHT: 74 IN

## 2024-08-01 DIAGNOSIS — M17.11 PRIMARY OSTEOARTHRITIS OF RIGHT KNEE: Primary | ICD-10-CM

## 2024-08-01 DIAGNOSIS — G89.29 CHRONIC PAIN OF RIGHT KNEE: ICD-10-CM

## 2024-08-01 DIAGNOSIS — M25.561 CHRONIC PAIN OF RIGHT KNEE: ICD-10-CM

## 2024-08-01 NOTE — TELEPHONE ENCOUNTER
Patient was informed that SCOTT ODONNELL will be calling him to schedule a CT Scan Right Knee prior to surgery date 9/30/2024 for Right TKA (NATHAN).  Order for CT Scan fax'd to [facility] for scheduling

## 2024-08-01 NOTE — PROGRESS NOTES
CHIEF COMPLAINT:   Chief Complaint   Patient presents with    Follow-up     F/u on bl knee pain, last csi on left knee was 04/25/24, he wants to set up surgery if possible      HPI update 8/1/2024: José Antonio is here today for bilateral knee osteoarthritis. Previous injection to the left knee in April which provided him relief up until recently. He also had injection to his right knee in March which provided him some relief. At this point he is exhausted from injections and the pain and would like to discuss surgery. Both knees hurt equally 6/10. He denies numbness and tingling. He denies recent injury. He is employed at a factory.    HPI update 4/25/2024: José Antonio is here today for left knee osteoarthritis.  He is receiving good relief from his injections.  He would like to repeat these today.  At his last visit we gave him a brace.  He has had no recent injuries.  Denies fever and chills.  Denies numbness and tingling.    HPI update 1/18/2024: José Antonio is here today for left knee pain.  Reduction repeat injection.  He good relief after his injection in October.  He would also like a brace.  He states that he fell onto his hands and knees a few days ago and this has been progressively getting better.  He has had an exacerbation of his knee arthritis with medial sided pain that is worse activity.  He has difficulty with his physical job.    HPI:    José Antonio Lara is a 54 y.o. year old male with left knee pain.  He woke up and it started hurting spontaneously and this has been going on for the past month.  He did get x-rays on 9/18/2023 which revealed arthritis.  He was referred here for further care.  His BMI is 50.  He has not had any recent injuries.  Denies mechanical symptoms.  He has activity related knee pain that is sharp and nonradiating.  Pain is 6 out of 10.  Is worse going up and down stairs.  Denies fevers and chills.  Denies numbness tingling.    PAST MEDICAL HISTORY  Past Medical History:   Diagnosis Date

## 2024-08-01 NOTE — PATIENT INSTRUCTIONS
gums must be in good condition for surgery. Have cavities filled, broken teeth repaired and root canals and / or teeth extractions completed prior to surgery. If teeth extractions are needed prior to surgery, a note from your dentist is required stating, your mouth is healed and free of infection.  6.  Inform our office if you are ill, have an infection or if any doctor has prescribed an antibiotic for you.  You must be free of infection for surgery. If you become ill within 2 weeks of your surgery date (cold, congestion, flu), surgery will be postponed. A letter of medical clearance from your family doctor will then be required to re-schedule your surgery on the next available date.  7.  If you have private insurance, call customer service and check your benefits and any deductibles / co-pays. Most insurance companies approve only an overnight stay in the hospital. You will be discharged from the hospital the day after your surgery, again check with your insurance company.  8.  The goal is to discharge you home to your , family or responsible friend. Please have arrangements for your care made before your surgery. If you feel inpatient rehab will be needed, contact your insurance carrier prior to surgery for a list of inpatient rehab facilities. Visiting one or two facilities before surgery may help with your decision.  9.  After surgery, continue regular checkups with your dentist. Please inform your dentist of your joint replacement surgery upon scheduling appointments. Your dentist is to follow The American Dental Association Guidelines for Premedication / Antibiotics prior to dental procedures.   10.  All requests for pain medication refills from our office must be called in to [phone], Ext [phone].  Please speak clearly when leaving your message with the following information:  Your name, spelling of last name, date of birth, name of medication, your pharmacy name and phone number.  Please do not wait

## 2024-08-02 ENCOUNTER — TELEPHONE (OUTPATIENT)
Dept: ORTHOPEDIC SURGERY | Age: 54
End: 2024-08-02

## 2024-08-02 DIAGNOSIS — M17.12 PRIMARY OSTEOARTHRITIS OF LEFT KNEE: ICD-10-CM

## 2024-08-02 RX ORDER — MELOXICAM 7.5 MG/1
7.5 TABLET ORAL DAILY
Qty: 30 TABLET | Refills: 3 | Status: SHIPPED | OUTPATIENT
Start: 2024-08-02

## 2024-08-02 RX ORDER — FLUTICASONE PROPIONATE 50 MCG
1 SPRAY, SUSPENSION (ML) NASAL DAILY
Qty: 3 EACH | Refills: 1 | Status: SHIPPED | OUTPATIENT
Start: 2024-08-02 | End: 2024-09-01

## 2024-08-02 NOTE — TELEPHONE ENCOUNTER
Patient called LM on VM He is unable to keep surgery date for 9/30/2024 due to short term disability issue with employer.      Moved surgery date to 10/21/2024 per patient request.  Patient notified, also changed stop dates on OTC Vitamins, minerals, supplements and Mobic, last doses now 10/13/2024.  Patient verbalized understanding of above.      Changed pre-op visit for weight check and post-op visit.     Informed Imelda in [facility] of surgery date change

## 2024-08-02 NOTE — TELEPHONE ENCOUNTER
Rx is ready to be sent     Last Appointment:  3/25/2024  Future Appointments   Date Time Provider Department Center   9/23/2024  8:45 AM Triston Humphrey MD [facility]   10/16/2024  9:20 AM Klaus Abdul DO [facility]   11/7/2024  8:10 AM [facility]

## 2024-08-28 NOTE — DISCHARGE INSTRUCTIONS
[facility] Department of Orthopedic Surgery  [address]   [phone]   [place] [address]    Dr. Klaus Abdul, DO    Orthopaedics Discharge Instructions   Weight bearing Status - Weight bearing as tolerated - on right lower Extremity  Pain medication Per Prescriptions  Contact Office for Medication Refill- [phone]  Office can refill pain med every 7 days  If patient discharging to facility then pain control will be continued per facility physician  Ice to operative/injured site for 15-30 minutes of each hour for next 5 days    Recommend that you continue to ice the area 2-3 times per day after this   Elevate operative/injured limb on 2 pillows at home  Goal is to have limb above the heart if able  Continue DVT Prophylaxis (blood clot prevention) as Prescribed: Aspirin 81 mg twice daily for 28 days   Wound care - OK to remove Aquacel dressing 7 days post-op. If drainage is present, please apply daily clean dry dressings. Staples will be removed at follow up visit.   Bowel regimen - Be sure to drink plenty of water and increase fiber. If constipation persists, OK to take Colace, Miralax, Duclolax or Magnesium Citrate. If this does not resolve, please contact you PCP for further treatment.     Follow Up in Office in 2 weeks. Your first post op appointment is often the PAs.     Call the office at [phone] directions or with any questions.  Watch for these significant complications.  Call physician if they or any other problems occur:  Fever over 101°, redness, swelling or warmth at the operative site  Unrelieved nausea    Foul smelling or cloudy drainage at the operative site   Unrelieved pain    Blood soaked dressing. (Some oozing may be normal)     Numb, pale, blue, cold or tingling extremity    Future Appointments   Date Time Provider Department Center   9/23/2024  7:00 AM SEB CT 1-BD [facility] Radiolog   10/1/2024 10:15 AM Triston Humphrey MD [facility]   10/16/2024  9:20 AM

## 2024-09-23 ENCOUNTER — HOSPITAL ENCOUNTER (OUTPATIENT)
Dept: CT IMAGING | Age: 54
Discharge: HOME | End: 2024-09-25
Payer: COMMERCIAL

## 2024-09-23 DIAGNOSIS — M25.561 CHRONIC PAIN OF RIGHT KNEE: ICD-10-CM

## 2024-09-23 DIAGNOSIS — M17.11 PRIMARY OSTEOARTHRITIS OF RIGHT KNEE: ICD-10-CM

## 2024-09-23 DIAGNOSIS — G89.29 CHRONIC PAIN OF RIGHT KNEE: ICD-10-CM

## 2024-09-23 PROCEDURE — 73700 CT LOWER EXTREMITY W/O DYE: CPT

## 2024-10-01 ENCOUNTER — OFFICE VISIT (OUTPATIENT)
Dept: FAMILY MEDICINE CLINIC | Age: 54
End: 2024-10-01
Payer: COMMERCIAL

## 2024-10-01 VITALS
BODY MASS INDEX: 42.66 KG/M2 | HEART RATE: 58 BPM | OXYGEN SATURATION: 96 % | SYSTOLIC BLOOD PRESSURE: 126 MMHG | DIASTOLIC BLOOD PRESSURE: 84 MMHG | TEMPERATURE: 98.4 F | HEIGHT: 72 IN | RESPIRATION RATE: 16 BRPM | WEIGHT: 315 LBS

## 2024-10-01 DIAGNOSIS — R94.31 ABNORMAL EKG: ICD-10-CM

## 2024-10-01 DIAGNOSIS — Z01.818 PRE-OP EXAM: Primary | ICD-10-CM

## 2024-10-01 DIAGNOSIS — R10.31 RIGHT LOWER QUADRANT ABDOMINAL PAIN: ICD-10-CM

## 2024-10-01 LAB
ALBUMIN: 3.9 G/DL (ref 3.5–5.2)
ALP BLD-CCNC: 56 U/L (ref 40–129)
ALT SERPL-CCNC: 10 U/L (ref 0–40)
ANION GAP SERPL CALCULATED.3IONS-SCNC: 10 MMOL/L (ref 7–16)
AST SERPL-CCNC: 15 U/L (ref 0–39)
BASOPHILS ABSOLUTE: 0.03 K/UL (ref 0–0.2)
BASOPHILS RELATIVE PERCENT: 1 % (ref 0–2)
BILIRUB SERPL-MCNC: 0.5 MG/DL (ref 0–1.2)
BUN BLDV-MCNC: 14 MG/DL (ref 6–20)
CALCIUM SERPL-MCNC: 9.2 MG/DL (ref 8.6–10.2)
CHLORIDE BLD-SCNC: 110 MMOL/L (ref 98–107)
CO2: 26 MMOL/L (ref 22–29)
CREAT SERPL-MCNC: 0.9 MG/DL (ref 0.7–1.2)
EOSINOPHILS ABSOLUTE: 0.07 K/UL (ref 0.05–0.5)
EOSINOPHILS RELATIVE PERCENT: 1 % (ref 0–6)
GFR, ESTIMATED: >90 ML/MIN/1.73M2
GLUCOSE BLD-MCNC: 93 MG/DL (ref 74–99)
HCT VFR BLD CALC: 49.9 % (ref 37–54)
HEMOGLOBIN: 16 G/DL (ref 12.5–16.5)
IMMATURE GRANULOCYTES %: 0 % (ref 0–5)
IMMATURE GRANULOCYTES ABSOLUTE: <0.03 K/UL (ref 0–0.58)
LYMPHOCYTES ABSOLUTE: 1.62 K/UL (ref 1.5–4)
LYMPHOCYTES RELATIVE PERCENT: 26 % (ref 20–42)
MCH RBC QN AUTO: 31.6 PG (ref 26–35)
MCHC RBC AUTO-ENTMCNC: 32.1 G/DL (ref 32–34.5)
MCV RBC AUTO: 98.4 FL (ref 80–99.9)
MONOCYTES ABSOLUTE: 0.65 K/UL (ref 0.1–0.95)
MONOCYTES RELATIVE PERCENT: 11 % (ref 2–12)
NEUTROPHILS ABSOLUTE: 3.82 K/UL (ref 1.8–7.3)
NEUTROPHILS RELATIVE PERCENT: 62 % (ref 43–80)
PDW BLD-RTO: 12.9 % (ref 11.5–15)
PLATELET # BLD: 177 K/UL (ref 130–450)
PMV BLD AUTO: 10.3 FL (ref 7–12)
POTASSIUM SERPL-SCNC: 4.1 MMOL/L (ref 3.5–5)
RBC # BLD: 5.07 M/UL (ref 3.8–5.8)
SODIUM BLD-SCNC: 146 MMOL/L (ref 132–146)
TOTAL PROTEIN: 6.1 G/DL (ref 6.4–8.3)
WBC # BLD: 6.2 K/UL (ref 4.5–11.5)

## 2024-10-01 PROCEDURE — 99213 OFFICE O/P EST LOW 20 MIN: CPT | Performed by: FAMILY MEDICINE

## 2024-10-01 PROCEDURE — 93000 ELECTROCARDIOGRAM COMPLETE: CPT | Performed by: FAMILY MEDICINE

## 2024-10-01 PROCEDURE — 3079F DIAST BP 80-89 MM HG: CPT | Performed by: FAMILY MEDICINE

## 2024-10-01 PROCEDURE — 3074F SYST BP LT 130 MM HG: CPT | Performed by: FAMILY MEDICINE

## 2024-10-01 SDOH — ECONOMIC STABILITY: FOOD INSECURITY: WITHIN THE PAST 12 MONTHS, YOU WORRIED THAT YOUR FOOD WOULD RUN OUT BEFORE YOU GOT MONEY TO BUY MORE.: NEVER TRUE

## 2024-10-01 SDOH — ECONOMIC STABILITY: FOOD INSECURITY: WITHIN THE PAST 12 MONTHS, THE FOOD YOU BOUGHT JUST DIDN'T LAST AND YOU DIDN'T HAVE MONEY TO GET MORE.: NEVER TRUE

## 2024-10-01 SDOH — ECONOMIC STABILITY: INCOME INSECURITY: HOW HARD IS IT FOR YOU TO PAY FOR THE VERY BASICS LIKE FOOD, HOUSING, MEDICAL CARE, AND HEATING?: NOT HARD AT ALL

## 2024-10-01 NOTE — PROGRESS NOTES
José Antonio Lara is a 54 y.o. male accompanied by herself   CC:   Chief Complaint   Patient presents with   • Pre-op Exam     Right TKA 10/21/24       Pre-surgical evaluation:  Patient is here by request of  *** who has upcoming *** surgery scheduled with patient.  Patient will be undergoing *** anaesthesia.  He has no complaints or concerns today.  He {IS/IS NOT:19932} generally healthy.  Chronic medical problems include ***.  These {ARE/ARE NOT:94839} generally controlled and stable at this time.  BP *** today.  Most recent labs reviewed with patient and  {ARE/ARE NOT:32239} remarkable.  Patient {DOES/NOT:19883} smoke.  Patient {DOES/NOT:19883} drink alcohol.  Patient  {DOES/NOT:19883} use drugs.  Overall doing well.  Patient {DOES/NOT:19883} have chest pain, palpitations, shortness of breath, or orthopnea.  No known heart, kidney or liver issue to date to the best of patient's knowledge, my knowledge, or of that recorded in patient's current medical record.  Patient {HAS HAS NOT:49030} had cardiac testing in the past including EKG, stress test, and/or catheterization.  If available, these records have been reviewed today.       Patient's past medical, surgical, social and/or family history reviewed, updated in chart, and are non-contributory (unless otherwise stated).  Medications and allergies also reviewed and updated in chart.      /84 (Site: Left Upper Arm, Position: Sitting, Cuff Size: Large Adult)   Pulse 58   Temp 98.4 °F (36.9 °C) (Temporal)   Resp 16   Ht 1.829 m (6')   Wt (!) 151.4 kg (333 lb 11.2 oz)   SpO2 96%   BMI 45.26 kg/m²     Review of Systems    Physical Exam    Assessment:  José Antonio was seen today for pre-op exam.    Diagnoses and all orders for this visit:    Pre-op exam  -     CBC with Auto Differential  -     Comprehensive Metabolic Panel  -     EKG 12 Lead  -     EKG 12 lead; Future  -     EKG 12 lead              Follow Up:  No follow-ups on file.      As above.  Call or

## 2024-10-01 NOTE — PROGRESS NOTES
José Antonio Lara is a 54 y.o. male accompanied by himself   CC:   Chief Complaint   Patient presents with    Pre-op Exam     Right TKA 10/21/24       Abdomen Pain:   Some time   Persistent   RLQ  No fever  No chills   Good bowel movements     Pre-surgical evaluation:  Patient is here by request of Dr. Abdul who has upcoming right total knee arthroplasty surgery scheduled with patient.  Patient will be undergoing general anaesthesia.  He has no complaints or concerns today.  He is not generally healthy.  Chronic medical problems include obesity, hypertension.  These are generally controlled and stable at this time.  /84 today.  Most recent labs reviewed with patient and  are not remarkable.  Patient does not smoke.  Patient does drink alcohol.  Patient  does not use drugs.  Overall doing well.  Patient does not have chest pain, palpitations, shortness of breath, or orthopnea.  No known heart, kidney or liver issue to date to the best of patient's knowledge, my knowledge, or of that recorded in patient's current medical record.  Patient has had cardiac testing in the past including EKG, stress test, and/or catheterization.  If available, these records have been reviewed today.         Patient's past medical, surgical, social and/or family history reviewed, updated in chart, and are non-contributory (unless otherwise stated).  Medications and allergies also reviewed and updated in chart.      /84 (Site: Left Upper Arm, Position: Sitting, Cuff Size: Large Adult)   Pulse 58   Temp 98.4 °F (36.9 °C) (Temporal)   Resp 16   Ht 1.829 m (6')   Wt (!) 151.4 kg (333 lb 11.2 oz)   SpO2 96%   BMI 45.26 kg/m²     Review of Systems   Constitutional:  Negative for chills, fatigue and fever.   HENT:  Negative for congestion, ear pain, facial swelling, rhinorrhea, sinus pressure and sinus pain.    Eyes:  Negative for photophobia and visual disturbance.   Respiratory:  Negative for apnea, cough, chest tightness

## 2024-10-07 ENCOUNTER — TELEPHONE (OUTPATIENT)
Dept: CARDIOLOGY | Age: 54
End: 2024-10-07

## 2024-10-07 ENCOUNTER — TELEPHONE (OUTPATIENT)
Dept: FAMILY MEDICINE CLINIC | Age: 54
End: 2024-10-07

## 2024-10-07 ENCOUNTER — TELEPHONE (OUTPATIENT)
Dept: ORTHOPEDIC SURGERY | Age: 54
End: 2024-10-07

## 2024-10-07 NOTE — TELEPHONE ENCOUNTER
ECHO scheduled for 10/14/24 denied.    Denial reason:  Your doctor told us that you have an abnormal heart rhythm. Your doctor ordered an ultrasound picture of your heart. This test should be used for certain types of abnormal heart rhythms. We reviewed the notes we have. The notes do not show that you have one of those abnormal heart rhythms. Based on the information we have, this test is not medically necessary for you     No option for peer to peer available.

## 2024-10-07 NOTE — TELEPHONE ENCOUNTER
Swapna just got a call from [facility] that his insurance is denying payment for the Echo that you ordered.     He will be contacting them for more information, but wanted you to be aware of this.        He provided the following number to his insurance company if needed for any reason:            [phone]

## 2024-10-07 NOTE — TELEPHONE ENCOUNTER
Left message to notify patient that insurance company denied ECHO scheduled for 10/14 and that we notified his physician as well.  Asked for callback to confirm he received message.

## 2024-10-07 NOTE — TELEPHONE ENCOUNTER
Right TKA 10/21/24    No authorization required    Lissa [facility]  [id number]

## 2024-10-09 ENCOUNTER — TELEPHONE (OUTPATIENT)
Dept: FAMILY MEDICINE CLINIC | Age: 54
End: 2024-10-09

## 2024-10-09 ASSESSMENT — PROMIS GLOBAL HEALTH SCALE
IN GENERAL, WOULD YOU SAY YOUR QUALITY OF LIFE IS...[ON A SCALE OF 1 (POOR) TO 5 (EXCELLENT)]: GOOD
IN GENERAL, HOW WOULD YOU RATE YOUR MENTAL HEALTH, INCLUDING YOUR MOOD AND YOUR ABILITY TO THINK [ON A SCALE OF 1 (POOR) TO 5 (EXCELLENT)]?: GOOD
IN GENERAL, WOULD YOU SAY YOUR HEALTH IS...[ON A SCALE OF 1 (POOR) TO 5 (EXCELLENT)]: VERY GOOD
IN THE PAST 7 DAYS, HOW WOULD YOU RATE YOUR FATIGUE ON AVERAGE [ON A SCALE FROM 1 (NONE) TO 5 (VERY SEVERE)]?: MODERATE
IN GENERAL, HOW WOULD YOU RATE YOUR SATISFACTION WITH YOUR SOCIAL ACTIVITIES AND RELATIONSHIPS [ON A SCALE OF 1 (POOR) TO 5 (EXCELLENT)]?: GOOD
IN THE PAST 7 DAYS, HOW WOULD YOU RATE YOUR PAIN ON AVERAGE [ON A SCALE FROM 0 (NO PAIN) TO 10 (WORST IMAGINABLE PAIN)]?: 3
HOW IS THE PROMIS V1.1 BEING ADMINISTERED?: ELECTRONIC
IN GENERAL, PLEASE RATE HOW WELL YOU CARRY OUT YOUR USUAL SOCIAL ACTIVITIES (INCLUDES ACTIVITIES AT HOME, AT WORK, AND IN YOUR COMMUNITY, AND RESPONSIBILITIES AS A PARENT, CHILD, SPOUSE, EMPLOYEE, FRIEND, ETC) [ON A SCALE OF 1 (POOR) TO 5 (EXCELLENT)]?: GOOD
IN GENERAL, HOW WOULD YOU RATE YOUR PHYSICAL HEALTH [ON A SCALE OF 1 (POOR) TO 5 (EXCELLENT)]?: GOOD
WHO IS THE PERSON COMPLETING THE PROMIS V1.1 SURVEY?: SELF
SUM OF RESPONSES TO QUESTIONS 3, 6, 7, & 8: 12
SUM OF RESPONSES TO QUESTIONS 2, 4, 5, & 10: 11
IN THE PAST 7 DAYS, HOW OFTEN HAVE YOU BEEN BOTHERED BY EMOTIONAL PROBLEMS, SUCH AS FEELING ANXIOUS, DEPRESSED, OR IRRITABLE [ON A SCALE FROM 1 (NEVER) TO 5 (ALWAYS)]?: OFTEN
TO WHAT EXTENT ARE YOU ABLE TO CARRY OUT YOUR EVERYDAY PHYSICAL ACTIVITIES SUCH AS WALKING, CLIMBING STAIRS, CARRYING GROCERIES, OR MOVING A CHAIR [ON A SCALE OF 1 (NOT AT ALL) TO 5 (COMPLETELY)]?: MODERATELY

## 2024-10-09 ASSESSMENT — KOOS JR
STANDING UPRIGHT: MILD
TWISING OR PIVOTING ON KNEE: MODERATE
GOING UP OR DOWN STAIRS: MILD
BENDING TO THE FLOOR TO PICK UP OBJECT: MODERATE
HOW SEVERE IS YOUR KNEE STIFFNESS AFTER FIRST WAKING IN MORNING: MILD
RISING FROM SITTING: MODERATE
KOOS JR TOTAL INTERVAL SCORE: 63.776

## 2024-10-09 NOTE — TELEPHONE ENCOUNTER
José Antonio just called back and said that he was contacted by his insurance company that coverage for the Echo was denied because it does not fall into an approved category.      He does not want to wait for this. He wants to move forward with his surgery.  He states he needs to have it done soon, before the end of the year.

## 2024-10-09 NOTE — TELEPHONE ENCOUNTER
Please facilitate this patient in getting in touch with cardiology.   That referral was placed already.

## 2024-10-09 NOTE — TELEPHONE ENCOUNTER
I refaxed the referral and EKG to [facility] today.      I tried to reach anyone at [facility] by phone, but had to leave a message for a call back to the nurse line.      I tried to reach José Antonio to find out if he is scheduled and how I can help if not.       I had to leave a message on his voicemail to call back to the nurse line regarding this.

## 2024-10-09 NOTE — TELEPHONE ENCOUNTER
[facility] Insurance company        He is calling to let you know that the authorization for the Echo for this patient has been denied pending a Peer to Peer review.        The phone number to call: [phone]          Case Number - [id number]

## 2024-10-10 ENCOUNTER — TELEPHONE (OUTPATIENT)
Dept: FAMILY MEDICINE CLINIC | Age: 54
End: 2024-10-10

## 2024-10-10 NOTE — TELEPHONE ENCOUNTER
Once again.  The echo is not the problem.  The patient is going to require cardiac clearance.  The schedule with: Cardiology is what needs to happen next.  In the meantime if the patient could get the echo as well as the CAT scan of his abdomen that would be ideal.  The deciding factor for his surgery is not the tests but the clearance by a cardiologist.

## 2024-10-10 NOTE — TELEPHONE ENCOUNTER
José Antonio calling about his scheduled surgery. He is aware that his insurance has a problem with the Echo, and worried that this will affect when he can have the surgery.     He really wants this surgery done before the end of this year. He does not want this to be moved into next year.

## 2024-10-10 NOTE — TELEPHONE ENCOUNTER
The echo was not the determining factor behind the patient's surgical clearance.  The fact that he had an abnormal EKG in my office is the determining factor.  This patient needs to be seen by cardiology, cardiology will provide surgical clearance from a cardiological standpoint and then we can proceed with surgery.

## 2024-10-11 ENCOUNTER — TELEPHONE (OUTPATIENT)
Dept: CARDIOLOGY CLINIC | Age: 54
End: 2024-10-11

## 2024-10-11 ENCOUNTER — ANESTHESIA EVENT (OUTPATIENT)
Dept: OPERATING ROOM | Age: 54
End: 2024-10-11
Payer: COMMERCIAL

## 2024-10-11 ENCOUNTER — HOSPITAL ENCOUNTER (OUTPATIENT)
Dept: PREADMISSION TESTING | Age: 54
Discharge: HOME | End: 2024-10-11
Payer: COMMERCIAL

## 2024-10-11 VITALS
BODY MASS INDEX: 41.75 KG/M2 | WEIGHT: 315 LBS | TEMPERATURE: 97.4 F | RESPIRATION RATE: 18 BRPM | HEIGHT: 73 IN | OXYGEN SATURATION: 97 % | DIASTOLIC BLOOD PRESSURE: 65 MMHG | HEART RATE: 50 BPM | SYSTOLIC BLOOD PRESSURE: 141 MMHG

## 2024-10-11 LAB
ANION GAP SERPL CALCULATED.3IONS-SCNC: 9 MMOL/L (ref 7–16)
BUN SERPL-MCNC: 14 MG/DL (ref 6–20)
CALCIUM SERPL-MCNC: 8.5 MG/DL (ref 8.6–10.2)
CHLORIDE SERPL-SCNC: 109 MMOL/L (ref 98–107)
CO2 SERPL-SCNC: 26 MMOL/L (ref 22–29)
CREAT SERPL-MCNC: 1 MG/DL (ref 0.7–1.2)
ERYTHROCYTE [DISTWIDTH] IN BLOOD BY AUTOMATED COUNT: 12.6 % (ref 11.5–15)
GFR, ESTIMATED: >90 ML/MIN/1.73M2
GLUCOSE SERPL-MCNC: 98 MG/DL (ref 74–99)
HCT VFR BLD AUTO: 47.4 % (ref 37–54)
HGB BLD-MCNC: 15.7 G/DL (ref 12.5–16.5)
MCH RBC QN AUTO: 32.1 PG (ref 26–35)
MCHC RBC AUTO-ENTMCNC: 33.1 G/DL (ref 32–34.5)
MCV RBC AUTO: 96.9 FL (ref 80–99.9)
PLATELET # BLD AUTO: 162 K/UL (ref 130–450)
PMV BLD AUTO: 10 FL (ref 7–12)
POTASSIUM SERPL-SCNC: 3.8 MMOL/L (ref 3.5–5)
PREALB SERPL-MCNC: 17 MG/DL (ref 20–40)
RBC # BLD AUTO: 4.89 M/UL (ref 3.8–5.8)
SODIUM SERPL-SCNC: 144 MMOL/L (ref 132–146)
WBC OTHER # BLD: 5.9 K/UL (ref 4.5–11.5)

## 2024-10-11 PROCEDURE — 87081 CULTURE SCREEN ONLY: CPT

## 2024-10-11 PROCEDURE — 85027 COMPLETE CBC AUTOMATED: CPT

## 2024-10-11 PROCEDURE — 80048 BASIC METABOLIC PNL TOTAL CA: CPT

## 2024-10-11 PROCEDURE — 84134 ASSAY OF PREALBUMIN: CPT

## 2024-10-11 PROCEDURE — 36415 COLL VENOUS BLD VENIPUNCTURE: CPT

## 2024-10-11 RX ORDER — FENTANYL CITRATE 50 UG/ML
100 INJECTION, SOLUTION INTRAMUSCULAR; INTRAVENOUS ONCE
OUTPATIENT
Start: 2024-10-11 | End: 2024-10-11

## 2024-10-11 RX ORDER — DEXAMETHASONE SODIUM PHOSPHATE 10 MG/ML
4 INJECTION, SOLUTION INTRAMUSCULAR; INTRAVENOUS ONCE
OUTPATIENT
Start: 2024-10-11 | End: 2024-10-11

## 2024-10-11 RX ORDER — ROPIVACAINE HYDROCHLORIDE 5 MG/ML
20 INJECTION, SOLUTION EPIDURAL; INFILTRATION; PERINEURAL
OUTPATIENT
Start: 2024-10-11 | End: 2024-10-12

## 2024-10-11 RX ORDER — MIDAZOLAM HYDROCHLORIDE 2 MG/2ML
1 INJECTION, SOLUTION INTRAMUSCULAR; INTRAVENOUS EVERY 5 MIN PRN
OUTPATIENT
Start: 2024-10-11

## 2024-10-11 RX ORDER — LIDOCAINE HYDROCHLORIDE 10 MG/ML
10 INJECTION, SOLUTION INFILTRATION; PERINEURAL
OUTPATIENT
Start: 2024-10-11 | End: 2024-10-12

## 2024-10-11 ASSESSMENT — PAIN DESCRIPTION - ORIENTATION: ORIENTATION: RIGHT

## 2024-10-11 ASSESSMENT — KOOS JR
RISING FROM SITTING: MODERATE
STRAIGHTENING KNEE FULLY: MILD
STANDING UPRIGHT: MILD
TWISING OR PIVOTING ON KNEE: MODERATE
KOOS JR TOTAL INTERVAL SCORE: 57.14
HOW SEVERE IS YOUR KNEE STIFFNESS AFTER FIRST WAKING IN MORNING: MILD
GOING UP OR DOWN STAIRS: MODERATE
BENDING TO THE FLOOR TO PICK UP OBJECT: SEVERE

## 2024-10-11 ASSESSMENT — PAIN DESCRIPTION - PAIN TYPE: TYPE: CHRONIC PAIN

## 2024-10-11 ASSESSMENT — PAIN DESCRIPTION - FREQUENCY: FREQUENCY: INTERMITTENT

## 2024-10-11 ASSESSMENT — PAIN SCALES - GENERAL: PAINLEVEL_OUTOF10: 5

## 2024-10-11 ASSESSMENT — PAIN DESCRIPTION - ONSET: ONSET: ON-GOING

## 2024-10-11 ASSESSMENT — PAIN DESCRIPTION - LOCATION: LOCATION: KNEE

## 2024-10-11 NOTE — TELEPHONE ENCOUNTER
Patient called to report that he does not have a cardio.   Patient needs a new order for the echo.   Patient would like this office to find him a cardiologist by Tuesday.   Please advise

## 2024-10-11 NOTE — ANESTHESIA PRE PROCEDURE
Department of Anesthesiology  Preprocedure Note       Name:  José Antonio Lara   Age:  54 y.o.  :  1970                                          MRN:  [id number]         Date:  10/11/2024      Surgeon: Surgeon(s):  Klaus Abdul DO    Procedure: ROBOTIC NATHAN ASSISTED RIGHT KNEE TOTAL JOINT ARTHROPLASTY    +++SEGUN+++   ++PNB++ (Right)    Medications prior to admission:   Prior to Admission medications    Medication Sig Start Date End Date Taking? Authorizing Provider   fluticasone (FLONASE) 50 MCG/ACT nasal spray 1 spray by Nasal route daily 8/2/24 10/1/24  Dejuan Mccormick DO   meloxicam (MOBIC) 7.5 MG tablet Take 1 tablet by mouth daily 24   Aleja Ferrari APRN - CNP   Potassium 99 MG TABS Take 1 tablet by mouth daily    Gillian Griffiths MD   lisinopril-hydroCHLOROthiazide (PRINZIDE;ZESTORETIC) 10-12.5 MG per tablet Take 1 tablet by mouth nightly 1 PILL BY MOUTH DAILY 3/25/24   Triston Humphrey MD   B Complex Vitamins (VITAMIN B COMPLEX PO) Take by mouth    Gillian Griffiths MD   VITAMIN D PO Take by mouth    Gillian Griffiths MD   ferrous sulfate 325 (65 Fe) MG tablet Take 1 tablet by mouth daily (with breakfast)    ProviderGillian MD       Current medications:    Current Outpatient Medications   Medication Sig Dispense Refill    fluticasone (FLONASE) 50 MCG/ACT nasal spray 1 spray by Nasal route daily 3 each 1    meloxicam (MOBIC) 7.5 MG tablet Take 1 tablet by mouth daily 30 tablet 3    Potassium 99 MG TABS Take 1 tablet by mouth daily      lisinopril-hydroCHLOROthiazide (PRINZIDE;ZESTORETIC) 10-12.5 MG per tablet Take 1 tablet by mouth nightly 1 PILL BY MOUTH DAILY 90 tablet 1    B Complex Vitamins (VITAMIN B COMPLEX PO) Take by mouth      VITAMIN D PO Take by mouth      ferrous sulfate 325 (65 Fe) MG tablet Take 1 tablet by mouth daily (with breakfast)       No current facility-administered medications for this encounter.       Allergies:  No Known Allergies    Problem List:

## 2024-10-11 NOTE — PROGRESS NOTES
Dr. Abdul office called stating they received the message on pt BMI from today 10/11/24. They will review with Dr. Abdul and that pt still needs cardiac clearance. Dr. Abdul office to call us back Monday 10/14/24.

## 2024-10-11 NOTE — TELEPHONE ENCOUNTER
Spoke w/ patient and relayed information. He will call Dr Nolasco's office to schedule cardiac clearance appointment.

## 2024-10-11 NOTE — TELEPHONE ENCOUNTER
Patient Appointment Form:  scheduled from referral        PCP: Dr. Triston Humphrey  Referring: Dr. Triston Humphrey     Has the Patient:     Seen a Cardiologist? No    date: YEAR  Physician:  ____  location: ____     Had a heart catheterization?  No  date: Year  Hospital:  ______     Had heart surgery?  No     Had a stress test or nuclear stress test?  No   date: YEAR   facility name: ________     Had an echocardiogram? No    date:    facility name: [facility]     Had a vascular ultrasound?      Had a 24/48 heart monitor or extended cardiac event monitor? No: 24 Hour, 48 Hour, extended cardiac event/MCOT monitor, other    date: YEAR      Who ordered:  ____     Had recent blood work in the last 6 months? Yes     date: 10/11/24    ordering physician: Dr. Triston Humphrey     Had a pacemaker/ICD/ILR implant? No: ICD, ILR, pacemaker    device company:      Seen an Electrophysiologist?  No  date: _ / _/___   physician:  ______   location: ______  Had a cardiac ablation?  Yes: Date, Dr ___, Location or No           Will send records via: no records- No hx        Date & time of appointment   11/21/24 at 8:00 am

## 2024-10-11 NOTE — PROGRESS NOTES
[facility] PRE-ADMISSION TESTING INSTRUCTIONS    The Preadmission Testing patient is instructed accordingly using the following criteria (check applicable):    ARRIVAL INSTRUCTIONS:  [x] Parking the day of Surgery is located in the Main Entrance lot.  Upon entering the door, make an immediate right to the surgery reception desk    [x] Bring photo ID and insurance card    [] Bring in a copy of Living will or Durable Power of  papers.    [x] Please be sure to arrange for responsible adult to provide transportation to and from the hospital    [x] Please arrange for responsible adult to be with you for the 24 hour period post procedure due to having anesthesia    [x] If you awake am of surgery not feeling well or have temperature >100 please call [phone]    GENERAL INSTRUCTIONS:    [x] Follow instructions for hydration that have been provided to you at your Pre-Admission Visit. Solid food until midnight then clear liquids. No gum, candy or mints.    [x] You may brush your teeth, but do not swallow any water    [] Take medications as instructed with 1-2 oz of water    [x] Stop herbal supplements and vitamins 5 days prior to procedure    [] Follow preop dosing of blood thinners per physician instructions    [] Take 1/2 dose of evening insulin, but no insulin after midnight    [] No oral diabetic medications after midnight    [] If diabetic and have low blood sugar or feel symptomatic, take 1-2oz apple juice only    [] Bring inhalers day of surgery    [x] Bring C-PAP/ Bi-Pap day of surgery    [] Bring urine specimen day of surgery    [x] Shower or bath with soap, lather and rinse well, AM of Surgery, no lotion, powders or creams to surgical site    [] Follow bowel prep as instructed per surgeon    [x] No tobacco products within 24 hours of surgery     [x] No alcohol or illegal drug use within 24 hours of surgery.    [x] Jewelry, body piercing's, eyeglasses, contact lenses and

## 2024-10-11 NOTE — TELEPHONE ENCOUNTER
Spoke w/ José Antonio and provided clarification, even though the echocardiogram was not approved by his insurance, he can still have an appointment for cardiac clearance with a cardiologist. Dr Humphrey's intention in ordering the echo was that the cardiologist would have the result in time for his cardiac clearance appointment. However, the echocardiogram alone would not have allowed him to circumvent a consult with cardiology for cardiac clearance.   José Antonio was unaware that Dr Nolasco's office had reached out to him to schedule an appointment for cardiac clearance. Phone number to Dr Nolasco's office was provided today. José Antonio will call to schedule with Dr Cardenas's office.

## 2024-10-11 NOTE — DISCHARGE INSTRUCTIONS
[facility] PRE-ADMISSION TESTING INSTRUCTIONS     The Preadmission Testing patient is instructed accordingly using the following criteria (check applicable):     ARRIVAL INSTRUCTIONS:  [x] Parking the day of Surgery is located in the Main Entrance lot.  Upon entering the door, make an immediate right to the surgery reception desk     [x] Bring photo ID and insurance card     [] Bring in a copy of Living will or Durable Power of  papers.     [x] Please be sure to arrange for responsible adult to provide transportation to and from the hospital     [x] Please arrange for responsible adult to be with you for the 24 hour period post procedure due to having anesthesia     [x] If you awake am of surgery not feeling well or have temperature >100 please call [phone]     GENERAL INSTRUCTIONS:     [x] Follow instructions for hydration that have been provided to you at your Pre-Admission Visit. Solid food until midnight then clear liquids. No gum, candy or mints.     [x] You may brush your teeth, but do not swallow any water     [] Take medications as instructed with 1-2 oz of water     [x] Stop herbal supplements and vitamins 5 days prior to procedure     [] Follow preop dosing of blood thinners per physician instructions     [] Take 1/2 dose of evening insulin, but no insulin after midnight     [] No oral diabetic medications after midnight     [] If diabetic and have low blood sugar or feel symptomatic, take 1-2oz apple juice only     [] Bring inhalers day of surgery     [x] Bring C-PAP/ Bi-Pap day of surgery     [] Bring urine specimen day of surgery     [x] Shower or bath with soap, lather and rinse well, AM of Surgery, no lotion, powders or creams to surgical site     [] Follow bowel prep as instructed per surgeon     [x] No tobacco products within 24 hours of surgery      [x] No alcohol or illegal drug use within 24 hours of surgery.     [] Jewelry, body piercing's, eyeglasses,

## 2024-10-13 LAB
MICROORGANISM SPEC CULT: NORMAL
SPECIMEN DESCRIPTION: NORMAL

## 2024-11-21 ENCOUNTER — HOSPITAL ENCOUNTER (OUTPATIENT)
Dept: CT IMAGING | Age: 54
Discharge: HOME | End: 2024-11-23
Attending: FAMILY MEDICINE
Payer: COMMERCIAL

## 2024-11-21 ENCOUNTER — OFFICE VISIT (OUTPATIENT)
Dept: CARDIOLOGY CLINIC | Age: 54
End: 2024-11-21

## 2024-11-21 VITALS
DIASTOLIC BLOOD PRESSURE: 69 MMHG | TEMPERATURE: 97.8 F | OXYGEN SATURATION: 96 % | SYSTOLIC BLOOD PRESSURE: 136 MMHG | BODY MASS INDEX: 41.75 KG/M2 | WEIGHT: 315 LBS | HEIGHT: 73 IN | RESPIRATION RATE: 18 BRPM | HEART RATE: 60 BPM

## 2024-11-21 DIAGNOSIS — M17.11 OSTEOARTHRITIS OF RIGHT KNEE, UNSPECIFIED OSTEOARTHRITIS TYPE: ICD-10-CM

## 2024-11-21 DIAGNOSIS — I44.0 FIRST DEGREE ATRIOVENTRICULAR BLOCK: ICD-10-CM

## 2024-11-21 DIAGNOSIS — Z01.810 PREOP CARDIOVASCULAR EXAM: Primary | ICD-10-CM

## 2024-11-21 DIAGNOSIS — R10.31 RIGHT LOWER QUADRANT ABDOMINAL PAIN: ICD-10-CM

## 2024-11-21 DIAGNOSIS — R00.1 SINUS BRADYCARDIA: ICD-10-CM

## 2024-11-21 PROBLEM — G47.33 OSA (OBSTRUCTIVE SLEEP APNEA): Status: ACTIVE | Noted: 2024-11-21

## 2024-11-21 PROCEDURE — 74150 CT ABDOMEN W/O CONTRAST: CPT

## 2024-11-21 NOTE — PROGRESS NOTES
oz)   SpO2 96%   BMI 43.22 kg/m²   Constitutional: Oriented to person, place, and time. Obese No distress.    Neck: No carotid bruit, no JVD present.  Cardiovascular: Normal rate, regular rhythm, normal heart sounds and intact distal pulses.   No murmur heard.  Pulmonary/Chest: Effort normal and breath sounds normal.    Abdominal: Soft. Bowel sounds are normal.  Musculoskeletal: No edema   Neurological: Alert and oriented to person, place, and time.   Skin: Skin is warm and dry.   Psychiatric: Normal mood and affect. Behavior is normal.     EKG:  sinus bradycardia, 1st degree AV block.    ASSESSMENT AND PLAN:  Patient Active Problem List   Diagnosis    Vitamin D deficiency    Thiamin deficiency    Tubulovillous adenoma of colon    Hyperlipidemia    Essential hypertension    Osteoarthritis of right knee    Sinus bradycardia    First degree atrioventricular block        Patient has no high risk clinical predictors of an adverse outcome in surgery, such as recent myocardial infarction, unstable angina, heart failure, rhythm other than sinus, severe obstructive valvular disease,cva, insulin, ckd  Able to achieve 4 METS with AODLs  Normal CV exam and EKG without Q waves - sinus ernestine likely due to JEN - he knows he needs to see Sleep Medicine for titration  RCRI Class I risk (< 1% chance of cardiac complication).       Wes Nolasco M.D  [facility] Cardiology

## 2024-11-26 ENCOUNTER — OFFICE VISIT (OUTPATIENT)
Dept: FAMILY MEDICINE CLINIC | Age: 54
End: 2024-11-26
Payer: COMMERCIAL

## 2024-11-26 VITALS
SYSTOLIC BLOOD PRESSURE: 124 MMHG | BODY MASS INDEX: 41.75 KG/M2 | HEIGHT: 73 IN | TEMPERATURE: 97 F | RESPIRATION RATE: 16 BRPM | HEART RATE: 58 BPM | OXYGEN SATURATION: 97 % | DIASTOLIC BLOOD PRESSURE: 76 MMHG | WEIGHT: 315 LBS

## 2024-11-26 DIAGNOSIS — Z01.818 PRE-OP EXAM: Primary | ICD-10-CM

## 2024-11-26 DIAGNOSIS — I10 ESSENTIAL HYPERTENSION: ICD-10-CM

## 2024-11-26 LAB
ALBUMIN: 3.9 G/DL (ref 3.5–5.2)
ALP BLD-CCNC: 57 U/L (ref 40–129)
ALT SERPL-CCNC: 14 U/L (ref 0–40)
ANION GAP SERPL CALCULATED.3IONS-SCNC: 10 MMOL/L (ref 7–16)
AST SERPL-CCNC: 21 U/L (ref 0–39)
BASOPHILS ABSOLUTE: 0.05 K/UL (ref 0–0.2)
BASOPHILS RELATIVE PERCENT: 1 % (ref 0–2)
BILIRUB SERPL-MCNC: 0.8 MG/DL (ref 0–1.2)
BUN BLDV-MCNC: 16 MG/DL (ref 6–20)
CALCIUM SERPL-MCNC: 9.2 MG/DL (ref 8.6–10.2)
CHLORIDE BLD-SCNC: 109 MMOL/L (ref 98–107)
CO2: 28 MMOL/L (ref 22–29)
CREAT SERPL-MCNC: 1 MG/DL (ref 0.7–1.2)
EOSINOPHILS ABSOLUTE: 0.13 K/UL (ref 0.05–0.5)
EOSINOPHILS RELATIVE PERCENT: 2 % (ref 0–6)
GFR, ESTIMATED: >90 ML/MIN/1.73M2
GLUCOSE BLD-MCNC: 73 MG/DL (ref 74–99)
HCT VFR BLD CALC: 52.8 % (ref 37–54)
HEMOGLOBIN: 16.9 G/DL (ref 12.5–16.5)
IMMATURE GRANULOCYTES %: 0 % (ref 0–5)
IMMATURE GRANULOCYTES ABSOLUTE: 0.03 K/UL (ref 0–0.58)
LYMPHOCYTES ABSOLUTE: 1.69 K/UL (ref 1.5–4)
LYMPHOCYTES RELATIVE PERCENT: 23 % (ref 20–42)
MCH RBC QN AUTO: 31.1 PG (ref 26–35)
MCHC RBC AUTO-ENTMCNC: 32 G/DL (ref 32–34.5)
MCV RBC AUTO: 97.2 FL (ref 80–99.9)
MONOCYTES ABSOLUTE: 0.8 K/UL (ref 0.1–0.95)
MONOCYTES RELATIVE PERCENT: 11 % (ref 2–12)
NEUTROPHILS ABSOLUTE: 4.81 K/UL (ref 1.8–7.3)
NEUTROPHILS RELATIVE PERCENT: 64 % (ref 43–80)
PDW BLD-RTO: 12.8 % (ref 11.5–15)
PLATELET # BLD: 202 K/UL (ref 130–450)
PMV BLD AUTO: 10.4 FL (ref 7–12)
POTASSIUM SERPL-SCNC: 4.1 MMOL/L (ref 3.5–5)
RBC # BLD: 5.43 M/UL (ref 3.8–5.8)
SODIUM BLD-SCNC: 147 MMOL/L (ref 132–146)
TOTAL PROTEIN: 6.4 G/DL (ref 6.4–8.3)
WBC # BLD: 7.5 K/UL (ref 4.5–11.5)

## 2024-11-26 PROCEDURE — 99213 OFFICE O/P EST LOW 20 MIN: CPT | Performed by: FAMILY MEDICINE

## 2024-11-26 PROCEDURE — 3078F DIAST BP <80 MM HG: CPT | Performed by: FAMILY MEDICINE

## 2024-11-26 PROCEDURE — 3074F SYST BP LT 130 MM HG: CPT | Performed by: FAMILY MEDICINE

## 2024-11-26 RX ORDER — LISINOPRIL AND HYDROCHLOROTHIAZIDE 10; 12.5 MG/1; MG/1
1 TABLET ORAL NIGHTLY
Qty: 90 TABLET | Refills: 1 | Status: SHIPPED | OUTPATIENT
Start: 2024-11-26

## 2024-11-26 RX ORDER — FLUTICASONE PROPIONATE 50 MCG
1 SPRAY, SUSPENSION (ML) NASAL DAILY
Qty: 3 EACH | Refills: 1 | Status: SHIPPED | OUTPATIENT
Start: 2024-11-26 | End: 2024-12-26

## 2024-11-26 ASSESSMENT — ENCOUNTER SYMPTOMS
VOMITING: 0
RHINORRHEA: 0
APNEA: 0
COLOR CHANGE: 0
SINUS PRESSURE: 0
FACIAL SWELLING: 0
CONSTIPATION: 0
PHOTOPHOBIA: 0
COUGH: 0
BLOOD IN STOOL: 0
CHEST TIGHTNESS: 0
ABDOMINAL DISTENTION: 0
SHORTNESS OF BREATH: 0
SINUS PAIN: 0
DIARRHEA: 0

## 2024-11-26 NOTE — PROGRESS NOTES
José Antonio Lara is a 54 y.o. male accompanied by himself   CC:   Chief Complaint   Patient presents with    Pre-op Exam     Surgery on right knee 12/16       History of Present Illness  The patient is a 54-year-old male here for a presurgical clearance with Dr. Abdul    Active Lifestyle  He maintains an active lifestyle, walking between 6 to 12 miles daily. His work involves lifting a 35-pound katie every half hour, and he also engages in daily dumbbell exercises at home.    Weight Fluctuations  His weight has been fluctuating, but he has managed to reduce it to under 225 pounds. His weight today is 323 pounds, down from 330 pounds during his last visit. He started his current job in 10/2023, at which time he weighed 370 pounds. His BMI is currently around 43.  - Onset: Weight fluctuations noted since starting his current job in 10/2023.  - Duration: Weight has been fluctuating over the past year.  - Character: Weight today is 323 pounds, down from 330 pounds during his last visit; initial weight was 370 pounds.  - Severity: BMI is currently around 43.    Patient's past medical, surgical, social and/or family history reviewed, updated in chart, and are non-contributory (unless otherwise stated).  Medications and allergies also reviewed and updated in chart.      /76 (Site: Left Upper Arm, Position: Sitting, Cuff Size: Large Adult)   Pulse 58   Temp 97 °F (36.1 °C) (Temporal)   Resp 16   Ht 1.854 m (6' 1\")   Wt (!) 146.9 kg (323 lb 14.4 oz)   SpO2 97%   BMI 42.73 kg/m²     Review of Systems   Constitutional:  Negative for chills, fatigue and fever.   HENT:  Negative for congestion, ear pain, facial swelling, rhinorrhea, sinus pressure and sinus pain.    Eyes:  Negative for photophobia and visual disturbance.   Respiratory:  Negative for apnea, cough, chest tightness and shortness of breath.    Cardiovascular:  Negative for chest pain and palpitations.   Gastrointestinal:  Negative for abdominal

## 2024-12-02 ENCOUNTER — PATIENT MESSAGE (OUTPATIENT)
Dept: FAMILY MEDICINE CLINIC | Age: 54
End: 2024-12-02

## 2024-12-05 ENCOUNTER — OFFICE VISIT (OUTPATIENT)
Dept: ORTHOPEDIC SURGERY | Age: 54
End: 2024-12-05

## 2024-12-05 VITALS
OXYGEN SATURATION: 98 % | SYSTOLIC BLOOD PRESSURE: 156 MMHG | HEART RATE: 71 BPM | TEMPERATURE: 97.7 F | DIASTOLIC BLOOD PRESSURE: 88 MMHG | RESPIRATION RATE: 20 BRPM

## 2024-12-05 DIAGNOSIS — M17.11 PRIMARY OSTEOARTHRITIS OF RIGHT KNEE: Primary | ICD-10-CM

## 2024-12-05 NOTE — PROGRESS NOTES
CHIEF COMPLAINT:   Chief Complaint   Patient presents with    Follow-up     Preop rt tka 12/16/24      HPI update 12/5/2024: José Antonio is here today for his preoperative appointment for a right total knee arthroplasty.  He has failed extensive conservative treatment.  He has lost significant weight and has pain that interferes with activities of daily living.    HPI update 8/1/2024: José Antonio is here today for bilateral knee osteoarthritis. Previous injection to the left knee in April which provided him relief up until recently. He also had injection to his right knee in March which provided him some relief. At this point he is exhausted from injections and the pain and would like to discuss surgery. Both knees hurt equally 6/10. He denies numbness and tingling. He denies recent injury. He is employed at a factory.    HPI update 4/25/2024: José Antonio is here today for left knee osteoarthritis.  He is receiving good relief from his injections.  He would like to repeat these today.  At his last visit we gave him a brace.  He has had no recent injuries.  Denies fever and chills.  Denies numbness and tingling.    HPI update 1/18/2024: José Antonio is here today for left knee pain.  Reduction repeat injection.  He good relief after his injection in October.  He would also like a brace.  He states that he fell onto his hands and knees a few days ago and this has been progressively getting better.  He has had an exacerbation of his knee arthritis with medial sided pain that is worse activity.  He has difficulty with his physical job.    HPI:    José Antonio Lara is a 54 y.o. year old male with left knee pain.  He woke up and it started hurting spontaneously and this has been going on for the past month.  He did get x-rays on 9/18/2023 which revealed arthritis.  He was referred here for further care.  His BMI is 50.  He has not had any recent injuries.  Denies mechanical symptoms.  He has activity related knee pain that is sharp and nonradiating.

## 2024-12-09 ENCOUNTER — TELEPHONE (OUTPATIENT)
Dept: ORTHOPEDIC SURGERY | Age: 54
End: 2024-12-09

## 2024-12-10 ENCOUNTER — HOSPITAL ENCOUNTER (OUTPATIENT)
Dept: PREADMISSION TESTING | Age: 54
Discharge: HOME | End: 2024-12-10
Payer: COMMERCIAL

## 2024-12-10 VITALS
TEMPERATURE: 98 F | BODY MASS INDEX: 41.75 KG/M2 | RESPIRATION RATE: 18 BRPM | DIASTOLIC BLOOD PRESSURE: 66 MMHG | OXYGEN SATURATION: 97 % | SYSTOLIC BLOOD PRESSURE: 135 MMHG | WEIGHT: 315 LBS | HEIGHT: 73 IN | HEART RATE: 52 BPM

## 2024-12-10 DIAGNOSIS — Z01.818 PRE-OP EVALUATION: ICD-10-CM

## 2024-12-10 LAB
ANION GAP SERPL CALCULATED.3IONS-SCNC: 9 MMOL/L (ref 7–16)
BASOPHILS # BLD: 0.04 K/UL (ref 0–0.2)
BASOPHILS NFR BLD: 1 % (ref 0–2)
BUN SERPL-MCNC: 16 MG/DL (ref 6–20)
CALCIUM SERPL-MCNC: 8.6 MG/DL (ref 8.6–10.2)
CHLORIDE SERPL-SCNC: 113 MMOL/L (ref 98–107)
CO2 SERPL-SCNC: 25 MMOL/L (ref 22–29)
CREAT SERPL-MCNC: 0.9 MG/DL (ref 0.7–1.2)
EOSINOPHIL # BLD: 0.13 K/UL (ref 0.05–0.5)
EOSINOPHILS RELATIVE PERCENT: 2 % (ref 0–6)
ERYTHROCYTE [DISTWIDTH] IN BLOOD BY AUTOMATED COUNT: 12.9 % (ref 11.5–15)
GFR, ESTIMATED: >90 ML/MIN/1.73M2
GLUCOSE SERPL-MCNC: 100 MG/DL (ref 74–99)
HCT VFR BLD AUTO: 47.1 % (ref 37–54)
HGB BLD-MCNC: 15.7 G/DL (ref 12.5–16.5)
IMM GRANULOCYTES # BLD AUTO: 0.03 K/UL (ref 0–0.58)
IMM GRANULOCYTES NFR BLD: 0 % (ref 0–5)
LYMPHOCYTES NFR BLD: 1.36 K/UL (ref 1.5–4)
LYMPHOCYTES RELATIVE PERCENT: 19 % (ref 20–42)
MCH RBC QN AUTO: 31.2 PG (ref 26–35)
MCHC RBC AUTO-ENTMCNC: 33.3 G/DL (ref 32–34.5)
MCV RBC AUTO: 93.6 FL (ref 80–99.9)
MONOCYTES NFR BLD: 0.68 K/UL (ref 0.1–0.95)
MONOCYTES NFR BLD: 10 % (ref 2–12)
NEUTROPHILS NFR BLD: 68 % (ref 43–80)
NEUTS SEG NFR BLD: 4.79 K/UL (ref 1.8–7.3)
PLATELET # BLD AUTO: 171 K/UL (ref 130–450)
PMV BLD AUTO: 10 FL (ref 7–12)
POTASSIUM SERPL-SCNC: 3.9 MMOL/L (ref 3.5–5)
PREALB SERPL-MCNC: 16 MG/DL (ref 20–40)
RBC # BLD AUTO: 5.03 M/UL (ref 3.8–5.8)
SODIUM SERPL-SCNC: 147 MMOL/L (ref 132–146)
WBC OTHER # BLD: 7 K/UL (ref 4.5–11.5)

## 2024-12-10 PROCEDURE — 80048 BASIC METABOLIC PNL TOTAL CA: CPT

## 2024-12-10 PROCEDURE — 36415 COLL VENOUS BLD VENIPUNCTURE: CPT

## 2024-12-10 PROCEDURE — 87081 CULTURE SCREEN ONLY: CPT

## 2024-12-10 PROCEDURE — 84134 ASSAY OF PREALBUMIN: CPT

## 2024-12-10 PROCEDURE — 85025 COMPLETE CBC W/AUTO DIFF WBC: CPT

## 2024-12-10 ASSESSMENT — PAIN DESCRIPTION - PAIN TYPE: TYPE: CHRONIC PAIN

## 2024-12-10 ASSESSMENT — KOOS JR
BENDING TO THE FLOOR TO PICK UP OBJECT: MODERATE
RISING FROM SITTING: MODERATE
STANDING UPRIGHT: MODERATE
KOOS JR TOTAL INTERVAL SCORE: 52.465
GOING UP OR DOWN STAIRS: MODERATE
TWISING OR PIVOTING ON KNEE: SEVERE
STRAIGHTENING KNEE FULLY: MILD
HOW SEVERE IS YOUR KNEE STIFFNESS AFTER FIRST WAKING IN MORNING: MODERATE

## 2024-12-10 ASSESSMENT — PAIN DESCRIPTION - FREQUENCY: FREQUENCY: CONTINUOUS

## 2024-12-10 ASSESSMENT — PAIN SCALES - GENERAL: PAINLEVEL_OUTOF10: 5

## 2024-12-10 ASSESSMENT — PAIN DESCRIPTION - ORIENTATION: ORIENTATION: RIGHT

## 2024-12-10 ASSESSMENT — PAIN DESCRIPTION - LOCATION: LOCATION: KNEE

## 2024-12-10 NOTE — PROGRESS NOTES
[facility] PRE-ADMISSION TESTING INSTRUCTIONS    The Preadmission Testing patient is instructed accordingly using the following criteria (check applicable):    ARRIVAL INSTRUCTIONS:  [x] Parking the day of Surgery is located in the Main Entrance lot.  Upon entering at Entrance A, make an immediate right to the surgery reception desk    [x] Bring photo ID and insurance card    [x] Bring in a copy of Living will or Durable Power of  papers.    [x] Please be sure to arrange for responsible adult to provide transportation to and from the hospital    [x] Please arrange for responsible adult to be with you for the 24 hour period post procedure due to having anesthesia    [x] If you awake morning of surgery not feeling well or have temperature >100 please call [phone]    GENERAL INSTRUCTIONS:    [x] Follow instructions for hydration that have been provided to you at your Pre-Admission Visit. Solid food until midnight then clear liquids. No gum, candy or mints.    [x] You may brush your teeth, but do not swallow any water    [x] Stop herbal supplements and vitamins 5 days prior to procedure - LAST DOSE 12/10/2024    [x] Follow preop dosing of MOBIC / MELOXICAM per physician instructions    [x] No tobacco products within 24 hours of surgery     [x] No alcohol or illegal drug use within 24 hours of surgery.    [x] Jewelry, body piercing's, eyeglasses, contact lenses and dentures are not permitted into surgery (bring cases)      [x] Please do not wear any nail polish, make up or hair products on the day of surgery    [x] You can expect a call the business day prior to procedure to notify you if your arrival time changes    [x] If you receive a survey after surgery we would greatly appreciate your comments    [x] Please notify surgeon if you develop any illness between now and time of surgery (cold, cough, sore throat, fever, nausea, vomiting) or any signs of infections  including skin,

## 2024-12-10 NOTE — DISCHARGE INSTRUCTIONS
Stephanie Brantley, RN  Registered Nurse     Progress Notes     Signed     Date of Service: 12/10/2024  8:00 AM     Signed         [facility] PRE-ADMISSION TESTING INSTRUCTIONS     The Preadmission Testing patient is instructed accordingly using the following criteria (check applicable):     ARRIVAL INSTRUCTIONS:  [x] Parking the day of Surgery is located in the Main Entrance lot.  Upon entering at Entrance A, make an immediate right to the surgery reception desk     [x] Bring photo ID and insurance card     [x] Bring in a copy of Living will or Durable Power of  papers.     [x] Please be sure to arrange for responsible adult to provide transportation to and from the hospital     [x] Please arrange for responsible adult to be with you for the 24 hour period post procedure due to having anesthesia     [x] If you awake morning of surgery not feeling well or have temperature >100 please call [phone]     GENERAL INSTRUCTIONS:     [x] Follow instructions for hydration that have been provided to you at your Pre-Admission Visit. Solid food until midnight then clear liquids. No gum, candy or mints.     [x] You may brush your teeth, but do not swallow any water     [x] Stop herbal supplements and vitamins 5 days prior to procedure - LAST DOSE 12/10/2024     [x] Follow preop dosing of MOBIC / MELOXICAM per physician instructions     [x] No tobacco products within 24 hours of surgery      [x] No alcohol or illegal drug use within 24 hours of surgery.     [x] Jewelry, body piercing's, eyeglasses, contact lenses and dentures are not permitted into surgery (bring cases)                            [x] Please do not wear any nail polish, make up or hair products on the day of surgery     [x] You can expect a call the business day prior to procedure to notify you if your arrival time changes     [x] If you receive a survey after surgery we would greatly appreciate your comments     [x] Please

## 2024-12-10 NOTE — PROGRESS NOTES
Spoke to Milagros at Dr Abdul's office - requested she have Dr Abdul review CBC, BMP, Pre-Albumin results dated 12/10/2024. Milagros stated she would do so.    Also Milagros stated she has requested the medical clearance from Dr Humphrey

## 2024-12-11 LAB
MICROORGANISM SPEC CULT: NORMAL
SPECIMEN DESCRIPTION: NORMAL

## 2024-12-16 ENCOUNTER — APPOINTMENT (OUTPATIENT)
Dept: GENERAL RADIOLOGY | Age: 54
End: 2024-12-16
Attending: STUDENT IN AN ORGANIZED HEALTH CARE EDUCATION/TRAINING PROGRAM
Payer: COMMERCIAL

## 2024-12-16 ENCOUNTER — HOSPITAL ENCOUNTER (OUTPATIENT)
Age: 54
Setting detail: OBSERVATION
Discharge: HOME | End: 2024-12-16
Attending: STUDENT IN AN ORGANIZED HEALTH CARE EDUCATION/TRAINING PROGRAM | Admitting: STUDENT IN AN ORGANIZED HEALTH CARE EDUCATION/TRAINING PROGRAM
Payer: COMMERCIAL

## 2024-12-16 ENCOUNTER — ANESTHESIA (OUTPATIENT)
Dept: OPERATING ROOM | Age: 54
End: 2024-12-16
Payer: COMMERCIAL

## 2024-12-16 VITALS
RESPIRATION RATE: 17 BRPM | SYSTOLIC BLOOD PRESSURE: 160 MMHG | BODY MASS INDEX: 41.75 KG/M2 | WEIGHT: 315 LBS | OXYGEN SATURATION: 93 % | TEMPERATURE: 96.5 F | HEIGHT: 73 IN | HEART RATE: 59 BPM | DIASTOLIC BLOOD PRESSURE: 81 MMHG

## 2024-12-16 DIAGNOSIS — M17.11 OSTEOARTHRITIS OF RIGHT KNEE: ICD-10-CM

## 2024-12-16 DIAGNOSIS — Z96.651 S/P TOTAL KNEE ARTHROPLASTY, RIGHT: ICD-10-CM

## 2024-12-16 DIAGNOSIS — Z01.818 PRE-OP EVALUATION: Primary | ICD-10-CM

## 2024-12-16 PROCEDURE — 3600000015 HC SURGERY LEVEL 5 ADDTL 15MIN: Performed by: STUDENT IN AN ORGANIZED HEALTH CARE EDUCATION/TRAINING PROGRAM

## 2024-12-16 PROCEDURE — 3700000000 HC ANESTHESIA ATTENDED CARE: Performed by: STUDENT IN AN ORGANIZED HEALTH CARE EDUCATION/TRAINING PROGRAM

## 2024-12-16 PROCEDURE — 2500000003 HC RX 250 WO HCPCS: Performed by: STUDENT IN AN ORGANIZED HEALTH CARE EDUCATION/TRAINING PROGRAM

## 2024-12-16 PROCEDURE — 88311 DECALCIFY TISSUE: CPT

## 2024-12-16 PROCEDURE — 6360000002 HC RX W HCPCS

## 2024-12-16 PROCEDURE — 6370000000 HC RX 637 (ALT 250 FOR IP)

## 2024-12-16 PROCEDURE — 2709999900 HC NON-CHARGEABLE SUPPLY: Performed by: STUDENT IN AN ORGANIZED HEALTH CARE EDUCATION/TRAINING PROGRAM

## 2024-12-16 PROCEDURE — 97161 PT EVAL LOW COMPLEX 20 MIN: CPT

## 2024-12-16 PROCEDURE — 6360000002 HC RX W HCPCS: Performed by: ANESTHESIOLOGY

## 2024-12-16 PROCEDURE — 88305 TISSUE EXAM BY PATHOLOGIST: CPT

## 2024-12-16 PROCEDURE — C1776 JOINT DEVICE (IMPLANTABLE): HCPCS | Performed by: STUDENT IN AN ORGANIZED HEALTH CARE EDUCATION/TRAINING PROGRAM

## 2024-12-16 PROCEDURE — 2580000003 HC RX 258: Performed by: STUDENT IN AN ORGANIZED HEALTH CARE EDUCATION/TRAINING PROGRAM

## 2024-12-16 PROCEDURE — 6370000000 HC RX 637 (ALT 250 FOR IP): Performed by: STUDENT IN AN ORGANIZED HEALTH CARE EDUCATION/TRAINING PROGRAM

## 2024-12-16 PROCEDURE — 7100000010 HC PHASE II RECOVERY - FIRST 15 MIN: Performed by: STUDENT IN AN ORGANIZED HEALTH CARE EDUCATION/TRAINING PROGRAM

## 2024-12-16 PROCEDURE — 6360000002 HC RX W HCPCS: Performed by: STUDENT IN AN ORGANIZED HEALTH CARE EDUCATION/TRAINING PROGRAM

## 2024-12-16 PROCEDURE — 7100000011 HC PHASE II RECOVERY - ADDTL 15 MIN: Performed by: STUDENT IN AN ORGANIZED HEALTH CARE EDUCATION/TRAINING PROGRAM

## 2024-12-16 PROCEDURE — 97530 THERAPEUTIC ACTIVITIES: CPT

## 2024-12-16 PROCEDURE — 93005 ELECTROCARDIOGRAM TRACING: CPT

## 2024-12-16 PROCEDURE — 73560 X-RAY EXAM OF KNEE 1 OR 2: CPT

## 2024-12-16 PROCEDURE — 2580000003 HC RX 258

## 2024-12-16 PROCEDURE — C1713 ANCHOR/SCREW BN/BN,TIS/BN: HCPCS | Performed by: STUDENT IN AN ORGANIZED HEALTH CARE EDUCATION/TRAINING PROGRAM

## 2024-12-16 PROCEDURE — 64447 NJX AA&/STRD FEMORAL NRV IMG: CPT | Performed by: ANESTHESIOLOGY

## 2024-12-16 PROCEDURE — 2720000010 HC SURG SUPPLY STERILE: Performed by: STUDENT IN AN ORGANIZED HEALTH CARE EDUCATION/TRAINING PROGRAM

## 2024-12-16 PROCEDURE — 3600000005 HC SURGERY LEVEL 5 BASE: Performed by: STUDENT IN AN ORGANIZED HEALTH CARE EDUCATION/TRAINING PROGRAM

## 2024-12-16 PROCEDURE — 7100000000 HC PACU RECOVERY - FIRST 15 MIN: Performed by: STUDENT IN AN ORGANIZED HEALTH CARE EDUCATION/TRAINING PROGRAM

## 2024-12-16 PROCEDURE — 3700000001 HC ADD 15 MINUTES (ANESTHESIA): Performed by: STUDENT IN AN ORGANIZED HEALTH CARE EDUCATION/TRAINING PROGRAM

## 2024-12-16 PROCEDURE — 2500000003 HC RX 250 WO HCPCS

## 2024-12-16 PROCEDURE — 7100000001 HC PACU RECOVERY - ADDTL 15 MIN: Performed by: STUDENT IN AN ORGANIZED HEALTH CARE EDUCATION/TRAINING PROGRAM

## 2024-12-16 DEVICE — CRUCIATE RETAINING FEMORAL
Type: IMPLANTABLE DEVICE | Site: KNEE | Status: FUNCTIONAL
Brand: TRIATHLON

## 2024-12-16 DEVICE — TIBIAL BEARING INSERT - CS
Type: IMPLANTABLE DEVICE | Site: KNEE | Status: FUNCTIONAL
Brand: TRIATHLON

## 2024-12-16 DEVICE — TIBIAL COMPONENT
Type: IMPLANTABLE DEVICE | Site: KNEE | Status: FUNCTIONAL
Brand: TRIATHLON

## 2024-12-16 DEVICE — PATELLA
Type: IMPLANTABLE DEVICE | Site: KNEE | Status: FUNCTIONAL
Brand: TRIATHLON

## 2024-12-16 RX ORDER — ACETAMINOPHEN 500 MG
1000 TABLET ORAL ONCE
Status: COMPLETED | OUTPATIENT
Start: 2024-12-16 | End: 2024-12-16

## 2024-12-16 RX ORDER — LIDOCAINE HYDROCHLORIDE 10 MG/ML
10 INJECTION, SOLUTION INFILTRATION; PERINEURAL
Status: DISCONTINUED | OUTPATIENT
Start: 2024-12-16 | End: 2024-12-16 | Stop reason: HOSPADM

## 2024-12-16 RX ORDER — MEPERIDINE HYDROCHLORIDE 25 MG/ML
12.5 INJECTION INTRAMUSCULAR; INTRAVENOUS; SUBCUTANEOUS EVERY 5 MIN PRN
Status: DISCONTINUED | OUTPATIENT
Start: 2024-12-16 | End: 2024-12-16 | Stop reason: HOSPADM

## 2024-12-16 RX ORDER — MIDAZOLAM HYDROCHLORIDE 2 MG/2ML
1 INJECTION, SOLUTION INTRAMUSCULAR; INTRAVENOUS EVERY 5 MIN PRN
Status: DISCONTINUED | OUTPATIENT
Start: 2024-12-16 | End: 2024-12-16 | Stop reason: HOSPADM

## 2024-12-16 RX ORDER — FENTANYL CITRATE 50 UG/ML
INJECTION, SOLUTION INTRAMUSCULAR; INTRAVENOUS
Status: DISCONTINUED | OUTPATIENT
Start: 2024-12-16 | End: 2024-12-16 | Stop reason: SDUPTHER

## 2024-12-16 RX ORDER — ASPIRIN 81 MG/1
TABLET ORAL
Status: COMPLETED
Start: 2024-12-16 | End: 2024-12-16

## 2024-12-16 RX ORDER — FIBRINOGEN HUMAN AND THROMBIN HUMAN 1 ML
KIT TOPICAL PRN
Status: DISCONTINUED | OUTPATIENT
Start: 2024-12-16 | End: 2024-12-16 | Stop reason: HOSPADM

## 2024-12-16 RX ORDER — SODIUM CHLORIDE 9 MG/ML
INJECTION, SOLUTION INTRAVENOUS PRN
Status: DISCONTINUED | OUTPATIENT
Start: 2024-12-16 | End: 2024-12-16 | Stop reason: HOSPADM

## 2024-12-16 RX ORDER — DEXAMETHASONE SODIUM PHOSPHATE 10 MG/ML
4 INJECTION, SOLUTION INTRAMUSCULAR; INTRAVENOUS ONCE
Status: DISCONTINUED | OUTPATIENT
Start: 2024-12-16 | End: 2024-12-16 | Stop reason: HOSPADM

## 2024-12-16 RX ORDER — ONDANSETRON 4 MG/1
4 TABLET, ORALLY DISINTEGRATING ORAL EVERY 8 HOURS PRN
Status: CANCELLED | OUTPATIENT
Start: 2024-12-16

## 2024-12-16 RX ORDER — TRANEXAMIC ACID 10 MG/ML
1000 INJECTION, SOLUTION INTRAVENOUS
Status: COMPLETED | OUTPATIENT
Start: 2024-12-16 | End: 2024-12-16

## 2024-12-16 RX ORDER — TRANEXAMIC ACID 10 MG/ML
INJECTION, SOLUTION INTRAVENOUS
Status: COMPLETED
Start: 2024-12-16 | End: 2024-12-16

## 2024-12-16 RX ORDER — SODIUM CHLORIDE 0.9 % (FLUSH) 0.9 %
5-40 SYRINGE (ML) INJECTION EVERY 12 HOURS SCHEDULED
Status: CANCELLED | OUTPATIENT
Start: 2024-12-16

## 2024-12-16 RX ORDER — ONDANSETRON 2 MG/ML
4 INJECTION INTRAMUSCULAR; INTRAVENOUS EVERY 6 HOURS PRN
Status: CANCELLED | OUTPATIENT
Start: 2024-12-16

## 2024-12-16 RX ORDER — CEFAZOLIN 2 G/1
INJECTION, POWDER, FOR SOLUTION INTRAMUSCULAR; INTRAVENOUS
Status: DISCONTINUED
Start: 2024-12-16 | End: 2024-12-16 | Stop reason: HOSPADM

## 2024-12-16 RX ORDER — DEXAMETHASONE SODIUM PHOSPHATE 4 MG/ML
INJECTION, SOLUTION INTRA-ARTICULAR; INTRALESIONAL; INTRAMUSCULAR; INTRAVENOUS; SOFT TISSUE
Status: DISCONTINUED | OUTPATIENT
Start: 2024-12-16 | End: 2024-12-16 | Stop reason: SDUPTHER

## 2024-12-16 RX ORDER — SUCCINYLCHOLINE/SOD CL,ISO/PF 200MG/10ML
SYRINGE (ML) INTRAVENOUS
Status: DISCONTINUED | OUTPATIENT
Start: 2024-12-16 | End: 2024-12-16 | Stop reason: SDUPTHER

## 2024-12-16 RX ORDER — ASPIRIN 81 MG/1
81 TABLET ORAL 2 TIMES DAILY
Qty: 56 TABLET | Refills: 0 | Status: SHIPPED | OUTPATIENT
Start: 2024-12-16

## 2024-12-16 RX ORDER — VANCOMYCIN HYDROCHLORIDE 1 G/20ML
INJECTION, POWDER, LYOPHILIZED, FOR SOLUTION INTRAVENOUS PRN
Status: DISCONTINUED | OUTPATIENT
Start: 2024-12-16 | End: 2024-12-16 | Stop reason: ALTCHOICE

## 2024-12-16 RX ORDER — MIDAZOLAM HYDROCHLORIDE 2 MG/2ML
2 INJECTION, SOLUTION INTRAMUSCULAR; INTRAVENOUS
Status: DISCONTINUED | OUTPATIENT
Start: 2024-12-16 | End: 2024-12-16 | Stop reason: HOSPADM

## 2024-12-16 RX ORDER — ROCURONIUM BROMIDE 10 MG/ML
INJECTION, SOLUTION INTRAVENOUS
Status: DISCONTINUED | OUTPATIENT
Start: 2024-12-16 | End: 2024-12-16 | Stop reason: SDUPTHER

## 2024-12-16 RX ORDER — SODIUM CHLORIDE 9 MG/ML
INJECTION, SOLUTION INTRAVENOUS PRN
Status: CANCELLED | OUTPATIENT
Start: 2024-12-16

## 2024-12-16 RX ORDER — WATER 10 ML/10ML
INJECTION INTRAMUSCULAR; INTRAVENOUS; SUBCUTANEOUS
Status: DISCONTINUED
Start: 2024-12-16 | End: 2024-12-16 | Stop reason: HOSPADM

## 2024-12-16 RX ORDER — MIDAZOLAM HYDROCHLORIDE 1 MG/ML
INJECTION, SOLUTION INTRAMUSCULAR; INTRAVENOUS
Status: DISCONTINUED | OUTPATIENT
Start: 2024-12-16 | End: 2024-12-16 | Stop reason: SDUPTHER

## 2024-12-16 RX ORDER — SODIUM CHLORIDE 0.9 % (FLUSH) 0.9 %
5-40 SYRINGE (ML) INJECTION PRN
Status: DISCONTINUED | OUTPATIENT
Start: 2024-12-16 | End: 2024-12-16 | Stop reason: HOSPADM

## 2024-12-16 RX ORDER — DEXAMETHASONE SODIUM PHOSPHATE 10 MG/ML
8 INJECTION, SOLUTION INTRAMUSCULAR; INTRAVENOUS ONCE
Status: DISCONTINUED | OUTPATIENT
Start: 2024-12-16 | End: 2024-12-16 | Stop reason: HOSPADM

## 2024-12-16 RX ORDER — IPRATROPIUM BROMIDE AND ALBUTEROL SULFATE 2.5; .5 MG/3ML; MG/3ML
1 SOLUTION RESPIRATORY (INHALATION)
Status: DISCONTINUED | OUTPATIENT
Start: 2024-12-16 | End: 2024-12-16 | Stop reason: HOSPADM

## 2024-12-16 RX ORDER — KETOROLAC TROMETHAMINE 15 MG/ML
15 INJECTION, SOLUTION INTRAMUSCULAR; INTRAVENOUS EVERY 6 HOURS
Status: DISCONTINUED | OUTPATIENT
Start: 2024-12-16 | End: 2024-12-16 | Stop reason: HOSPADM

## 2024-12-16 RX ORDER — SODIUM CHLORIDE 9 MG/ML
INJECTION, SOLUTION INTRAVENOUS CONTINUOUS
Status: DISCONTINUED | OUTPATIENT
Start: 2024-12-16 | End: 2024-12-16 | Stop reason: HOSPADM

## 2024-12-16 RX ORDER — SODIUM CHLORIDE 0.9 % (FLUSH) 0.9 %
5-40 SYRINGE (ML) INJECTION EVERY 12 HOURS SCHEDULED
Status: DISCONTINUED | OUTPATIENT
Start: 2024-12-16 | End: 2024-12-16 | Stop reason: HOSPADM

## 2024-12-16 RX ORDER — POVIDONE-IODINE 5 %
SOLUTION, NON-ORAL OPHTHALMIC (EYE) PRN
Status: DISCONTINUED | OUTPATIENT
Start: 2024-12-16 | End: 2024-12-16 | Stop reason: ALTCHOICE

## 2024-12-16 RX ORDER — ONDANSETRON 2 MG/ML
INJECTION INTRAMUSCULAR; INTRAVENOUS
Status: DISCONTINUED | OUTPATIENT
Start: 2024-12-16 | End: 2024-12-16 | Stop reason: SDUPTHER

## 2024-12-16 RX ORDER — ACETAMINOPHEN 325 MG/1
650 TABLET ORAL EVERY 6 HOURS
Status: DISCONTINUED | OUTPATIENT
Start: 2024-12-16 | End: 2024-12-16 | Stop reason: HOSPADM

## 2024-12-16 RX ORDER — ROPIVACAINE HYDROCHLORIDE 5 MG/ML
INJECTION, SOLUTION EPIDURAL; INFILTRATION; PERINEURAL
Status: COMPLETED | OUTPATIENT
Start: 2024-12-16 | End: 2024-12-16

## 2024-12-16 RX ORDER — SODIUM CHLORIDE 0.9 % (FLUSH) 0.9 %
5-40 SYRINGE (ML) INJECTION PRN
Status: CANCELLED | OUTPATIENT
Start: 2024-12-16

## 2024-12-16 RX ORDER — ASPIRIN 81 MG/1
TABLET, CHEWABLE ORAL
Status: DISCONTINUED
Start: 2024-12-16 | End: 2024-12-16 | Stop reason: WASHOUT

## 2024-12-16 RX ORDER — ROPIVACAINE HYDROCHLORIDE 5 MG/ML
20 INJECTION, SOLUTION EPIDURAL; INFILTRATION; PERINEURAL
Status: DISCONTINUED | OUTPATIENT
Start: 2024-12-16 | End: 2024-12-16 | Stop reason: HOSPADM

## 2024-12-16 RX ORDER — ASPIRIN 81 MG/1
81 TABLET ORAL 2 TIMES DAILY
Status: DISCONTINUED | OUTPATIENT
Start: 2024-12-16 | End: 2024-12-16 | Stop reason: HOSPADM

## 2024-12-16 RX ORDER — NALOXONE HYDROCHLORIDE 0.4 MG/ML
INJECTION, SOLUTION INTRAMUSCULAR; INTRAVENOUS; SUBCUTANEOUS PRN
Status: DISCONTINUED | OUTPATIENT
Start: 2024-12-16 | End: 2024-12-16 | Stop reason: HOSPADM

## 2024-12-16 RX ORDER — FENTANYL CITRATE 50 UG/ML
100 INJECTION, SOLUTION INTRAMUSCULAR; INTRAVENOUS ONCE
Status: COMPLETED | OUTPATIENT
Start: 2024-12-16 | End: 2024-12-16

## 2024-12-16 RX ORDER — CELECOXIB 100 MG/1
200 CAPSULE ORAL ONCE
Status: COMPLETED | OUTPATIENT
Start: 2024-12-16 | End: 2024-12-16

## 2024-12-16 RX ORDER — LIDOCAINE HYDROCHLORIDE 20 MG/ML
INJECTION, SOLUTION EPIDURAL; INFILTRATION; INTRACAUDAL; PERINEURAL
Status: DISCONTINUED | OUTPATIENT
Start: 2024-12-16 | End: 2024-12-16 | Stop reason: SDUPTHER

## 2024-12-16 RX ORDER — OXYCODONE HYDROCHLORIDE 5 MG/1
10 TABLET ORAL EVERY 4 HOURS PRN
Status: DISCONTINUED | OUTPATIENT
Start: 2024-12-16 | End: 2024-12-16 | Stop reason: HOSPADM

## 2024-12-16 RX ORDER — HYDRALAZINE HYDROCHLORIDE 20 MG/ML
10 INJECTION INTRAMUSCULAR; INTRAVENOUS
Status: DISCONTINUED | OUTPATIENT
Start: 2024-12-16 | End: 2024-12-16 | Stop reason: HOSPADM

## 2024-12-16 RX ORDER — OXYCODONE HYDROCHLORIDE 5 MG/1
5 TABLET ORAL EVERY 4 HOURS PRN
Status: DISCONTINUED | OUTPATIENT
Start: 2024-12-16 | End: 2024-12-16 | Stop reason: HOSPADM

## 2024-12-16 RX ORDER — OXYCODONE HYDROCHLORIDE 5 MG/1
5 TABLET ORAL EVERY 6 HOURS PRN
Qty: 28 TABLET | Refills: 0 | Status: SHIPPED | OUTPATIENT
Start: 2024-12-16 | End: 2024-12-23

## 2024-12-16 RX ORDER — PROPOFOL 10 MG/ML
INJECTION, EMULSION INTRAVENOUS
Status: DISCONTINUED | OUTPATIENT
Start: 2024-12-16 | End: 2024-12-16 | Stop reason: SDUPTHER

## 2024-12-16 RX ORDER — LABETALOL HYDROCHLORIDE 5 MG/ML
10 INJECTION, SOLUTION INTRAVENOUS
Status: DISCONTINUED | OUTPATIENT
Start: 2024-12-16 | End: 2024-12-16 | Stop reason: HOSPADM

## 2024-12-16 RX ORDER — ONDANSETRON 2 MG/ML
4 INJECTION INTRAMUSCULAR; INTRAVENOUS
Status: DISCONTINUED | OUTPATIENT
Start: 2024-12-16 | End: 2024-12-16 | Stop reason: HOSPADM

## 2024-12-16 RX ORDER — MORPHINE SULFATE 2 MG/ML
2 INJECTION, SOLUTION INTRAMUSCULAR; INTRAVENOUS
Status: CANCELLED | OUTPATIENT
Start: 2024-12-16

## 2024-12-16 RX ORDER — MORPHINE SULFATE 4 MG/ML
4 INJECTION, SOLUTION INTRAMUSCULAR; INTRAVENOUS
Status: CANCELLED | OUTPATIENT
Start: 2024-12-16

## 2024-12-16 RX ORDER — KETOROLAC TROMETHAMINE 30 MG/ML
INJECTION, SOLUTION INTRAMUSCULAR; INTRAVENOUS
Status: COMPLETED
Start: 2024-12-16 | End: 2024-12-16

## 2024-12-16 RX ORDER — KETOROLAC TROMETHAMINE 30 MG/ML
INJECTION, SOLUTION INTRAMUSCULAR; INTRAVENOUS
Status: DISCONTINUED | OUTPATIENT
Start: 2024-12-16 | End: 2024-12-16 | Stop reason: SDUPTHER

## 2024-12-16 RX ADMIN — ROPIVACAINE HYDROCHLORIDE 30 ML: 5 INJECTION, SOLUTION EPIDURAL; INFILTRATION; PERINEURAL at 06:45

## 2024-12-16 RX ADMIN — PROPOFOL 50 MG: 10 INJECTION, EMULSION INTRAVENOUS at 07:27

## 2024-12-16 RX ADMIN — KETOROLAC TROMETHAMINE 15 MG: 30 INJECTION, SOLUTION INTRAMUSCULAR at 12:23

## 2024-12-16 RX ADMIN — FENTANYL CITRATE 50 MCG: 50 INJECTION, SOLUTION INTRAMUSCULAR; INTRAVENOUS at 07:03

## 2024-12-16 RX ADMIN — ROCURONIUM BROMIDE 40 MG: 10 INJECTION, SOLUTION INTRAVENOUS at 07:58

## 2024-12-16 RX ADMIN — WATER 3000 MG: 1 INJECTION INTRAMUSCULAR; INTRAVENOUS; SUBCUTANEOUS at 12:37

## 2024-12-16 RX ADMIN — HYDROMORPHONE HYDROCHLORIDE 0.5 MG: 1 INJECTION, SOLUTION INTRAMUSCULAR; INTRAVENOUS; SUBCUTANEOUS at 09:14

## 2024-12-16 RX ADMIN — ASPIRIN 81 MG: 81 TABLET, COATED ORAL at 12:23

## 2024-12-16 RX ADMIN — TRANEXAMIC ACID 1000 MG: 10 INJECTION, SOLUTION INTRAVENOUS at 10:15

## 2024-12-16 RX ADMIN — ACETAMINOPHEN 650 MG: 325 TABLET ORAL at 12:23

## 2024-12-16 RX ADMIN — ASPIRIN 81 MG: 81 TABLET ORAL at 12:23

## 2024-12-16 RX ADMIN — OXYCODONE 5 MG: 5 TABLET ORAL at 11:23

## 2024-12-16 RX ADMIN — HYDROMORPHONE HYDROCHLORIDE 0.5 MG: 1 INJECTION, SOLUTION INTRAMUSCULAR; INTRAVENOUS; SUBCUTANEOUS at 09:07

## 2024-12-16 RX ADMIN — FENTANYL CITRATE 100 MCG: 50 INJECTION, SOLUTION INTRAMUSCULAR; INTRAVENOUS at 07:27

## 2024-12-16 RX ADMIN — SUGAMMADEX 50 MG: 100 INJECTION, SOLUTION INTRAVENOUS at 08:15

## 2024-12-16 RX ADMIN — ROCURONIUM BROMIDE 10 MG: 10 INJECTION, SOLUTION INTRAVENOUS at 07:03

## 2024-12-16 RX ADMIN — KETOROLAC TROMETHAMINE 15 MG: 15 INJECTION, SOLUTION INTRAMUSCULAR; INTRAVENOUS at 12:34

## 2024-12-16 RX ADMIN — LIDOCAINE HYDROCHLORIDE 80 MG: 20 INJECTION, SOLUTION EPIDURAL; INFILTRATION; INTRACAUDAL; PERINEURAL at 07:03

## 2024-12-16 RX ADMIN — SODIUM CHLORIDE: 9 INJECTION, SOLUTION INTRAVENOUS at 06:10

## 2024-12-16 RX ADMIN — MIDAZOLAM 1 MG: 1 INJECTION INTRAMUSCULAR; INTRAVENOUS at 06:57

## 2024-12-16 RX ADMIN — KETOROLAC TROMETHAMINE 30 MG: 30 INJECTION, SOLUTION INTRAMUSCULAR at 08:12

## 2024-12-16 RX ADMIN — ACETAMINOPHEN 1000 MG: 500 TABLET ORAL at 06:11

## 2024-12-16 RX ADMIN — CELECOXIB 200 MG: 100 CAPSULE ORAL at 06:11

## 2024-12-16 RX ADMIN — WATER 3000 MG: 1 INJECTION INTRAMUSCULAR; INTRAVENOUS; SUBCUTANEOUS at 07:09

## 2024-12-16 RX ADMIN — FENTANYL CITRATE 50 MCG: 50 INJECTION INTRAMUSCULAR; INTRAVENOUS at 06:45

## 2024-12-16 RX ADMIN — ONDANSETRON 4 MG: 2 INJECTION, SOLUTION INTRAMUSCULAR; INTRAVENOUS at 07:03

## 2024-12-16 RX ADMIN — DEXAMETHASONE SODIUM PHOSPHATE 8 MG: 4 INJECTION, SOLUTION INTRAMUSCULAR; INTRAVENOUS at 07:03

## 2024-12-16 RX ADMIN — HYDROMORPHONE HYDROCHLORIDE 0.5 MG: 1 INJECTION, SOLUTION INTRAMUSCULAR; INTRAVENOUS; SUBCUTANEOUS at 09:27

## 2024-12-16 RX ADMIN — PROPOFOL 200 MG: 10 INJECTION, EMULSION INTRAVENOUS at 07:03

## 2024-12-16 RX ADMIN — MIDAZOLAM HYDROCHLORIDE 1 MG: 1 INJECTION, SOLUTION INTRAMUSCULAR; INTRAVENOUS at 06:45

## 2024-12-16 RX ADMIN — ROCURONIUM BROMIDE 40 MG: 10 INJECTION, SOLUTION INTRAVENOUS at 07:16

## 2024-12-16 RX ADMIN — Medication 160 MG: at 07:03

## 2024-12-16 RX ADMIN — SUGAMMADEX 150 MG: 100 INJECTION, SOLUTION INTRAVENOUS at 08:21

## 2024-12-16 RX ADMIN — FENTANYL CITRATE 50 MCG: 50 INJECTION, SOLUTION INTRAMUSCULAR; INTRAVENOUS at 07:24

## 2024-12-16 RX ADMIN — TRANEXAMIC ACID 1000 MG: 10 INJECTION, SOLUTION INTRAVENOUS at 07:11

## 2024-12-16 ASSESSMENT — PAIN DESCRIPTION - LOCATION
LOCATION: KNEE

## 2024-12-16 ASSESSMENT — PAIN DESCRIPTION - DESCRIPTORS
DESCRIPTORS: DISCOMFORT

## 2024-12-16 ASSESSMENT — PAIN SCALES - GENERAL
PAINLEVEL_OUTOF10: 5
PAINLEVEL_OUTOF10: 2
PAINLEVEL_OUTOF10: 7
PAINLEVEL_OUTOF10: 7
PAINLEVEL_OUTOF10: 5
PAINLEVEL_OUTOF10: 5
PAINLEVEL_OUTOF10: 2
PAINLEVEL_OUTOF10: 5
PAINLEVEL_OUTOF10: 9

## 2024-12-16 ASSESSMENT — PAIN DESCRIPTION - PAIN TYPE
TYPE: SURGICAL PAIN

## 2024-12-16 ASSESSMENT — PAIN DESCRIPTION - ORIENTATION
ORIENTATION: RIGHT

## 2024-12-16 ASSESSMENT — PAIN - FUNCTIONAL ASSESSMENT
PAIN_FUNCTIONAL_ASSESSMENT: PREVENTS OR INTERFERES SOME ACTIVE ACTIVITIES AND ADLS
PAIN_FUNCTIONAL_ASSESSMENT: PREVENTS OR INTERFERES SOME ACTIVE ACTIVITIES AND ADLS
PAIN_FUNCTIONAL_ASSESSMENT: 0-10

## 2024-12-16 ASSESSMENT — PAIN DESCRIPTION - ONSET
ONSET: ON-GOING
ONSET: PROGRESSIVE

## 2024-12-16 NOTE — H&P
formation. He has varus alignment.     IMPRESSION/RECOMMENDATIONS:    54 y.o. year old male with end-stage right knee osteoarthritis  -Here today for elective right total knee arthroplasty  - Risks benefits and alternatives of total knee arthroplasty were discussed in detail.  Patient understands the risk include but are not limited to blood loss, blood clot, infection, damage to surrounding neurovascular structures, tendons, ligaments, instability, arthrofibrosis, limb length inequality, periprosthetic fracture, need for reoperation amongst others. He wishes to proceed with surgery.  -Consent  -site marked  -Discharge home today, same-day discharge            Klaus Abdul DO   Orthopaedic Surgery   12/16/2024  6:24 AM    Note: This report was completed using computerKrauttools voiced recognition software.  Every effort has been made to ensure accuracy; however, inadvertent computerized transcription errors may be present.

## 2024-12-16 NOTE — OP NOTE
Operative Note      Patient: José Antonio Lara  YOB: 1970  MRN: [id number]    Date of Procedure: 12/16/2024    Pre-Op Diagnosis Codes:      * Osteoarthritis of right knee [M17.11]    Post-Op Diagnosis: Same       Procedure(s):  ROBOTIC NATHAN ASSISTED RIGHT KNEE TOTAL JOINT ARTHROPLASTY    +++SEGUN+++   ++PNB++    (SAME DAY D/C)    Surgeon(s):  Klaus Abdul DO    Assistant:   Resident: Adrián Villalobos DO    Anesthesia: Monitor Anesthesia Care    Estimated Blood Loss (mL): less than 50     Complications: None    Specimens:   ID Type Source Tests Collected by Time Destination   A : RIGHT KNEE BONE Bone Joint, Knee SURGICAL PATHOLOGY Klaus Abdul DO 12/16/2024 0729        Implants:  Implant Name Type Inv. Item Serial No.  Lot No. LRB No. Used Action   COMPONENT PAT [id number] SUPERIOR/INFERIOR KNEE - [id number]  COMPONENT PAT [id number] SUPERIOR/INFERIOR KNEE  [employer] Silvercar-[employer] Fresh [id number] Right 1 Implanted   INSERT TIB CNDYL STBL 7 9 MM KNEE 5/PK X3 TRIATHLON - [id number]  INSERT TIB CNDYL STBL 7 9 MM KNEE 5/PK X3 TRIATHLON  [employer] Silvercar-WD [id number] Right 1 Implanted   COMPONENT FEM SZ 7 R KNEE CRUCE RET CEMENTLESS BEAD W/ CINDY - [id number]  COMPONENT FEM SZ 7 R KNEE CRUCE RET CEMENTLESS BEAD W/ CINDY  [employer] Silvercar-WD [id number] Right 1 Implanted   BASEPLATE TIB SZ 7 AP56MM ML80MM KNEE TRITANIUM 4 CRUCFRM - [id number]  BASEPLATE TIB SZ 7 AP56MM ML80MM KNEE TRITANIUM 4 CRUCFRM  [employer] Silvercar-WD [id number] Right 1 Implanted         Drains: * No LDAs found *    Findings:  Infection Present At Time Of Surgery (PATOS) (choose all levels that have infection present):  No infection present  Other Findings: See operative report below    Detailed Description of Procedure:     OPERATIVE REPORT    PATIENT:  José Antonio Lara  [id number]    DATE OF PROCEDURE:  12/16/24    SURGEON: Klaus Abdul DO        OPERATIVE INDICATIONS:  The patient is a 54 year old

## 2024-12-16 NOTE — PROGRESS NOTES
Physical Therapy  Facility/Department: [facility] OR  Physical Therapy Initial Assessment    Name: José Antonio Lara  : 1970  MRN: [id number]  Date of Service: 2024             Patient Diagnosis(es): The primary encounter diagnosis was Pre-op evaluation. Diagnoses of S/P total knee arthroplasty, right and Osteoarthritis of right knee were also pertinent to this visit.  Past Medical History:  has a past medical history of Ankle pain, Arthritis, Chronic back pain, Chronic knee pain, Colon polyps, Diverticulitis, Dizzy spells, Essential hypertension, Fatty liver, Foot pain, Gout, Hx of blood clots, Hyperlipidemia, Hypertension, Iron deficiency, Obesity, Routine chest x-ray, Unspecified sleep apnea, Vitamin B12 deficiency, and Vitamin D deficiency.  Past Surgical History:  has a past surgical history that includes Knee arthroscopy (); Umbilical hernia repair (); Leg Surgery (); Hanh-en-Y Gastric Bypass (2010); Colonoscopy (2017); Colonoscopy (N/A, 2019); and Colonoscopy (N/A, 2022).         Requires PT Follow-Up: Yes    Evaluating Therapist: Funmilayo Victoria, PT     Referring Provider:      Klaus Abdul DO       PT order : PT eval and treat     Room #: PACU 22  DIAGNOSIS: The primary encounter diagnosis was Pre-op evaluation. Diagnoses of S/P total knee arthroplasty, right and Osteoarthritis of right knee were also pertinent to this visit.    PRECAUTIONS: falls, FWABT     Social:  Pt lives with  wife  in a  2  floor plan  ; plans to stay with f-I-l in a 1 story home with 3 steps and 1 grab bar to enter.  Prior to admission pt walked with no AD. Ww issued in PACU, adjusted to proper height      Initial Evaluation  Date:  2024  Treatment      Short Term/ Long Term   Goals   Was pt agreeable to Eval/treatment?  Yes      Does pt have pain? B knees      Bed Mobility  Rolling:  NT   Supine to sit:  NT  Sit to supine:  NT   Scooting:  independent scooting to EOB

## 2024-12-16 NOTE — BRIEF OP NOTE
Brief Postoperative Note      Patient: José Antonio Lara  YOB: 1970  MRN: [id number]    Date of Procedure: 12/16/2024    Pre-Op Diagnosis Codes:      * Osteoarthritis of right knee [M17.11]    Post-Op Diagnosis: Same       Procedure(s):  ROBOTIC NATHAN ASSISTED RIGHT KNEE TOTAL JOINT ARTHROPLASTY    +++SEGUN+++   ++PNB++    (SAME DAY D/C)    Surgeon(s):  Klaus Abdul DO    Assistant:  Resident: Adrián Villalobos DO    Anesthesia: Monitor Anesthesia Care    Estimated Blood Loss (mL): less than 50     Complications: None    Specimens:   ID Type Source Tests Collected by Time Destination   A : [facility] KNEE BONE Bone Joint, Knee SURGICAL PATHOLOGY Klaus Abdul DO 12/16/2024 0729        Implants:  Implant Name Type Inv. Item Serial No.  Lot No. LRB No. Used Action   COMPONENT PAT [id number] SUPERIOR/INFERIOR KNEE - [id number]  COMPONENT PAT [id number] SUPERIOR/INFERIOR KNEE  [employer] [id number] Right 1 Implanted   INSERT TIB CNDYL STBL 7 9 MM KNEE 5/PK X3 TRIATHLON - [id number]  INSERT TIB CNDYL STBL 7 9 MM KNEE 5/PK X3 TRIATHLON  [employer] Bases-WD [id number] Right 1 Implanted   COMPONENT FEM SZ 7 R KNEE CRUCE RET CEMENTLESS BEAD W/ CINDY - [id number]  COMPONENT FEM SZ 7 R KNEE CRUCE RET CEMENTLESS BEAD W/ CINDY  [employer] Bases-WD [id number] Right 1 Implanted   BASEPLATE TIB SZ 7 AP56MM ML80MM KNEE TRITANIUM 4 CRUCFRM - [id number]  BASEPLATE TIB SZ 7 AP56MM ML80MM KNEE TRITANIUM 4 CRUCFRM  [employer]ases-interspireSubmit [id number] Right 1 Implanted         Drains: * No LDAs found *    Findings:  Infection Present At Time Of Surgery (PATOS) (choose all levels that have infection present):  No infection present  Other Findings: Robotic assisted right total knee arthroplasty    Electronically signed by Klaus Abdul DO on 12/16/2024 at 8:21 AM

## 2024-12-16 NOTE — CARE COORDINATION
Pt in OR for right TKA. Spoke with spouse, Funmilayo about diagnosis and discharge plan of care. Pt lives with spouse in 2 story home, going to stay at his father-in-law ranch home. Ordered wheeled walker through [facility]. Plan is [facility] with [facility]-orders completed. [place]

## 2024-12-16 NOTE — ANESTHESIA PROCEDURE NOTES
Peripheral Block    Patient location during procedure: procedure area  Reason for block: post-op pain management and at surgeon's request  Start time: 12/16/2024 6:45 AM  End time: 12/16/2024 6:50 AM  Staffing  Performed: anesthesiologist   Anesthesiologist: Blanca Calero MD  Performed by: Blanca Calero MD  Authorized by: Nicolle Lyn DO    Preanesthetic Checklist  Completed: patient identified, IV checked, site marked, risks and benefits discussed, surgical/procedural consents, equipment checked, pre-op evaluation, timeout performed, anesthesia consent given, oxygen available, monitors applied/VS acknowledged, fire risk safety assessment completed and verbalized and blood product R/B/A discussed and consented  Peripheral Block   Patient position: supine  Prep: ChloraPrep  Provider prep: sterile gloves and mask  Patient monitoring: cardiac monitor, continuous pulse ox, continuous capnometry, frequent blood pressure checks, IV access, oxygen and responsive to questions  Block type: Femoral  Adductor canal  Laterality: right  Injection technique: single-shot  Guidance: ultrasound guided    Needle   Needle type: insulated echogenic nerve stimulator needle   Needle gauge: 21 G  Needle localization: ultrasound guidance  Needle length: 4 inch.  Assessment   Injection assessment: negative aspiration for heme, no paresthesia on injection, local visualized surrounding nerve on ultrasound and no intravascular symptoms  Paresthesia pain: none  Slow fractionated injection: yes  Hemodynamics: stable  Outcomes: uncomplicated and patient tolerated procedure well    Medications Administered  ropivacaine (NAROPIN) injection 0.5% - Perineural   30 mL - 12/16/2024 6:45:00 AM

## 2024-12-17 LAB
EKG ATRIAL RATE: 44 BPM
EKG P AXIS: 28 DEGREES
EKG P-R INTERVAL: 212 MS
EKG Q-T INTERVAL: 456 MS
EKG QRS DURATION: 108 MS
EKG QTC CALCULATION (BAZETT): 389 MS
EKG R AXIS: 2 DEGREES
EKG T AXIS: 25 DEGREES
EKG VENTRICULAR RATE: 44 BPM

## 2024-12-17 NOTE — ANESTHESIA POSTPROCEDURE EVALUATION
Department of Anesthesiology  Postprocedure Note    Patient: José Antonio Lara  MRN: [id number]  YOB: 1970  Date of evaluation: 12/16/2024    Procedure Summary       Date: 12/16/24 Room / Location: [address]    Anesthesia Start: 0654 Anesthesia Stop: 0849    Procedure: ROBOTIC NATHAN ASSISTED RIGHT KNEE TOTAL JOINT ARTHROPLASTY (Right: Knee) Diagnosis:       Osteoarthritis of right knee      (Osteoarthritis of right knee [M17.11])    Surgeons: Klaus Abdul DO Responsible Provider: Nicolle Lyn DO    Anesthesia Type: General, Regional ASA Status: 3            Anesthesia Type: General, Regional    Odin Phase I: Odin Score: 9    Odin Phase II: Odin Score: 10    Anesthesia Post Evaluation    Patient location during evaluation: PACU  Patient participation: complete - patient participated  Level of consciousness: awake  Airway patency: patent  Nausea & Vomiting: no nausea and no vomiting  Cardiovascular status: hemodynamically stable  Respiratory status: acceptable  Hydration status: stable  Pain management: adequate    No notable events documented.

## 2024-12-17 NOTE — DISCHARGE SUMMARY
Physician Discharge Summary     Patient ID:  José Antonio Lara  [id number]  54 y.o.  1970    Admit date: 12/16/2024    Discharge date and time: 12/16/2024  2:05 PM     Admitting Physician: lKaus Abdul DO     Discharge Physician: Klaus Abdul DO    Admission Diagnoses: Osteoarthritis of right knee [M17.11]  S/P total knee arthroplasty, right [Z96.651]    Discharge Diagnoses: s/p right total Knee arthroplasty    Admission Condition: good    Discharged Condition: good    Hospital Course: Patient underwent an outpatient right total knee arthroplasty and was discharged from the recovery room same-day discharge.  He was discharged on aspirin for DVT prophylaxis and appropriate pain control.  He will follow-up in my office in 2 weeks    Signed:  Klaus Abdul DO  12/17/2024

## 2024-12-19 LAB — SURGICAL PATHOLOGY REPORT: NORMAL

## 2024-12-23 ENCOUNTER — TELEPHONE (OUTPATIENT)
Dept: FAMILY MEDICINE CLINIC | Age: 54
End: 2024-12-23

## 2024-12-23 NOTE — TELEPHONE ENCOUNTER
Have patient monitor BID.   Send results over the next 5 days please, if continued above 180. Please call me.

## 2024-12-24 ENCOUNTER — TELEPHONE (OUTPATIENT)
Dept: ORTHOPEDIC SURGERY | Age: 54
End: 2024-12-24

## 2024-12-24 DIAGNOSIS — Z96.651 S/P TOTAL KNEE ARTHROPLASTY, RIGHT: Primary | ICD-10-CM

## 2024-12-24 NOTE — TELEPHONE ENCOUNTER
Patient called in for a refill on his oxycodone, please call into [facility] pharmacy. Thank You:)

## 2024-12-26 ENCOUNTER — OFFICE VISIT (OUTPATIENT)
Dept: ORTHOPEDIC SURGERY | Age: 54
End: 2024-12-26

## 2024-12-26 VITALS
OXYGEN SATURATION: 97 % | RESPIRATION RATE: 20 BRPM | DIASTOLIC BLOOD PRESSURE: 73 MMHG | TEMPERATURE: 97.5 F | SYSTOLIC BLOOD PRESSURE: 137 MMHG | HEART RATE: 84 BPM

## 2024-12-26 DIAGNOSIS — Z96.651 S/P TOTAL KNEE ARTHROPLASTY, RIGHT: Primary | ICD-10-CM

## 2024-12-26 PROCEDURE — 99024 POSTOP FOLLOW-UP VISIT: CPT | Performed by: STUDENT IN AN ORGANIZED HEALTH CARE EDUCATION/TRAINING PROGRAM

## 2024-12-26 RX ORDER — OXYCODONE AND ACETAMINOPHEN 5; 325 MG/1; MG/1
1 TABLET ORAL EVERY 6 HOURS PRN
Qty: 28 TABLET | Refills: 0 | Status: SHIPPED | OUTPATIENT
Start: 2024-12-26 | End: 2025-01-02

## 2024-12-26 NOTE — PROGRESS NOTES
Follow Up Post Operative Visit     Surgery: Robotic assisted right total knee arthroplasty  Date: 2024    Subjective:    José Antonio Lara is here for follow up visit s/p above procedure.  He has been doing very well.  His pain is well-controlled.  He has achieved about 85 degrees of flexion with home therapy.  He states that his pain is getting better every day.  Ambulating with walker.  Denies any incisional problems.    Controlled Substances Monitoring:        Physical Exam:    No data recorded    General: Alert and oriented x3, no acute distress  Cardiovascular/pulmonary: No labored breathing, peripheral perfusion intact  Musculoskeletal:    Right knee: Incision well-approximated staples removed Steri-Strips placed.  Range of motion actively from about 3 to 85 degrees.  Calf soft compressible.  Normal postoperative swelling.  No signs of drainage or infection      Imagin views of the right knee demonstrate a well-placed total knee arthroplasty without signs of complication.  Images independently interpreted by myself    Assessment and Plan: 2 weeks status post right total knee arthroplasty    -Local wound and incisional care discussed.  Finish aspirin for 2 additional weeks for full duration of prescription.  Outpatient therapy will start this coming week.  Range of motion goals discussed as well.  We will see how he recovers over the next 4 weeks.  If he has achieved his motion goals and is happy with his results we can discuss scheduling his other side.            Klaus Abdul DO   Orthopaedic Surgery   24  10:33 AM    Note: This report was completed using Wikid[employer] voiced recognition software.  Every effort has been made to ensure accuracy; however, inadvertent computerized transcription errors may be present.

## 2025-01-14 ENCOUNTER — EVALUATION (OUTPATIENT)
Dept: PHYSICAL THERAPY | Age: 55
End: 2025-01-14
Payer: COMMERCIAL

## 2025-01-14 DIAGNOSIS — Z96.651 S/P TOTAL KNEE ARTHROPLASTY, RIGHT: Primary | ICD-10-CM

## 2025-01-14 PROCEDURE — 97161 PT EVAL LOW COMPLEX 20 MIN: CPT | Performed by: PHYSICAL THERAPIST

## 2025-01-14 PROCEDURE — 97110 THERAPEUTIC EXERCISES: CPT | Performed by: PHYSICAL THERAPIST

## 2025-01-14 NOTE — PROGRESS NOTES
[facility]   Phone: [phone]   Fax: [phone]         Date:  2025   Patient: José Antonio Lara  : 1970  MRN: [id number]  Referring Provider: Klaus Abdul DO  [address]  [address]  [place] [address]     Medical Diagnosis:      Diagnosis Orders   1. S/P total knee arthroplasty, right             SUBJECTIVE:     Onset date: 24    Onset: Insidious onset    Mechanism of Injury: Pt is s/p right TKA due OA.    Previous PT:  home PT      Medical Management for Current Problem:  surgery as above    Chief complaint: pain, inability / limited ability to use leg, difficulty walking, poor sleep quality     Behavior: condition is getting better    Pain: constant  Current: 2/10     Best: 2/10     Worst:4/10    Symptom Type/Quality: N/A  Location:: Knee: medial, anterior, suprapatellar     Imaging results: XR KNEE RIGHT (1-2 VIEWS)    Result Date: 2024  EXAMINATION: TWO XRAY VIEWS OF THE RIGHT KNEE 2024 10:41 am COMPARISON: 2024 HISTORY: ORDERING SYSTEM PROVIDED HISTORY: S/P total knee arthroplasty, right FINDINGS: Two view right knee reveals hardware from total right knee arthroplasty. Satisfactory alignment of the prosthesis.  Postsurgical changes seen within the soft tissues with surgical skin staples present.  No hardware complication.     Satisfactory postoperative appearance of the right knee.     XR KNEE RIGHT (1-2 VIEWS)    Result Date: 2024  EXAMINATION: TWO XRAY VIEWS OF THE RIGHT KNEE 2024 9:54 am COMPARISON: None. HISTORY: ORDERING SYSTEM PROVIDED HISTORY: post op TECHNOLOGIST PROVIDED HISTORY: Of operative side while in recovery room. Reason for exam:->post op FINDINGS: AP and lateral views of the right knee reveal status post right total knee arthroplasty and excellent anatomic alignment expected postsurgical changes the adjacent soft tissues.     Status post right total knee arthroplasty with excellent anatomic

## 2025-01-14 NOTE — PROGRESS NOTES
[facility]          Phone: [phone] Fax: [phone]    Physical Therapy Daily Treatment Note  Date:  2025    Patient Name:  José Antonio Lara    :  1970  MRN: [id number]    Evaluating Therapist:  Sarah Shaver, PT [id number]    Restrictions/Precautions:    Diagnosis:     Diagnosis Orders   1. S/P total knee arthroplasty, right          Treatment Diagnosis:    Insurance/Certification information:  [facility]  Referring Physician:  Klaus Abdul DO  Plan of care signed (Y/N):    Visit# / total visits:    Pain level: 2/10   Time In:  0714  Time Out:  075    Subjective:  See evaluation     Exercises:  Exercise/Equipment Resistance/Repetitions Other comments     bike 10 minutes Seat @ 11     Quad sets Heel propped on towel, 5 sec x 10 reps      Heel slides with PFB 5 sec x 10 reps      SAQ       SLR       Seated knee flex       Sit to stand       Standing hip 3-way       Step-ups                                                                                Other Therapeutic Activities:  PT evaluation completed    Home Exercise Program:  per home health PT    Manual Treatments:  N/A    Modalities:  N/A     Time-in Time-out Total Time   99555  Evaluation Low Complexity 0714 0735 21   60398  Evaluation Med Complexity      51222  Evaluation High Complexity      68760  Ther Ex 0735 0756 21   48263  Neuro Re-ed        58467  Ther Activities        14964  Manual Therapy       14237  E-stim       37235  Ultrasound            Session 0714 0756 42       Treatment/Activity Tolerance:  [x] Patient tolerated treatment well [] Patient limited by fatigue  [] Patient limited by pain  [] Patient limited by other medical complications  [] Other:     Prognosis: [x] Good [] Fair  [] Poor    Patient Requires Follow-up: [x] Yes  [] No    Plan:   [] Continue per plan of care [] Alter current plan (see comments)  [x] Plan of care initiated [] Hold pending MD visit []

## 2025-01-16 ENCOUNTER — TREATMENT (OUTPATIENT)
Dept: PHYSICAL THERAPY | Age: 55
End: 2025-01-16
Payer: COMMERCIAL

## 2025-01-16 DIAGNOSIS — Z96.651 S/P TOTAL KNEE ARTHROPLASTY, RIGHT: Primary | ICD-10-CM

## 2025-01-16 PROCEDURE — 97110 THERAPEUTIC EXERCISES: CPT

## 2025-01-20 ENCOUNTER — TREATMENT (OUTPATIENT)
Dept: PHYSICAL THERAPY | Age: 55
End: 2025-01-20
Payer: COMMERCIAL

## 2025-01-20 DIAGNOSIS — Z96.651 S/P TOTAL KNEE ARTHROPLASTY, RIGHT: Primary | ICD-10-CM

## 2025-01-20 DIAGNOSIS — Z96.651 S/P TOTAL KNEE ARTHROPLASTY, RIGHT: ICD-10-CM

## 2025-01-20 PROCEDURE — 97110 THERAPEUTIC EXERCISES: CPT

## 2025-01-20 RX ORDER — OXYCODONE AND ACETAMINOPHEN 5; 325 MG/1; MG/1
1 TABLET ORAL EVERY 6 HOURS PRN
Qty: 28 TABLET | Refills: 0 | Status: SHIPPED | OUTPATIENT
Start: 2025-01-20 | End: 2025-01-27

## 2025-01-20 RX ORDER — IBUPROFEN 600 MG/1
600 TABLET, FILM COATED ORAL 3 TIMES DAILY PRN
Qty: 30 TABLET | Refills: 0 | Status: SHIPPED | OUTPATIENT
Start: 2025-01-20

## 2025-01-20 NOTE — TELEPHONE ENCOUNTER
Right knee NATHAN 12/16/24    Patient requesting refill on pain medication and his PT wanted him to ask for something OTC for swelling?

## 2025-01-20 NOTE — PROGRESS NOTES
[facility]          Phone: [phone] Fax: [phone]    Physical Therapy Daily Treatment Note  Date:  2025    Patient Name:  José Antonio Lara    :  1970  MRN: [id number]    Evaluating Therapist:  Sarah Shaver, VASQUEZ [id number]    Restrictions/Precautions:    Diagnosis:     Diagnosis Orders   1. S/P total knee arthroplasty, right          Treatment Diagnosis:    Insurance/Certification information:  [facility]  Referring Physician:  Klaus Abdul DO  Plan of care signed (Y/N):    Visit# / total visits:  3/12  Pain level: 4-5/10   Time In:  915  Time Out:  950    Subjective:  Pt reported that on Friday after last session on 25, pt could not walk d/t L achilles pain. And thought it could be related to his therapy appointment as he had no other explanation for this pain occurring.  Therapist discussed with pt that his therapy consisted of all mat TE except for sit <> stand  and unlikely therapy caused L achilles pain.  Pt was instructed to follow up w/ Doctor regarding L achilles pain.  Pt also reported he ran out of pain meds, and did not take any OTC pain meds before therapy today.  Therapist instructed pt to contact his Orthopedic surgeon to see if he could receive additional pain meds, but also instructed pt to continue to elevate and ice R knee in addition to taking OTC meds if not contra indicated for any reason to improve tolerance for therapy . Pt reported understanding       Exercises:  Exercise/Equipment Resistance/Repetitions Other comments     bike 10 minutes Seat @ 11     Quad sets Heel propped on towel, 5 sec x 10 reps      Heel slides with PFB 5 sec x 10 reps      SAQ 3 sec  x 7 reps AAROM, 3 reps AROM      SLR 2 X 5 reps       Seated knee flex RTB x 10 reps       Sit to stand Held today only     Standing hip 3-way       Step-ups                                                                                Other Therapeutic Activities: pt

## 2025-01-21 NOTE — TELEPHONE ENCOUNTER
----- Message from Kelin Welch sent at 7/28/2023 10:46 AM EDT -----  Subject: Medication Problem    Medication: cephALEXin (KEFLEX) 500 MG capsule  Dosage: One capsule three times a day  Ordering Provider: Gene Delcid    Question/Problem: The medicine did not make any improve on the Cyst. No   Change.       Pharmacy: [address] [place] [address] [id number] Pio Pfeiffer, [address] [place] [address]   [phone] [facility] [phone]    ---------------------------------------------------------------------------  --------------  Alie SUTHERLAND  [phone]; OK to leave message on voicemail  ---------------------------------------------------------------------------  --------------    SCRIPT ANSWERS  Relationship to Patient: Self No

## 2025-01-22 ENCOUNTER — TREATMENT (OUTPATIENT)
Dept: PHYSICAL THERAPY | Age: 55
End: 2025-01-22
Payer: COMMERCIAL

## 2025-01-22 DIAGNOSIS — Z96.651 S/P TOTAL KNEE ARTHROPLASTY, RIGHT: Primary | ICD-10-CM

## 2025-01-22 PROCEDURE — 97110 THERAPEUTIC EXERCISES: CPT

## 2025-01-22 NOTE — PROGRESS NOTES
[facility]          Phone: [phone] Fax: [phone]    Physical Therapy Daily Treatment Note  Date:  2025    Patient Name:  José Antonio Lara    :  1970  MRN: [id number]    Evaluating Therapist:  Sarah Shaver, VASQUEZ [id number]    Restrictions/Precautions:    Diagnosis:     Diagnosis Orders   1. S/P total knee arthroplasty, right          Treatment Diagnosis:    Insurance/Certification information:  [facility]  Referring Physician:  Klaus Abdul DO  Plan of care signed (Y/N):    Visit# / total visits:    Pain level: 3/10   Time In:  915  Time Out:  949    Subjective:  Pt reported after last session he had no L achilles pain.  He contacted his physician and received more pain medication that he took before therapy.  Pt reported he wanted WB TE held today as he is traveling to [facility] [employer] his Son[employer] immediately following therapy, and is concerned his achilles on L LE will flare again.  [place]  Upon therapist examining his incision, it was noted he still has 2 scabbed areas on distal incision and was advised to NOT get in the water as these areas could open with prolonged water exposure, and possibly lead to infection. . Pt reported understanding. The trip to the [facility] is close to 2 hrs, pt was advised to stop half way there and get out of the car and move about, pt was also advised to perform ankle pumps while seated during the ride. Pt reported understanding.     Exercises:  Exercise/Equipment Resistance/Repetitions Other comments     bike 10 minutes Seat @ 11     Quad sets Heel propped on towel, 5 sec x 10 reps      Heel slides with PFB 5 sec x 10 reps      SAQ  3 sec  x 10 reps reps AROM      SLR 2 X 5 reps  Cued for QS with initiation     Seated knee flex RTB x 10 reps       Sit to stand Held today per pt      Standing hip 3-way       Step-ups

## 2025-01-27 ENCOUNTER — TREATMENT (OUTPATIENT)
Dept: PHYSICAL THERAPY | Age: 55
End: 2025-01-27
Payer: COMMERCIAL

## 2025-01-27 DIAGNOSIS — Z96.651 S/P TOTAL KNEE ARTHROPLASTY, RIGHT: Primary | ICD-10-CM

## 2025-01-27 PROCEDURE — 97110 THERAPEUTIC EXERCISES: CPT

## 2025-01-27 NOTE — PROGRESS NOTES
[facility]          Phone: [phone] Fax: [phone]    Physical Therapy Daily Treatment Note  Date:  2025    Patient Name:  José Antonio Lara    :  1970  MRN: [id number]    Evaluating Therapist:  Sarah Shaver, PT [id number]    Restrictions/Precautions:    Diagnosis:     Diagnosis Orders   1. S/P total knee arthroplasty, right          Treatment Diagnosis:    Insurance/Certification information:  [facility]  Referring Physician:  Klaus Abdul DO  Plan of care signed (Y/N):    Visit# / total visits:    Pain level: 0/10   Time In:  929  Time Out:  958    Subjective:  Pt had no issues with L achilles pain, trip to [facility] went okay per pt, he did a lot of walking.     Exercises:  Exercise/Equipment Resistance/Repetitions Other comments     bike 10 minutes Seat @ 11 advance seat next session     Quad sets Heel propped on towel, 5 sec x 10 reps      Heel slides with PFB 5 sec x 10 reps      SAQ  3 sec  x 10 reps reps AROM Cued for QS     SLR 2 X 5 reps  Cued for QS with initiation     Seated knee flex RTB x 10 reps       Sit to stand X 10 reps       Standing hip 3-way X 10 reps each B       Step-ups 6\" x 10 reps leading R LE                                                                               Other Therapeutic Activities: pt performed all TE slowly . Tolerated reintroduction of WB TE without c/o.  Pt motivated to get better and back to normal.  Pt was instructed to elevate and rest R LE and apply heat or ice to R knee Pt reported understanding    Home Exercise Program:  per home health PT. 25  Seated knee flex stretch    Manual Treatments:  N/A    Modalities:  N/A     Time-in Time-out Total Time   24622  Evaluation Low Complexity      55835  Evaluation Med Complexity      61308  Evaluation High Complexity      85895  Ther Ex 929 958 29   73790  Neuro Re-ed        94483  Ther Activities        04765  Manual Therapy       66381  E-stim

## 2025-01-29 ENCOUNTER — TREATMENT (OUTPATIENT)
Dept: PHYSICAL THERAPY | Age: 55
End: 2025-01-29
Payer: COMMERCIAL

## 2025-01-29 DIAGNOSIS — Z96.651 S/P TOTAL KNEE ARTHROPLASTY, RIGHT: Primary | ICD-10-CM

## 2025-01-29 PROCEDURE — 97110 THERAPEUTIC EXERCISES: CPT

## 2025-01-29 NOTE — PROGRESS NOTES
[facility]          Phone: [phone] Fax: [phone]    Physical Therapy Daily Treatment Note  Date:  2025    Patient Name:  José Antonio Lara    :  1970  MRN: [id number]    Evaluating Therapist:  Sarah Shaver, PT [id number]    Restrictions/Precautions:    Diagnosis:     Diagnosis Orders   1. S/P total knee arthroplasty, right          Treatment Diagnosis:    Insurance/Certification information:  [facility]  Referring Physician:  Klaus Abdul DO  Plan of care signed (Y/N):    Visit# / total visits:    Pain level: 0/10   Time In:  920  Time Out:  1000    Subjective:  Pt had no new reports    Exercises:  Exercise/Equipment Resistance/Repetitions Other comments     bike 10 minutes Seat @ 10      Quad sets Heel propped on towel, 5 sec x 10 reps      Heel slides with PFB 5 sec x 10 reps      SAQ  3 sec 2 x 10 reps reps AROM Cued for QS     SLR X 10 reps  Cued for QS with initiation     Seated knee flex RTB x 10 reps       Sit to stand X 10 reps       Standing hip 3-way      Step-ups 6\" x 10 reps leading R LE       SL abd  3 sec x 10 reps        VMO SLR  2 x 5 reps       TKE              Gait with SPC  2 laps clinic supervision   Cued for heel strike toe off                                          Other Therapeutic Activities: pt performed all TE slowly .  Pt motivated to get better and back to normal.  Pt was instructed to elevate and rest R LE and apply heat or ice to R knee Pt reported understanding    Home Exercise Program:  per home health PT. 25  Seated knee flex stretch    Manual Treatments:  N/A    Modalities:  N/A     Time-in Time-out Total Time   19648  Evaluation Low Complexity      38240  Evaluation Med Complexity      53171  Evaluation High Complexity      20564  Ther Ex 920 1000 40   69275  Neuro Re-ed        29048  Ther Activities        31079  Manual Therapy       93270  E-stim       23838  Ultrasound            Session 920 1000 40

## 2025-01-30 ENCOUNTER — OFFICE VISIT (OUTPATIENT)
Dept: ORTHOPEDIC SURGERY | Age: 55
End: 2025-01-30

## 2025-01-30 VITALS
SYSTOLIC BLOOD PRESSURE: 143 MMHG | OXYGEN SATURATION: 99 % | DIASTOLIC BLOOD PRESSURE: 85 MMHG | BODY MASS INDEX: 42.61 KG/M2 | TEMPERATURE: 97.5 F | WEIGHT: 315 LBS | HEART RATE: 54 BPM

## 2025-01-30 DIAGNOSIS — Z96.651 S/P TOTAL KNEE ARTHROPLASTY, RIGHT: Primary | ICD-10-CM

## 2025-01-30 PROCEDURE — 99024 POSTOP FOLLOW-UP VISIT: CPT | Performed by: STUDENT IN AN ORGANIZED HEALTH CARE EDUCATION/TRAINING PROGRAM

## 2025-01-30 NOTE — PROGRESS NOTES
Follow Up Post Operative Visit     Surgery: Robotic assisted right total knee arthroplasty  Date: 12/16/2024    Subjective:    José Antonio Lara is here for follow up visit s/p above procedure.  He states he is overall doing well and pain is well-controlled with ibuprofen.  He is ambulating with a walker. He states his uses this occasionally for stability.  He has completed his aspirin for DVT prophylaxis.  He has transition to outpatient therapy and states is overall going well.  He denies any numbness and tingling.  He is happy with his results.    Controlled Substances Monitoring:        Physical Exam:    No data recorded    General: Alert and oriented x3, no acute distress  Cardiovascular/pulmonary: No labored breathing, peripheral perfusion intact  Musculoskeletal:    Right knee: Mature scar formation.  No abnormalities noted including erythema, warmth. Mild edema noted. Active range of motion near full extension - 120 degrees with flexion and extension.  Calf soft and compressible.  Plantarflexion dorsiflexion intact. +2 DP/PT.    Imaging: No new imaging today previous imaging was reviewed.     2 views of the right knee demonstrate a well-placed total knee arthroplasty without signs of complication.  Images independently interpreted by myself    Assessment and Plan: 6 weeks status post right total knee arthroplasty    - He is overall doing well and is happy with his results thus far. Continue physical therapy. Order for cane provided today. We discussed pain control with Tylenol and ibuprofen as needed. He is interested in getting his left knee completed in December. All questions and concerns were answered in detail. He can follow up in 8 weeks for final imaging and injection to the left knee.    WILBERTO Ortiz-CNP  Orthopedic Surgery   01/30/25  10:07 AM      Note: This report was completed using [employer]ing[employer] voiced recognition software.  Every effort has been made to ensure accuracy; however,

## 2025-02-03 ENCOUNTER — TREATMENT (OUTPATIENT)
Dept: PHYSICAL THERAPY | Age: 55
End: 2025-02-03
Payer: COMMERCIAL

## 2025-02-03 DIAGNOSIS — Z96.651 S/P TOTAL KNEE ARTHROPLASTY, RIGHT: Primary | ICD-10-CM

## 2025-02-03 PROCEDURE — 97110 THERAPEUTIC EXERCISES: CPT

## 2025-02-03 NOTE — PROGRESS NOTES
[facility]          Phone: [phone] Fax: [phone]    Physical Therapy Daily Treatment Note  Date:  2/3/2025    Patient Name:  José Antonio Lara    :  1970  MRN: [id number]    Evaluating Therapist:  Sarah Shaver, VASQUEZ [id number]    Restrictions/Precautions:    Diagnosis:     Diagnosis Orders   1. S/P total knee arthroplasty, right          Treatment Diagnosis:    Insurance/Certification information:  [facility]  Referring Physician:  Klaus Abdul DO  Plan of care signed (Y/N):    Visit# / total visits:    Pain level: 0/10   Time In:  928  Time Out:  1006    Subjective:  Pt had no new reports, improved gait sequencing noted with SPC on arrival.    Exercises:  Exercise/Equipment Resistance/Repetitions Other comments     bike 10 minutes Seat @ 10 level 4     Quad sets Heel propped on towel, 5 sec x 10 reps      Heel slides with PFB 5 sec x 10 reps      SAQ  2# 3 sec 2 x 10 reps reps AROM Cued for QS     SLR 3 sec X 15 reps  Cued for QS with initiation     Seated knee flex GTB x 15 reps       Sit to stand X 10 reps       Standing hip 3-way X 10 reps each B       Step-ups 6\" x 10 reps leading R LE       SL abd        VMO SLR  2 x 5 reps       TKE       Knee flex stretch @ step 20 sec x 2 reps       Gait with SPC              2/3/25       R knee 5-110°                     Other Therapeutic Activities: pt performed all TE slowly .  Pt motivated to get better and back to normal.  Pt was instructed to elevate and rest R LE and apply heat or ice to R knee Pt reported understanding    Home Exercise Program:  per home health PT. 25  Seated knee flex stretch    Manual Treatments:  N/A    Modalities:  N/A     Time-in Time-out Total Time   28285  Evaluation Low Complexity      85292  Evaluation Med Complexity      26849  Evaluation High Complexity      34790  Ther Ex 928 1006 38   63806  Neuro Re-ed        12407  Ther Activities        98202  Manual Therapy

## 2025-02-05 ENCOUNTER — TREATMENT (OUTPATIENT)
Dept: PHYSICAL THERAPY | Age: 55
End: 2025-02-05
Payer: COMMERCIAL

## 2025-02-05 DIAGNOSIS — Z96.651 S/P TOTAL KNEE ARTHROPLASTY, RIGHT: Primary | ICD-10-CM

## 2025-02-05 PROCEDURE — 97110 THERAPEUTIC EXERCISES: CPT

## 2025-02-05 NOTE — PROGRESS NOTES
[facility]          Phone: [phone] Fax: [phone]    Physical Therapy Daily Treatment Note  Date:  2025    Patient Name:  José Antonio Lara    :  1970  MRN: [id number]    Evaluating Therapist:  Sarah Shaver, VASQUEZ [id number]    Restrictions/Precautions:    Diagnosis:     Diagnosis Orders   1. S/P total knee arthroplasty, right          Treatment Diagnosis:    Insurance/Certification information:  [facility]  Referring Physician:  Klaus Abdul DO  Plan of care signed (Y/N):    Visit# / total visits:    Pain level: 0/10   Time In:  930  Time Out:  1010    Subjective:  Pt had no new reports.     Exercises:  Exercise/Equipment Resistance/Repetitions Other comments     bike 10 minutes Seat @ 10 level 4     Quad sets Heel propped on towel, 5 sec x 10 reps      Heel slides with PFB 5 sec x 10 reps      SAQ  2# 3 sec 2 x 10 reps reps AROM Cued for QS     SLR 2# 3 sec X 15 reps  Cued for QS with initiation     Seated knee flex GTB x 15 reps       Sit to stand X 10 reps       Standing hip 3-way X 10 reps each B       Step-ups 6\" x 10 reps leading R LE      X 5 reps SW leading R LE        SL abd        VMO SLR  2# 2 x 5 reps       TKE       Knee flex stretch @ step 20 sec x 2 reps       Gait with SPC              25       R knee 5-110°                     Other Therapeutic Activities: pt performed all TE slowly .  Pt motivated to get better and back to normal.  Pt was instructed to elevate and rest R LE and apply heat or ice to R knee Pt reported understanding    Home Exercise Program:  per home health PT. 25  Seated knee flex stretch    Manual Treatments:  N/A    Modalities:  N/A     Time-in Time-out Total Time   23827  Evaluation Low Complexity      44779  Evaluation Med Complexity      51556  Evaluation High Complexity      84090  Ther Ex 930 1010 40   05402  Neuro Re-ed        58189  Ther Activities        70750  Manual Therapy       83299  E-stim

## 2025-02-10 ENCOUNTER — TREATMENT (OUTPATIENT)
Dept: PHYSICAL THERAPY | Age: 55
End: 2025-02-10
Payer: COMMERCIAL

## 2025-02-10 DIAGNOSIS — Z96.651 S/P TOTAL KNEE ARTHROPLASTY, RIGHT: Primary | ICD-10-CM

## 2025-02-10 PROCEDURE — 97110 THERAPEUTIC EXERCISES: CPT

## 2025-02-10 NOTE — PROGRESS NOTES
[facility]          Phone: [phone] Fax: [phone]    Physical Therapy Daily Treatment Note  Date:  2/10/2025    Patient Name:  José Antonio Lara    :  1970  MRN: [id number]    Evaluating Therapist:  Sarah Shaver, PT [id number]    Restrictions/Precautions:    Diagnosis:     Diagnosis Orders   1. S/P total knee arthroplasty, right          Treatment Diagnosis:    Insurance/Certification information:  [facility]  Referring Physician:  Klaus Abdul DO  Plan of care signed (Y/N):    Visit# / total visits:    Pain level: 0/10   Time In:  925  Time Out:  1003    Subjective:  Pt had no new reports.     Exercises:  Exercise/Equipment Resistance/Repetitions Other comments     bike 10 minutes Seat @ 10 level 4     Quad sets Heel propped on towel, 5 sec x 10 reps      Heel slides with PFB 5 sec x 10 reps      SAQ  2.5 # 3 sec 2 x 10 reps reps AROM Cued for QS     SLR 2.5 # 3 sec X 10 reps  Cued for QS with initiation     Seated knee flex GTB x 15 reps       Sit to stand X 10 reps       Standing hip 3-way X 10 reps each B       Step-ups 6\" x 10 reps leading R LE      X 10 reps SW leading R LE        SL abd        VMO SLR  2.5 # 2 x 5 reps       TKE       Knee flex stretch @ step 20 sec x 2 reps       Gait with SPC              2/10/25       R knee 4-110°                     Other Therapeutic Activities: pt performed all TE slowly .  Pt motivated to get better and back to normal.        Home Exercise Program:  per home health PT. 25  Seated knee flex stretch    Manual Treatments:  N/A    Modalities:  N/A     Time-in Time-out Total Time   51214  Evaluation Low Complexity      65361  Evaluation Med Complexity      95840  Evaluation High Complexity      77916  Ther Ex 925 1003 38   76184  Neuro Re-ed        34559  Ther Activities        22502  Manual Therapy       66421  E-stim       02598  Ultrasound            Session 925 1003 38       Treatment/Activity

## 2025-02-14 ENCOUNTER — TREATMENT (OUTPATIENT)
Dept: PHYSICAL THERAPY | Age: 55
End: 2025-02-14

## 2025-02-14 DIAGNOSIS — Z96.651 S/P TOTAL KNEE ARTHROPLASTY, RIGHT: Primary | ICD-10-CM

## 2025-02-14 NOTE — PROGRESS NOTES
[facility]          Phone: [phone] Fax: [phone]    Physical Therapy Daily Treatment Note  Date:  2025    Patient Name:  José Antonio Lara    :  1970  MRN: [id number]    Evaluating Therapist:  Sarah Shaver, PT [id number]    Restrictions/Precautions:    Diagnosis:     Diagnosis Orders   1. S/P total knee arthroplasty, right          Treatment Diagnosis:    Insurance/Certification information:  [facility]  Referring Physician:  Klaus Abdul DO  Plan of care signed (Y/N):    Visit# / total visits:  10/12  Pain level: 0/10   Time In:  0958  Time Out:  1036    Subjective:  Pt reports that he is walking better, able to walk into the clinic without his cane.     Exercises:  Exercise/Equipment Resistance/Repetitions Other comments     bike 10 minutes Seat @ 10 level 4     Quad sets Heel propped on towel, 5 sec x 10 reps      Heel slides with PFB 5 sec x 10 reps      SAQ  2.5 # 3 sec 2 x 10 reps reps AROM Cued for QS     SLR 2.5 # 3 sec X 10 reps  Cued for QS with initiation     Seated knee flex GTB x 15 reps       Sit to stand X 10 reps       Standing hip 3-way X 10 reps each B       Step-ups 6\" x 10 reps leading R LE      X 10 reps SW leading R LE        SL abd        VMO SLR  2.5 # 2 x 5 reps       TKE GTB x 15 reps      Knee flex stretch @ step 20 sec x 2 reps       Gait with SPC              2/10/25       R knee 4-110°                     Other Therapeutic Activities: reciprocal stair negotiation x 4 laps.        Home Exercise Program:  per home health PT. 25  Seated knee flex stretch    Manual Treatments:  N/A    Modalities:  N/A     Time-in Time-out Total Time   01155  Evaluation Low Complexity      57856  Evaluation Med Complexity      80479  Evaluation High Complexity      16661  Ther Ex 0958 1036 38   16783  Neuro Re-ed        96280  Ther Activities        57882  Manual Therapy       32106  E-stim       70822  Ultrasound            Session 0958

## 2025-02-17 ENCOUNTER — TREATMENT (OUTPATIENT)
Dept: PHYSICAL THERAPY | Age: 55
End: 2025-02-17
Payer: COMMERCIAL

## 2025-02-17 DIAGNOSIS — Z96.651 S/P TOTAL KNEE ARTHROPLASTY, RIGHT: Primary | ICD-10-CM

## 2025-02-17 PROCEDURE — 97110 THERAPEUTIC EXERCISES: CPT

## 2025-02-17 NOTE — PROGRESS NOTES
[facility]          Phone: [phone] Fax: [phone]    Physical Therapy Daily Treatment Note  Date:  2025    Patient Name:  José Antonio Lara    :  1970  MRN: [id number]    Evaluating Therapist:  Sarah Shaver, PT [id number]    Restrictions/Precautions:    Diagnosis:     Diagnosis Orders   1. S/P total knee arthroplasty, right          Treatment Diagnosis:    Insurance/Certification information:  [facility]  Referring Physician:  Klaus Abdul DO  Plan of care signed (Y/N):    Visit# / total visits:    Pain level: 0/10   Time In:  0925  Time Out:  958    Subjective:  Pt stated that he is happy with the process, and progress he has made in therapy.  Pt stated he feels confident continuing on his own after his last session, his employer provides a gym membership and he is going to pursue that once skilled intervention is completed.       Exercises:  Exercise/Equipment Resistance/Repetitions Other comments     bike 10 minutes Seat @ 10 level 4     Quad sets Heel propped on towel, 5 sec x 10 reps      Heel slides with PFB 5 sec x 15 reps      SAQ  2.5 # 3 sec 2 x 10 reps reps AROM Cued for QS     SLR 2.5 # 3 sec X 10 reps  Cued for QS with initiation     Seated knee flex GTB x 15 reps       Sit to stand X 10 reps       Standing hip 3-way X 10 reps each B       Step-ups 6\" x 10 reps leading R LE      X 10 reps SW leading R LE        SL abd        VMO SLR  2.5 # 2 x 5 reps       TKE GTB x 15 reps      Knee flex stretch @ step 20 sec x 2 reps       Gait with SPC              25       R knee 5-115°                     Other Therapeutic Activities: reciprocal stair negotiation x 4 laps.        Home Exercise Program:  per home health PT. 25  Seated knee flex stretch    Manual Treatments:  N/A    Modalities:  N/A     Time-in Time-out Total Time   02572  Evaluation Low Complexity      21383  Evaluation Med Complexity      46532  Evaluation High

## 2025-02-19 ENCOUNTER — TREATMENT (OUTPATIENT)
Dept: PHYSICAL THERAPY | Age: 55
End: 2025-02-19
Payer: COMMERCIAL

## 2025-02-19 DIAGNOSIS — Z96.651 S/P TOTAL KNEE ARTHROPLASTY, RIGHT: ICD-10-CM

## 2025-02-19 DIAGNOSIS — Z96.651 S/P TOTAL KNEE ARTHROPLASTY, RIGHT: Primary | ICD-10-CM

## 2025-02-19 PROCEDURE — 97110 THERAPEUTIC EXERCISES: CPT

## 2025-02-19 RX ORDER — IBUPROFEN 600 MG/1
600 TABLET, FILM COATED ORAL 3 TIMES DAILY PRN
Qty: 30 TABLET | Refills: 0 | Status: SHIPPED | OUTPATIENT
Start: 2025-02-19

## 2025-02-20 NOTE — PROGRESS NOTES
[facility]                Phone: [phone] Fax: [phone]    Physical Therapy  Outpatient Discharge Summary     Date:  2025    Patient Name:  José Antonio Lara    :  1970  MRN: [id number]    DIAGNOSIS:     Diagnosis Orders   1. S/P total knee arthroplasty, right          REFERRING PHYSICIAN:  Klaus Abdul DO    ATTENDANCE:  Pt has attended 12 of 12 scheduled treatments from 25 to 25.  TREATMENTS RECEIVED:  ROM/stretching, strength training, functional retraining, education in a HEP    INITIAL STATUS:  Pain right knee 2-4/10  ROM right knee 22° to 93°  Strength right hip 3/5, right knee flex 4/5, knee ext 3/5  Gait: antalgic gait, limp R LE, ambulates with wheeled walker   Stairs: independent, non-reciprocal pattern with 2 rails   LEFS: 38/80    FINAL STATUS:  Pain 0/10  ROM right knee 3° to 115°  Strength R LE 5/5 throughout  Gait: reciprocating with slight antalgic pattern due to left knee pain  Steps are independent and reciprocating, slight hesitation with descending 6\" step  LEFS: 55/80  Pt is independent with HEP    GOALS:  7 out of 7 Long Term Goals were obtained.    LONG TERM GOALS NOT OBTAINED/REASON:  N/A    PATIENT GOALS:  walk without device    REASON FOR DISCHARGE:  Pt has met goals and is able to manage independently with HEP.    PATIENT EDUCATION/INSTRUCTIONS:  Pt educated in ROM, strength, and functional activities for HEP.    RECOMMENDATIONS:  Discharge to HEP.        Thank you for the opportunity to work with your patient. If you have questions or comments, please feel free to contact me by phone or fax.      Electronically Signed by: Sarah Shaver PT, [id number]  2025

## 2025-02-24 ENCOUNTER — TELEPHONE (OUTPATIENT)
Dept: ORTHOPEDIC SURGERY | Age: 55
End: 2025-02-24

## 2025-02-24 NOTE — TELEPHONE ENCOUNTER
Procedure(s):  ROBOTIC NATHAN ASSISTED RIGHT KNEE TOTAL JOINT ARTHROPLASTY   Date:  12/16/2024    Patient called stating his return to work date on disability form is 3/17/2025. Will need letter for employer stating RTW 3/17/2025, no restrictions.       Ok to draft letter and send

## 2025-02-25 NOTE — TELEPHONE ENCOUNTER
Informed patient letter can be found in MyChart.  He stated he does want restriction he had before surgery listed in letter due to severe arthritis in left knee that will hopefully be replaced in Fall       No going up and down stairs or ladders.  No deep squats.  No heavy activity with his legs.  No heavy lifting pushing or pulling until patient can undergo surgery for severe arthritis in his left knee.     Ok with change?

## 2025-02-26 ENCOUNTER — TELEPHONE (OUTPATIENT)
Dept: ORTHOPEDIC SURGERY | Age: 55
End: 2025-02-26

## 2025-02-26 NOTE — TELEPHONE ENCOUNTER
Letter signed.  Patient will try to get letter thru my chart.  If unable will call back with fax# or email to [email or web address]

## 2025-03-11 ENCOUNTER — PATIENT MESSAGE (OUTPATIENT)
Dept: FAMILY MEDICINE CLINIC | Age: 55
End: 2025-03-11

## 2025-03-11 DIAGNOSIS — G47.33 OSA (OBSTRUCTIVE SLEEP APNEA): Primary | ICD-10-CM

## 2025-03-12 ENCOUNTER — PATIENT MESSAGE (OUTPATIENT)
Dept: FAMILY MEDICINE CLINIC | Age: 55
End: 2025-03-12

## 2025-03-18 NOTE — TELEPHONE ENCOUNTER
Ralf w/ José Antonio, order was faxed to [facility] for a new sleep study.

## 2025-03-27 ENCOUNTER — OFFICE VISIT (OUTPATIENT)
Dept: ORTHOPEDIC SURGERY | Age: 55
End: 2025-03-27

## 2025-03-27 VITALS
SYSTOLIC BLOOD PRESSURE: 134 MMHG | HEART RATE: 58 BPM | OXYGEN SATURATION: 97 % | TEMPERATURE: 98.7 F | DIASTOLIC BLOOD PRESSURE: 85 MMHG

## 2025-03-27 DIAGNOSIS — M17.12 PRIMARY OSTEOARTHRITIS OF LEFT KNEE: ICD-10-CM

## 2025-03-27 DIAGNOSIS — M25.562 LEFT KNEE PAIN, UNSPECIFIED CHRONICITY: ICD-10-CM

## 2025-03-27 DIAGNOSIS — Z96.651 S/P TOTAL KNEE ARTHROPLASTY, RIGHT: Primary | ICD-10-CM

## 2025-03-27 RX ORDER — BUPIVACAINE HYDROCHLORIDE 2.5 MG/ML
3 INJECTION, SOLUTION INFILTRATION; PERINEURAL ONCE
Status: COMPLETED | OUTPATIENT
Start: 2025-03-27 | End: 2025-03-27

## 2025-03-27 RX ORDER — TRIAMCINOLONE ACETONIDE 40 MG/ML
80 INJECTION, SUSPENSION INTRA-ARTICULAR; INTRAMUSCULAR ONCE
Status: COMPLETED | OUTPATIENT
Start: 2025-03-27 | End: 2025-03-27

## 2025-03-27 RX ADMIN — BUPIVACAINE HYDROCHLORIDE 7.5 MG: 2.5 INJECTION, SOLUTION INFILTRATION; PERINEURAL at 10:39

## 2025-03-27 RX ADMIN — TRIAMCINOLONE ACETONIDE 80 MG: 40 INJECTION, SUSPENSION INTRA-ARTICULAR; INTRAMUSCULAR at 10:39

## 2025-03-27 NOTE — PROGRESS NOTES
Follow Up Post Operative Visit     Surgery: Robotic assisted right total knee arthroplasty  Date: 12/16/2024    Subjective:    José Antonio Lara is here for follow up visit s/p above procedure.  He states he is overall doing well. He is back in the gym and is happy with the results. Pain is well controlled. He has returned to work with no difficulty. His left knee is now bothersome, he denies any injury. He denies numbness or tingling. Denies fever or chills. He is ambulating without assistive device.    Controlled Substances Monitoring:        Physical Exam:    BP: 134/85    General: Alert and oriented x3, no acute distress  Cardiovascular/pulmonary: No labored breathing, peripheral perfusion intact  Musculoskeletal:    Right knee: Mature scar formation.  No abnormalities noted including erythema, warmth. Mild edema noted. Active range of motion 0-120 degrees with flexion and extension.  Calf soft and compressible.  Plantarflexion dorsiflexion intact. +2 DP/PT.    Left knee: Skin circumferentially intact. No effusion noted. Active range of motion 0-120 degrees with flexion and extension. Patella tracking midline. Crepitus noted with ROM. Negative Anterior/Posterior drawer. Negative Lachman. Stable varus/valgus stress testing at 0 and 30 degrees. Calf is soft and compressible.       Imaging: Multiple views of right knee independently interpreted by myself and discussed with patient shows stable total knee arthroplasty. No evidence of hardware failure or loosening. No acute fractures or dislocations noted.     Multiple views of the left knee independently interpreted by myself and discussed with patient shoes severe osteoarthritis with joint space narrowing, osteophyte formation and subchondral sclerosis. No acute fractures or dislocations noted.    Assessment and Plan: 3.5 months s/p right total knee arthroplasty; left knee osteoarthritis    - He is overall doing well and is happy with his results. He is able to

## 2025-03-29 ENCOUNTER — PATIENT MESSAGE (OUTPATIENT)
Dept: ORTHOPEDIC SURGERY | Age: 55
End: 2025-03-29

## 2025-04-01 ENCOUNTER — TELEPHONE (OUTPATIENT)
Dept: FAMILY MEDICINE CLINIC | Age: 55
End: 2025-04-01

## 2025-04-01 NOTE — TELEPHONE ENCOUNTER
Patient called in and stated that he is scheduled for a sleep test on 4/10/25 however his insurance denied having it done- the denial was scanned in on 3/27/25. Patient is frustrated due to him not being able to get supplies if he does not have this done. I was looking through office notes and did not come across ones that said he had sleep apnea to fax over to his insurance. Patient stated that he has had sleep apnea for 23 years. Patient is not sure what he should do

## 2025-04-02 ENCOUNTER — TELEPHONE (OUTPATIENT)
Dept: FAMILY MEDICINE CLINIC | Age: 55
End: 2025-04-02

## 2025-04-02 DIAGNOSIS — G47.33 OSA (OBSTRUCTIVE SLEEP APNEA): Primary | ICD-10-CM

## 2025-04-02 NOTE — TELEPHONE ENCOUNTER
On 4/2/25 [facility] called Johny. They need a new order for Home Sleep study.

## 2025-04-09 ENCOUNTER — TELEPHONE (OUTPATIENT)
Dept: FAMILY MEDICINE CLINIC | Age: 55
End: 2025-04-09

## 2025-04-09 NOTE — TELEPHONE ENCOUNTER
On 4/9/25 oJhny from the sleep lab called and said patient's insurance is denying his sleep study. He need a new order for HOME sleep study.

## 2025-04-10 ENCOUNTER — TELEPHONE (OUTPATIENT)
Dept: FAMILY MEDICINE CLINIC | Age: 55
End: 2025-04-10

## 2025-04-10 DIAGNOSIS — G47.33 OSA (OBSTRUCTIVE SLEEP APNEA): Primary | ICD-10-CM

## 2025-04-10 NOTE — TELEPHONE ENCOUNTER
Hi Dr Milagro Chou's insurance will not approve him for his IN house sleep study    Did you want to put in a new referral for a Home Sleep Study ?  If so you will need to put in a new referral    Thanks !

## 2025-04-25 ENCOUNTER — OFFICE VISIT (OUTPATIENT)
Dept: FAMILY MEDICINE CLINIC | Age: 55
End: 2025-04-25

## 2025-04-25 VITALS
TEMPERATURE: 98.6 F | OXYGEN SATURATION: 96 % | HEART RATE: 71 BPM | WEIGHT: 315 LBS | BODY MASS INDEX: 41.75 KG/M2 | SYSTOLIC BLOOD PRESSURE: 132 MMHG | DIASTOLIC BLOOD PRESSURE: 84 MMHG | RESPIRATION RATE: 20 BRPM | HEIGHT: 73 IN

## 2025-04-25 DIAGNOSIS — R30.0 DYSURIA: ICD-10-CM

## 2025-04-25 DIAGNOSIS — N39.0 URINARY TRACT INFECTION WITHOUT HEMATURIA, SITE UNSPECIFIED: ICD-10-CM

## 2025-04-25 DIAGNOSIS — R35.0 FREQUENCY OF MICTURITION: ICD-10-CM

## 2025-04-25 DIAGNOSIS — R30.0 DYSURIA: Primary | ICD-10-CM

## 2025-04-25 LAB
BILIRUBIN, POC: NEGATIVE
BLOOD URINE, POC: NORMAL
CLARITY, POC: NORMAL
COLOR, POC: YELLOW
GLUCOSE URINE, POC: NEGATIVE MG/DL
KETONES, POC: 15 MG/DL
LEUKOCYTE EST, POC: NORMAL
NITRITE, POC: POSITIVE
PH, POC: 6
PROTEIN, POC: 30 MG/DL
SPECIFIC GRAVITY, POC: 1.02
UROBILINOGEN, POC: 1 MG/DL

## 2025-04-25 RX ORDER — SULFAMETHOXAZOLE AND TRIMETHOPRIM 800; 160 MG/1; MG/1
1 TABLET ORAL 2 TIMES DAILY
Qty: 20 TABLET | Refills: 0 | Status: SHIPPED | OUTPATIENT
Start: 2025-04-25 | End: 2025-05-05

## 2025-04-25 ASSESSMENT — ENCOUNTER SYMPTOMS
ABDOMINAL PAIN: 0
VOMITING: 0
SHORTNESS OF BREATH: 0
DIARRHEA: 0
NAUSEA: 0

## 2025-04-26 NOTE — PROGRESS NOTES
Disp: , Rfl:   Allergies   Allergen Reactions    Cat Dander Itching       Past Medical History:   Diagnosis Date    Ankle pain     Arthritis     Chronic back pain     Chronic knee pain     Colon polyps     Diverticulitis     Dizzy spells     Essential hypertension 09/18/2019    Fatty liver     Foot pain     Gout     occaisional flareups    Hx of blood clots     DVT lower extremity about 5 years ago    Hyperlipidemia     not on any medications for this    Hypertension     Iron deficiency     Obesity     Routine chest x-ray     Unspecified sleep apnea     Vitamin B12 deficiency     Vitamin D deficiency 11/10/2010     Past Surgical History:   Procedure Laterality Date    COLONOSCOPY  07/20/2017    COLONOSCOPY N/A 8/5/2019    COLONOSCOPY POLYPECTOMY SNARE/COLD BIOPSY performed by Mahendra Ramirez MD at [facility] END[facility]COPY    COLONOSCOPY N/A 8/29/2022    COLONOSCOPY POLYPECTOMY SNARE/COLD BIOPSY performed by Mahendra Ramirez MD at [facility] ENDOSCOPY    KNEE ARTHROSCOPY  1985    Right knee    LEG SURGERY  1975    Right leg    ABIMAEL-EN-Y GASTRIC BYPASS  09/27/2010    Dr. Mcdonnell    TOTAL KNEE ARTHROPLASTY Right 12/16/2024    ROBOTIC NATHAN ASSISTED RIGHT KNEE TOTAL JOINT ARTHROPLASTY performed by Klaus Abdul DO at [facility] OR    UMBILICAL HERNIA REPAIR  1994/1995/2003    x3     Family History   Problem Relation Age of Onset    High Blood Pressure Father     High Cholesterol Father     Heart Disease Father     Heart Attack Father     Colon Cancer Father     Other Father         Renal failure    Heart Attack Brother     Heart Disease Brother     Cancer Mother         Ovarian    Breast Cancer Mother     Other Sister         Fibromyalgia    Cancer Maternal Uncle         Brain     Social History     Socioeconomic History    Marital status:      Spouse name: Not on file    Number of children: Not on file    Years of education: Not on file    Highest education level: Not on file   Occupational History    Not on file   Tobacco Use

## 2025-04-28 ENCOUNTER — RESULTS FOLLOW-UP (OUTPATIENT)
Dept: FAMILY MEDICINE CLINIC | Age: 55
End: 2025-04-28

## 2025-04-28 LAB
CULTURE: ABNORMAL
SPECIMEN DESCRIPTION: ABNORMAL

## 2025-04-28 NOTE — TELEPHONE ENCOUNTER
Please notify patient that urine culture grew out E. coli sensitive to the Bactrim that I placed him on.  He should finish this up and follow-up with urology and PCP.

## 2025-05-01 ENCOUNTER — PATIENT MESSAGE (OUTPATIENT)
Dept: ORTHOPEDIC SURGERY | Age: 55
End: 2025-05-01

## 2025-05-08 ENCOUNTER — HOSPITAL ENCOUNTER (OUTPATIENT)
Dept: SLEEP CENTER | Age: 55
Discharge: HOME | End: 2025-05-08
Attending: FAMILY MEDICINE
Payer: COMMERCIAL

## 2025-05-08 DIAGNOSIS — G47.33 OBSTRUCTIVE SLEEP APNEA (ADULT) (PEDIATRIC): ICD-10-CM

## 2025-05-08 PROCEDURE — 95800 SLP STDY UNATTENDED: CPT

## 2025-06-19 ENCOUNTER — OFFICE VISIT (OUTPATIENT)
Dept: ORTHOPEDIC SURGERY | Age: 55
End: 2025-06-19

## 2025-06-19 VITALS
OXYGEN SATURATION: 96 % | HEART RATE: 65 BPM | RESPIRATION RATE: 16 BRPM | TEMPERATURE: 98.2 F | DIASTOLIC BLOOD PRESSURE: 81 MMHG | SYSTOLIC BLOOD PRESSURE: 129 MMHG

## 2025-06-19 DIAGNOSIS — M17.12 PRIMARY OSTEOARTHRITIS OF LEFT KNEE: Primary | ICD-10-CM

## 2025-06-19 RX ORDER — TRIAMCINOLONE ACETONIDE 40 MG/ML
80 INJECTION, SUSPENSION INTRA-ARTICULAR; INTRAMUSCULAR ONCE
Status: COMPLETED | OUTPATIENT
Start: 2025-06-19 | End: 2025-06-19

## 2025-06-19 RX ORDER — MELOXICAM 7.5 MG/1
7.5 TABLET ORAL DAILY
Qty: 30 TABLET | Refills: 2 | Status: SHIPPED | OUTPATIENT
Start: 2025-06-19

## 2025-06-19 RX ORDER — BUPIVACAINE HYDROCHLORIDE 2.5 MG/ML
3 INJECTION, SOLUTION INFILTRATION; PERINEURAL ONCE
Status: COMPLETED | OUTPATIENT
Start: 2025-06-19 | End: 2025-06-19

## 2025-06-19 RX ADMIN — BUPIVACAINE HYDROCHLORIDE 7.5 MG: 2.5 INJECTION, SOLUTION INFILTRATION; PERINEURAL at 10:07

## 2025-06-19 RX ADMIN — TRIAMCINOLONE ACETONIDE 80 MG: 40 INJECTION, SUSPENSION INTRA-ARTICULAR; INTRAMUSCULAR at 10:07

## 2025-06-19 NOTE — PATIENT INSTRUCTIONS
Procedure: Left Total Knee NATHAN    You will need medical clearance prior to surgery.     Please call your doctor to set up an appointment for medical clearance if necessary as soon as possible and have the office fax your medical clearance to : Rajni Browning MA  FAX: [phone], PHONE: [phone]    You need medical clearance from: Primary Care Provider    Your surgery is scheduled for 10/6/2025 at 9:45am  with Dr. Klaus Abdul DO at the [facility] on [address].  You will need to report to Preop area  that morning at 7;45am .   All surgery start times are subject to change. You will be notified by office and/or PAT department if your surgery time changes. If you need to cancel/reschedule your surgery for any reason, please contact [facility] at [phone] ASA.   You are having Outpatient surgery, but plan for 1-2 nights in the hospital for recovery if needed.  Preadmission Testing (PAT) department at [facility] will contact you with further details regarding pre-operative blood work, where to park and enter the hospital, your medication list, etc. If you have any surgery related questions please contact PAT at [facility] at [phone].  If you are having joint replacement surgery, you will be required to attend a mandatory joint class, normally scheduled the same days as your pre admission testing.  If you are scheduled for Robotic Assisted Total Joint Replacement, you will be ordered a specialized CT scan prior to surgery. Interventional Radiology will contact you to schedule CT scan.  Nothing to eat or drink after midnight the night before surgery or otherwise directed by PAT.  You may take a pain pill and any other medicine PAT instructs you to take with small sip of water if needed.  If you are taking blood thinners such as Aspirin, Lovenox, Eliquis, Xarelto, Plavix or Coumadin(Warfarin) they will be discontinued prior to surgery per prescriber.   Hold all

## 2025-06-19 NOTE — PROGRESS NOTES
New Knee Patient     Referring Provider:   No referring provider defined for this encounter.    CHIEF COMPLAINT:   Chief Complaint   Patient presents with    Follow-up     Left knee pain, he wants to discuss something stronger than the cortisone injections, they only last about a month        HPI:    José Antonio Lara is a 55 y.o. year old male is here today for end-stage left knee osteoarthritis.  He had a history of right total knee arthroplasty performed by myself and did very well.  He has been receiving extensive conservative treatment for his left knee including activity modification, oral anti-inflammatories, physical therapy, and intra-articular injections.  He has pain that interferes with activities of daily living and is here today to discuss surgical management.    PAST MEDICAL HISTORY  Past Medical History:   Diagnosis Date    Ankle pain     Arthritis     Chronic back pain     Chronic knee pain     Colon polyps     Diverticulitis     Dizzy spells     Essential hypertension 09/18/2019    Fatty liver     Foot pain     Gout     occaisional flareups    Hx of blood clots     DVT lower extremity about 5 years ago    Hyperlipidemia     not on any medications for this    Hypertension     Iron deficiency     Obesity     Routine chest x-ray     Unspecified sleep apnea     Vitamin B12 deficiency     Vitamin D deficiency 11/10/2010       PAST SURGICAL HISTORY  Past Surgical History:   Procedure Laterality Date    COLONOSCOPY  07/20/2017    COLONOSCOPY N/A 8/5/2019    COLONOSCOPY POLYPECTOMY SNARE/COLD BIOPSY performed by Mahendra Ramirez MD at [facility]    COLONOSCOPY N/A 8/29/2022    COLONOSCOPY POLYPECTOMY SNARE/COLD BIOPSY performed by Mahendra Ramirez MD at [facility]    KNEE ARTHROSCOPY  1985    Right knee    LEG SURGERY  1975    Right leg    ABIMAEL-EN-Y GASTRIC BYPASS  09/27/2010    Dr. Mcdonnell    TOTAL KNEE ARTHROPLASTY Right 12/16/2024    ROBOTIC NATHAN ASSISTED RIGHT KNEE TOTAL JOINT ARTHROPLASTY 
Patient with one or more new problems requiring additional work-up/treatment.

## 2025-06-22 SDOH — ECONOMIC STABILITY: FOOD INSECURITY: WITHIN THE PAST 12 MONTHS, THE FOOD YOU BOUGHT JUST DIDN'T LAST AND YOU DIDN'T HAVE MONEY TO GET MORE.: NEVER TRUE

## 2025-06-22 SDOH — ECONOMIC STABILITY: INCOME INSECURITY: IN THE LAST 12 MONTHS, WAS THERE A TIME WHEN YOU WERE NOT ABLE TO PAY THE MORTGAGE OR RENT ON TIME?: NO

## 2025-06-22 SDOH — ECONOMIC STABILITY: TRANSPORTATION INSECURITY
IN THE PAST 12 MONTHS, HAS THE LACK OF TRANSPORTATION KEPT YOU FROM MEDICAL APPOINTMENTS OR FROM GETTING MEDICATIONS?: NO

## 2025-06-22 SDOH — ECONOMIC STABILITY: FOOD INSECURITY: WITHIN THE PAST 12 MONTHS, YOU WORRIED THAT YOUR FOOD WOULD RUN OUT BEFORE YOU GOT MONEY TO BUY MORE.: NEVER TRUE

## 2025-06-22 SDOH — ECONOMIC STABILITY: TRANSPORTATION INSECURITY
IN THE PAST 12 MONTHS, HAS LACK OF TRANSPORTATION KEPT YOU FROM MEETINGS, WORK, OR FROM GETTING THINGS NEEDED FOR DAILY LIVING?: NO

## 2025-06-22 ASSESSMENT — PATIENT HEALTH QUESTIONNAIRE - PHQ9
SUM OF ALL RESPONSES TO PHQ9 QUESTIONS 1 & 2: 0
SUM OF ALL RESPONSES TO PHQ QUESTIONS 1-9: 0
SUM OF ALL RESPONSES TO PHQ QUESTIONS 1-9: 0
2. FEELING DOWN, DEPRESSED OR HOPELESS: NOT AT ALL
1. LITTLE INTEREST OR PLEASURE IN DOING THINGS: NOT AT ALL
1. LITTLE INTEREST OR PLEASURE IN DOING THINGS: NOT AT ALL
SUM OF ALL RESPONSES TO PHQ QUESTIONS 1-9: 0
2. FEELING DOWN, DEPRESSED OR HOPELESS: NOT AT ALL
SUM OF ALL RESPONSES TO PHQ QUESTIONS 1-9: 0

## 2025-06-23 ENCOUNTER — PREP FOR PROCEDURE (OUTPATIENT)
Dept: ORTHOPEDIC SURGERY | Age: 55
End: 2025-06-23

## 2025-06-23 ENCOUNTER — TELEPHONE (OUTPATIENT)
Dept: ORTHOPEDIC SURGERY | Age: 55
End: 2025-06-23

## 2025-06-23 DIAGNOSIS — M17.12 PRIMARY OSTEOARTHRITIS OF LEFT KNEE: ICD-10-CM

## 2025-06-23 DIAGNOSIS — M17.12 PRIMARY OSTEOARTHRITIS OF LEFT KNEE: Primary | ICD-10-CM

## 2025-06-23 DIAGNOSIS — M25.562 LEFT KNEE PAIN, UNSPECIFIED CHRONICITY: ICD-10-CM

## 2025-06-23 NOTE — TELEPHONE ENCOUNTER
Patient was informed that SCOTT ODONNELL will be calling him to schedule a CT Scan Lt Knee prior to surgery date 10/6/2025 for Lt TKA (NATHAN).  Order for CT Scan fax'd to [facility] for scheduling

## 2025-06-25 ENCOUNTER — OFFICE VISIT (OUTPATIENT)
Dept: FAMILY MEDICINE CLINIC | Age: 55
End: 2025-06-25

## 2025-06-25 VITALS
BODY MASS INDEX: 42.66 KG/M2 | RESPIRATION RATE: 16 BRPM | TEMPERATURE: 98.1 F | OXYGEN SATURATION: 96 % | SYSTOLIC BLOOD PRESSURE: 116 MMHG | WEIGHT: 315 LBS | HEART RATE: 65 BPM | DIASTOLIC BLOOD PRESSURE: 68 MMHG | HEIGHT: 72 IN

## 2025-06-25 DIAGNOSIS — I10 ESSENTIAL HYPERTENSION: ICD-10-CM

## 2025-06-25 DIAGNOSIS — E78.5 HYPERLIPIDEMIA, UNSPECIFIED HYPERLIPIDEMIA TYPE: Primary | ICD-10-CM

## 2025-06-25 NOTE — PROGRESS NOTES
José Antonio Lara is a 55 y.o. male   CC:   Chief Complaint   Patient presents with    Follow-up     6 month follow up       History of Present Illness  The patient presents for evaluation of hypertension, urinary tract infection, and weight management.    Hypertension  His blood pressure readings have been consistently low, a level not seen since his teenage years. He is currently on lisinopril and does not intend to discontinue its use. He recounts a period post-surgery when he was off lisinopril for a week due to forgetfulness, during which his blood pressure readings were elevated at 130s systolic.  - Onset: Post-surgery period when off lisinopril for a week.  - Duration: Consistently low blood pressure readings since teenage years.  - Character: Elevated blood pressure readings at 130s systolic when off lisinopril.  - Alleviating/Aggravating Factors: Use of lisinopril.  - Severity: Elevated readings when off medication.    Urinary Tract Infection  He was previously treated for a urinary tract infection by a former physician, who recommended a urology consultation. However, he has not pursued this due to other commitments.  - Onset: Previous treatment by a former physician.  - Alleviating/Aggravating Factors: Recommended urology consultation.    Weight Management  He is making efforts to lose weight, with a goal of reducing his weight to under 300 pounds. His lowest recorded weight was 316 pounds. He supplements his cardio workouts with upper body weight training and believes he is gaining muscle mass. He has not made any significant changes to his diet.  - Onset: Efforts to lose weight.  - Character: Cardio workouts supplemented with upper body weight training.  - Severity: Lowest recorded weight was 316 pounds.    Knee Issues  He is scheduled for a left knee surgery on 10/06/2025 and has arranged a follow-up appointment in [place] on 09/11/2025. He maintains an active lifestyle, attending the gym 3 to 4

## 2025-06-30 DIAGNOSIS — G47.33 OSA (OBSTRUCTIVE SLEEP APNEA): Primary | ICD-10-CM

## 2025-08-19 ENCOUNTER — TELEPHONE (OUTPATIENT)
Dept: ORTHOPEDIC SURGERY | Age: 55
End: 2025-08-19

## 2025-08-19 DIAGNOSIS — Z96.651 S/P TOTAL KNEE ARTHROPLASTY, RIGHT: Primary | ICD-10-CM

## 2025-08-19 DIAGNOSIS — Z79.2 PROPHYLACTIC ANTIBIOTIC: ICD-10-CM

## 2025-08-19 RX ORDER — AMOXICILLIN 500 MG/1
TABLET, FILM COATED ORAL
Qty: 3 TABLET | Refills: 3 | Status: SHIPPED | OUTPATIENT
Start: 2025-08-19

## 2025-08-19 RX ORDER — AMOXICILLIN 500 MG/1
TABLET, FILM COATED ORAL
Qty: 3 TABLET | Refills: 3 | Status: SHIPPED | OUTPATIENT
Start: 2025-08-19 | End: 2025-08-19 | Stop reason: SDUPTHER

## (undated) DEVICE — KIT TRK KNEE PROC VIZADISC

## (undated) DEVICE — Device

## (undated) DEVICE — STRYKER PERFORMANCE SERIES SAGITTAL BLADE: Brand: STRYKER PERFORMANCE SERIES

## (undated) DEVICE — LIQUIBAND RAPID ADHESIVE 36/CS 0.8ML: Brand: MEDLINE

## (undated) DEVICE — SPONGE GZ W4XL4IN RAYON POLY FILL CVR W/ NONWOVEN FAB

## (undated) DEVICE — KIT SURG W7XL11IN 2 PKT UNTREATED NA

## (undated) DEVICE — NEEDLE HYPO 18GA L1.5IN PNK POLYPR HUB S STL REG BVL STR

## (undated) DEVICE — KIT INT FIX FEM TIB CKPT MAKOPLASTY

## (undated) DEVICE — MASTISOL ADHESIVE LIQ 2/3ML

## (undated) DEVICE — GAUZE,SPONGE,POST-OP,4X3,STRL,LF: Brand: MEDLINE

## (undated) DEVICE — CONTAINER SPEC 60ML PH 7NEUTRAL BUFF FRMLN RDY TO USE

## (undated) DEVICE — GLOVE ORANGE PI 8 1/2   MSG9085

## (undated) DEVICE — SNARE ENDOSCP L240CM SHTH DIA24MM LOOP W10MM POLYP RND REINF

## (undated) DEVICE — SOLUTION WND IRRIGATION 450 ML 0.5 PVP-I 0.9 NACL

## (undated) DEVICE — BNDG,ELSTC,MATRIX,STRL,6"X5YD,LF,HOOK&LP: Brand: MEDLINE

## (undated) DEVICE — 3M™ STERI-DRAPE™ U-DRAPE 1015: Brand: STERI-DRAPE™

## (undated) DEVICE — GLOVE SURG SZ 9 L12IN FNGR THK79MIL GRN LTX FREE

## (undated) DEVICE — DEFENDO AIR WATER SUCTION AND BIOPSY VALVE KIT FOR  OLYMPUS: Brand: DEFENDO AIR/WATER/SUCTION AND BIOPSY VALVE

## (undated) DEVICE — WIPES SKIN CLOTH READYPREP 9 X 10.5 IN 2% CHLORHEX GLUCONATE CHG PREOP

## (undated) DEVICE — CONNECTOR IRRIGATION AUXILIARY H2O JET W/ PRT MTL THRD HYDR

## (undated) DEVICE — SOLUTION IRRIG 3000ML 0.9% SOD CHL USP UROMATIC PLAS CONT

## (undated) DEVICE — APPLICATOR MEDICATED 26 CC SOLUTION HI LT ORNG CHLORAPREP

## (undated) DEVICE — 4-PORT MANIFOLD: Brand: NEPTUNE 2

## (undated) DEVICE — TOWEL,OR,DSP,ST,BLUE,STD,6/PK,12PK/CS: Brand: MEDLINE

## (undated) DEVICE — GLOVE SURG SZ 85 L12IN FNGR ORTHO 126MIL CRM LTX FREE

## (undated) DEVICE — PIN BNE FIX TEMP L110MM DIA4MM MAKO

## (undated) DEVICE — HANDPIECE SET WITH COAXIAL HIGH FLOW TIP AND SUCTION TUBE: Brand: INTERPULSE

## (undated) DEVICE — CONNECTOR TBNG AUX H2O JET DISP FOR OLY 160/180 SER

## (undated) DEVICE — BLADE SURG SAW S STL NAR OSC W/ SERR EDGE DISP

## (undated) DEVICE — PIN BNE FIX TEMP L140MM DIA4MM MAKO

## (undated) DEVICE — DRESSING HYDROFIBER AQUACEL AG ADVANTAGE 3.5X6 IN

## (undated) DEVICE — STRIP,CLOSURE,WOUND,MEDI-STRIP,1/2X4: Brand: MEDLINE

## (undated) DEVICE — CORD RETRCT SIL - ORDER MULTIPLES OF 10 EACH

## (undated) DEVICE — Z DISCONTINUED NO SUB IDED TUBING ETCO2 AD L6.5FT NSL ORAL CVD PRNG NONFLARED TIP OVR

## (undated) DEVICE — BOOT POS LEG DEMAYO

## (undated) DEVICE — SYRINGE MED 30ML STD CLR PLAS LUERLOCK TIP N CTRL DISP

## (undated) DEVICE — TUBING, SUCTION, 9/32" X 10', STRAIGHT: Brand: MEDLINE

## (undated) DEVICE — DRAPE,REIN 53X77,STERILE: Brand: MEDLINE

## (undated) DEVICE — KIT DRP FOR RIO ROBOTIC ARM ASST SYS (ORDER MUTLIPLES OF 10 EACH)

## (undated) DEVICE — TAPE ADH W3INXL10YD WHT COT WVN BK POWERFUL RUB BASE HIGHLY

## (undated) DEVICE — GOWN,SIRUS,POLYRNF,XLN/3XL,18/CS: Brand: MEDLINE

## (undated) DEVICE — SPONGE LAP W18XL18IN WHT COT 4 PLY FLD STRUNG RADPQ DISP ST 2 PER PACK

## (undated) DEVICE — CANNULA NSL ORAL AD FOR CAPNOFLEX CO2 O2 AIRLFE

## (undated) DEVICE — DRESSING HYDROFIBER AQUACEL AG ADVANTAGE 3.5X12 IN

## (undated) DEVICE — GLOVE SURG SZ 9 L12IN FNGR THK126MIL CRM LTX FREE

## (undated) DEVICE — SNARE VASC L240CM LOOP W10MM SHTH DIA2.4MM RND STIFF CLD

## (undated) DEVICE — 3M™ IOBAN™ 2 ANTIMICROBIAL INCISE DRAPE 6651EZ: Brand: IOBAN™ 2

## (undated) DEVICE — DOUBLE BASIN SET: Brand: MEDLINE INDUSTRIES, INC.

## (undated) DEVICE — GOWN,SIRUS,POLYRNF,BRTHSLV,XL,30/CS: Brand: MEDLINE

## (undated) DEVICE — BANDAGE COMPR W6INXL12FT SMOOTH FOR LIMB EXSANG ESMARCH

## (undated) DEVICE — ELECTRODE PT RET AD L9FT HI MOIST COND ADH HYDRGEL CORDED

## (undated) DEVICE — PEEL-AWAY HOOD: Brand: FLYTE, SURGICOOL